# Patient Record
Sex: MALE | Race: BLACK OR AFRICAN AMERICAN | Employment: OTHER | URBAN - METROPOLITAN AREA
[De-identification: names, ages, dates, MRNs, and addresses within clinical notes are randomized per-mention and may not be internally consistent; named-entity substitution may affect disease eponyms.]

---

## 2020-06-08 ENCOUNTER — OFFICE VISIT (OUTPATIENT)
Dept: INTERNAL MEDICINE CLINIC | Age: 75
End: 2020-06-08

## 2020-06-08 VITALS
HEART RATE: 63 BPM | TEMPERATURE: 98.1 F | DIASTOLIC BLOOD PRESSURE: 74 MMHG | OXYGEN SATURATION: 99 % | SYSTOLIC BLOOD PRESSURE: 171 MMHG | WEIGHT: 204.8 LBS | BODY MASS INDEX: 32.15 KG/M2 | HEIGHT: 67 IN | RESPIRATION RATE: 14 BRPM

## 2020-06-08 DIAGNOSIS — G56.03 BILATERAL CARPAL TUNNEL SYNDROME: ICD-10-CM

## 2020-06-08 DIAGNOSIS — I10 ESSENTIAL HYPERTENSION: ICD-10-CM

## 2020-06-08 DIAGNOSIS — I73.9 PVD (PERIPHERAL VASCULAR DISEASE) (HCC): ICD-10-CM

## 2020-06-08 DIAGNOSIS — M79.89 LEG SWELLING: ICD-10-CM

## 2020-06-08 DIAGNOSIS — N18.30 TYPE 2 DIABETES MELLITUS WITH STAGE 3 CHRONIC KIDNEY DISEASE, WITH LONG-TERM CURRENT USE OF INSULIN (HCC): Primary | ICD-10-CM

## 2020-06-08 DIAGNOSIS — E11.22 TYPE 2 DIABETES MELLITUS WITH STAGE 3 CHRONIC KIDNEY DISEASE, WITH LONG-TERM CURRENT USE OF INSULIN (HCC): Primary | ICD-10-CM

## 2020-06-08 DIAGNOSIS — Z79.4 TYPE 2 DIABETES MELLITUS WITH STAGE 3 CHRONIC KIDNEY DISEASE, WITH LONG-TERM CURRENT USE OF INSULIN (HCC): Primary | ICD-10-CM

## 2020-06-08 DIAGNOSIS — E78.00 HIGH CHOLESTEROL: ICD-10-CM

## 2020-06-08 RX ORDER — ALBUTEROL SULFATE 90 UG/1
AEROSOL, METERED RESPIRATORY (INHALATION)
COMMUNITY
End: 2020-07-20 | Stop reason: SDUPTHER

## 2020-06-08 RX ORDER — LANSOPRAZOLE 30 MG/1
30 CAPSULE, DELAYED RELEASE ORAL
Qty: 90 CAP | Refills: 3 | Status: SHIPPED | OUTPATIENT
Start: 2020-06-08 | End: 2021-02-17 | Stop reason: SDUPTHER

## 2020-06-08 RX ORDER — HYDROCHLOROTHIAZIDE 25 MG/1
25 TABLET ORAL DAILY
Qty: 90 TAB | Refills: 3 | Status: SHIPPED | OUTPATIENT
Start: 2020-06-08 | End: 2020-06-08 | Stop reason: CLARIF

## 2020-06-08 RX ORDER — AMLODIPINE BESYLATE 10 MG/1
10 TABLET ORAL DAILY
Qty: 90 TAB | Refills: 1 | COMMUNITY
Start: 2020-06-08 | End: 2020-10-13 | Stop reason: SINTOL

## 2020-06-08 RX ORDER — ATORVASTATIN CALCIUM 20 MG/1
20 TABLET, FILM COATED ORAL DAILY
Qty: 90 TAB | Refills: 3 | Status: SHIPPED | OUTPATIENT
Start: 2020-06-08 | End: 2020-10-13 | Stop reason: DRUGHIGH

## 2020-06-08 RX ORDER — FUROSEMIDE 20 MG/1
20 TABLET ORAL DAILY
Qty: 90 TAB | Refills: 3 | Status: SHIPPED | OUTPATIENT
Start: 2020-06-08 | End: 2020-07-08

## 2020-06-08 RX ORDER — CARVEDILOL 25 MG/1
25 TABLET ORAL 2 TIMES DAILY
Qty: 180 TAB | Refills: 3 | Status: SHIPPED | OUTPATIENT
Start: 2020-06-08 | End: 2021-04-22 | Stop reason: SDUPTHER

## 2020-06-08 RX ORDER — LISINOPRIL 40 MG/1
40 TABLET ORAL DAILY
COMMUNITY
End: 2020-11-04 | Stop reason: SDUPTHER

## 2020-06-08 RX ORDER — INSULIN LISPRO 100 [IU]/ML
INJECTION, SOLUTION INTRAVENOUS; SUBCUTANEOUS
COMMUNITY
Start: 2020-06-08 | End: 2022-01-27

## 2020-06-08 NOTE — PROGRESS NOTES
Seda Zavaleta is a 76 y.o.  male and presents with Hypertension; Diabetes; Leg Swelling; Numbness; and Cholesterol Problem      SUBJECTIVE:    Dyslipidemia  Patient presents for evaluation of lipids. Compliance with treatment thus far has been excellent. A repeat fasting lipid profile was not done. The patient does not use medications that may worsen dyslipidemias (corticosteroids, progestins, anabolic steroids, diuretics, beta-blockers, amiodarone, cyclosporine, olanzapine). The patient exercises intermittently. The patient is not known to have coexisting coronary artery disease. He does have h/o CVA in the past and left occluded carotid for which he tells me he had a procedure done at [facility] 4/2015. Hypertension  Patient is here for evaluation of hypertension. Age at onset of elevated blood pressure:  60.  He indicates that he is feeling well and denies any symptoms referable to his elevated blood pressure. Specifically denies chest pain, palpitations, dyspnea, orthopnea, PND. He does  have peripheral edema. Current medication regimen is as listed   below. Patient has these side effects of medication: none. Cardiovascular risk factors: dyslipidemia, diabetes mellitus, obesity Use of agents associated with hypertension: none History of renal disease: positive. Pt has CKD stage 3 at least since 2016. He was seen Renal in [place] where he just returned from. History of flank trauma: no. Pt's HTN has not been controlled on COreg 25 mg BID and Norvasc 10 mg and Lisinopril 40 mg     Diabetes Mellitus  Patient presents for evaluation of for follow up on diabetes. Onset of symptoms was problem is longstanding, this episode began 20 years ago. He describes symptoms as fatigue. Course to date has been stable. Patient denies polyuria and polydipsia. Home sugars are running: patient forgot to bring HBG readings Previous visits for this problem: none. Evaluation to date has been none.  Treatment goals: not assessed Treatment to date has been Has been on current regimen for several months. . Pt currently seen by Dr Shakira Early and is on Lantus 50 units QHS and Humalog 15 units BID with meals. Pt c/o numbness in both hands worst in AM which has been going on for several months. Pt has anemia due to chronic kidney disease. Respiratory ROS: negative for - shortness of breath  Cardiovascular ROS: negative for - chest pain    Current Outpatient Medications   Medication Sig    lisinopriL (PRINIVIL, ZESTRIL) 40 mg tablet Take 40 mg by mouth daily.  albuterol (ProAir HFA) 90 mcg/actuation inhaler Take  by inhalation.  lansoprazole (Prevacid) 30 mg capsule Take 1 Cap by mouth Daily (before breakfast).  amLODIPine (NORVASC) 10 mg tablet Take 1 Tab by mouth daily.  carvediloL (Coreg) 25 mg tablet Take 1 Tab by mouth two (2) times a day.  atorvastatin (Lipitor) 20 mg tablet Take 1 Tab by mouth daily.  furosemide (LASIX) 20 mg tablet Take 1 Tab by mouth daily. Indications: high blood pressure    insulin lispro (HUMALOG) 100 unit/mL kwikpen 15 units BID with meals    glucose blood VI test strips (ONETOUCH ULTRA TEST) strip 50 Each.  insulin glargine (LANTUS SOLOSTAR) 100 unit/mL (3 mL) pen Lantus 24 Units SC daily    glucose blood VI test strips (ASCENSIA CONTOUR) strip     Insulin Needles, Disposable, (AUBREY PEN NEEDLE) 32 x 5/32 \" ndle 1 each.  Lancets misc 1 each.  multivitamins-minerals-lutein (CENTRUM SILVER) Tab Take  by mouth.  aspirin delayed-release 325 mg tablet 325 mg.    sildenafil citrate (VIAGRA) 100 mg tablet Take 1 Tab by mouth as needed. No current facility-administered medications for this visit.           OBJECTIVE:  alert, well appearing, and in no distress  Visit Vitals  /74   Pulse 63   Temp 98.1 °F (36.7 °C) (Temporal)   Resp 14   Ht 5' 7\" (1.702 m)   Wt 204 lb 12.8 oz (92.9 kg)   SpO2 99%   BMI 32.08 kg/m²      well developed and well nourished  Mental status - normal mood, behavior, speech, dress, motor activity, and thought processes  Eyes - pupils equal and reactive, extraocular eye movements intact  Neck - supple, no significant adenopathy  Chest - clear to auscultation, no wheezes, rales or rhonchi, symmetric air entry  Heart - normal rate and regular rhythm, systolic murmur 2/6 at 2nd left intercostal space  Abdomen - soft, nontender, nondistended, no masses or organomegaly  Neurological - alert, oriented, normal speech, positive Phalen and positive Tinel sign bilaterally at both wrists  Musculoskeletal - no joint tenderness, deformity or swelling  Extremities - pedal edema 2 +  Skin - normal coloration and turgor, no rashes, no suspicious skin lesions noted        Labs: will get copy From Dr Mariana Aleman office     Discussed the patient's BMI with him. The BMI follow up plan is as follows: I have counseled this patient on diet and exercise regimens. Assessment/Plan      ICD-10-CM ICD-9-CM    1. Type 2 diabetes mellitus with stage 3 chronic kidney disease, with long-term current use of insulin ([facility]) E11.22 250.40 REFERRAL TO OPHTHALMOLOGY. Status unknown. Pt followed by Rony and is on Lantus 50 units QHS and Humalog 15-20 units BID with meals      N18.3 585.3     Z79.4 V58.67    2. Essential hypertension I10 401.9 Uncontrolled. Will add furosemide (LASIX) 20 mg tablet daily    3. High cholesterol E78.00 272.0 Status unknown on Lipitor 20 mg    4. Bilateral carpal tunnel syndrome G56.03 354.0 Pt to use wrist splints at night    5. Leg swelling M79.89 729.81 Will try furosemide (LASIX) 20 mg tablet. If not improving will decrease Norvasc    6. PVD (peripheral vascular disease) ([facility]) I73.9 443.9 Pt is on  mg and statin. Consider repeat PVL of carotids. Follow-up and Dispositions    · Return in about 4 weeks (around 7/6/2020) for BP check. Reviewed plan of care. Patient has provided input and agrees with goals.

## 2020-06-18 ENCOUNTER — TELEPHONE (OUTPATIENT)
Dept: INTERNAL MEDICINE CLINIC | Age: 75
End: 2020-06-18

## 2020-06-18 NOTE — TELEPHONE ENCOUNTER
Pt c/o heart beating very fast after taking the Furosemide that was prescribed---said he has stopped it and won't take anymore of it. Please advise him what he should do at [phone].

## 2020-06-19 NOTE — TELEPHONE ENCOUNTER
Pt had palpitations with Lasix and SBP went to 80 so will hold off on adding more BP meds and continue on lisinopril , coreg and Norvasc /65 today. Pt sudhir continue to monitor daily and bring in record at next OV. He never used HCTZ. Jude

## 2020-07-08 ENCOUNTER — VIRTUAL VISIT (OUTPATIENT)
Dept: INTERNAL MEDICINE CLINIC | Age: 75
End: 2020-07-08

## 2020-07-08 DIAGNOSIS — Z12.5 PROSTATE CANCER SCREENING: ICD-10-CM

## 2020-07-08 DIAGNOSIS — E11.22 TYPE 2 DIABETES MELLITUS WITH STAGE 3 CHRONIC KIDNEY DISEASE, WITH LONG-TERM CURRENT USE OF INSULIN (HCC): ICD-10-CM

## 2020-07-08 DIAGNOSIS — E78.00 HIGH CHOLESTEROL: ICD-10-CM

## 2020-07-08 DIAGNOSIS — I10 ESSENTIAL HYPERTENSION: Primary | ICD-10-CM

## 2020-07-08 DIAGNOSIS — N18.30 STAGE 3 CHRONIC KIDNEY DISEASE (HCC): ICD-10-CM

## 2020-07-08 DIAGNOSIS — N18.30 TYPE 2 DIABETES MELLITUS WITH STAGE 3 CHRONIC KIDNEY DISEASE, WITH LONG-TERM CURRENT USE OF INSULIN (HCC): ICD-10-CM

## 2020-07-08 DIAGNOSIS — Z79.4 TYPE 2 DIABETES MELLITUS WITH STAGE 3 CHRONIC KIDNEY DISEASE, WITH LONG-TERM CURRENT USE OF INSULIN (HCC): ICD-10-CM

## 2020-07-08 DIAGNOSIS — Z11.59 NEED FOR HEPATITIS C SCREENING TEST: ICD-10-CM

## 2020-07-08 NOTE — PROGRESS NOTES
Santo Trammell 76 y.o. male who was seen by synchronous (real-time) audio-video technology on 07/08/20    Consent:  heand/or his healthcare decision maker is aware that this patient-initiated Telehealth encounter is a billable service, with coverage as determined by her insurance carrier. he is aware that he may receive a bill and has provided verbal consent to proceed: Yes I was in my office while conducting this encounter. The patient was in their home. Santo Trammell is a 76 y.o.  male and presents with Hypertension; Chronic Kidney Disease; Diabetes; and Cholesterol Problem      SUBJECTIVE:    Patient's blood pressure is better controlled on lisinopril 40 mg daily along with Norvasc 10 mg daily and Coreg 25 mg twice daily. Patient did not tolerate taking Lasix and because he has chronic kidney disease stage III did not give him HCTZ. He will continue to work on diet exercise and weight loss to keep his blood pressure under control. He continues on Lipitor 20 mg daily for his high cholesterol. His diabetes has been controlled on Lantus 24 units daily with Humalog 15 units twice daily    Respiratory ROS: negative for - shortness of breath  Cardiovascular ROS: negative for - chest pain    Current Outpatient Medications   Medication Sig    lisinopriL (PRINIVIL, ZESTRIL) 40 mg tablet Take 40 mg by mouth daily.  albuterol (ProAir HFA) 90 mcg/actuation inhaler Take  by inhalation.  lansoprazole (Prevacid) 30 mg capsule Take 1 Cap by mouth Daily (before breakfast).  amLODIPine (NORVASC) 10 mg tablet Take 1 Tab by mouth daily.  carvediloL (Coreg) 25 mg tablet Take 1 Tab by mouth two (2) times a day.  atorvastatin (Lipitor) 20 mg tablet Take 1 Tab by mouth daily.  insulin lispro (HUMALOG) 100 unit/mL kwikpen 15 units BID with meals    aspirin delayed-release 325 mg tablet 325 mg.    glucose blood VI test strips (ONETOUCH ULTRA TEST) strip 50 Each.     insulin glargine (LANTUS SOLOSTAR) 100 unit/mL (3 mL) pen Lantus 24 Units SC daily    glucose blood VI test strips (ASCENSIA CONTOUR) strip     Insulin Needles, Disposable, (AUBREY PEN NEEDLE) 32 x 5/32 \" ndle 1 each.  Lancets misc 1 each.  multivitamins-minerals-lutein (CENTRUM SILVER) Tab Take  by mouth.  sildenafil citrate (VIAGRA) 100 mg tablet Take 1 Tab by mouth as needed. No current facility-administered medications for this visit. OBJECTIVE:  alert, well appearing, and in no distress  Patient-Reported Vitals 7/8/2020   Patient-Reported Pulse 69   Patient-Reported Systolic  360   Patient-Reported Diastolic 64         well developed and well nourished      Discussed the patient's BMI with him. The BMI follow up plan is as follows: I have counseled this patient on diet and exercise regimens. Assessment/Plan      ICD-10-CM ICD-9-CM    1. Essential hypertension  I10 401.9 Controlled on Lisinopril, norvasc and coreg. METABOLIC PANEL, COMPREHENSIVE   2. Stage 3 chronic kidney disease (HCC)  N18.3 585. 3 currently stable. Pt was seeing Renal when he was out off town. 3. Type 2 diabetes mellitus with stage 3 chronic kidney disease, with long-term current use of insulin (HCC)  E11.22 250.40 Appears controlled on current insulin. HEMOGLOBIN A1C W/O EAG    N18.3 585.3 MICROALBUMIN, UR, RAND W/ MICROALB/CREAT RATIO    Z79.4 V58.67    4. High cholesterol  E78.00 272.0 Pt continues on Lipitor 20 mg LIPID PANEL   5. Need for hepatitis C screening test  Z11.59 V73.89 HEPATITIS C AB   6. Prostate cancer screening  Z12.5 V76.44 PSA SCREENING (SCREENING)     Follow-up and Dispositions    · Return in about 3 months (around 10/8/2020) for OV, and Medicare Wellness Visit, labs 1 week before. Reviewed plan of care. Patient has provided input and agrees with goals.

## 2020-07-16 PROBLEM — N18.30 TYPE 2 DM WITH CKD STAGE 3 AND HYPERTENSION (HCC): Status: ACTIVE | Noted: 2017-05-05

## 2020-07-16 PROBLEM — N18.30 CKD (CHRONIC KIDNEY DISEASE) STAGE 3, GFR 30-59 ML/MIN (HCC): Status: ACTIVE | Noted: 2017-05-05

## 2020-07-16 PROBLEM — E11.42 DIABETIC POLYNEUROPATHY ASSOCIATED WITH TYPE 2 DIABETES MELLITUS (HCC): Status: ACTIVE | Noted: 2017-05-05

## 2020-07-16 PROBLEM — M16.12 PRIMARY OSTEOARTHRITIS OF LEFT HIP: Status: ACTIVE | Noted: 2017-05-05

## 2020-07-16 PROBLEM — I67.9 CEREBRAL VASCULAR DISEASE: Status: ACTIVE | Noted: 2017-05-05

## 2020-07-16 PROBLEM — I12.9 TYPE 2 DM WITH CKD STAGE 3 AND HYPERTENSION (HCC): Status: ACTIVE | Noted: 2017-05-05

## 2020-07-16 PROBLEM — I65.23 BILATERAL CAROTID ARTERY STENOSIS: Status: ACTIVE | Noted: 2017-05-05

## 2020-07-16 PROBLEM — E11.22 TYPE 2 DM WITH CKD STAGE 3 AND HYPERTENSION (HCC): Status: ACTIVE | Noted: 2017-05-05

## 2020-07-20 RX ORDER — ALBUTEROL SULFATE 90 UG/1
2 AEROSOL, METERED RESPIRATORY (INHALATION)
Qty: 3 INHALER | Refills: 3 | Status: SHIPPED | OUTPATIENT
Start: 2020-07-20 | End: 2021-01-04 | Stop reason: CLARIF

## 2020-07-20 NOTE — TELEPHONE ENCOUNTER
Please input desired frequency. Last Visit: 07/08/2020 with MD Abdi  Next Appointment: 10/13/2020 with MD Abdi    Requested Prescriptions     Pending Prescriptions Disp Refills    albuterol (ProAir HFA) 90 mcg/actuation inhaler 1 Inhaler      Sig: Take  by inhalation.

## 2020-10-06 ENCOUNTER — APPOINTMENT (OUTPATIENT)
Dept: INTERNAL MEDICINE CLINIC | Age: 75
End: 2020-10-06

## 2020-10-06 ENCOUNTER — HOSPITAL ENCOUNTER (OUTPATIENT)
Dept: LAB | Age: 75
Discharge: HOME | End: 2020-10-06
Payer: COMMERCIAL

## 2020-10-06 DIAGNOSIS — Z79.4 TYPE 2 DIABETES MELLITUS WITH STAGE 3 CHRONIC KIDNEY DISEASE, WITH LONG-TERM CURRENT USE OF INSULIN (HCC): ICD-10-CM

## 2020-10-06 DIAGNOSIS — E11.22 TYPE 2 DIABETES MELLITUS WITH STAGE 3 CHRONIC KIDNEY DISEASE, WITH LONG-TERM CURRENT USE OF INSULIN (HCC): ICD-10-CM

## 2020-10-06 DIAGNOSIS — Z11.59 NEED FOR HEPATITIS C SCREENING TEST: ICD-10-CM

## 2020-10-06 DIAGNOSIS — E78.00 HIGH CHOLESTEROL: ICD-10-CM

## 2020-10-06 DIAGNOSIS — N18.30 TYPE 2 DIABETES MELLITUS WITH STAGE 3 CHRONIC KIDNEY DISEASE, WITH LONG-TERM CURRENT USE OF INSULIN (HCC): ICD-10-CM

## 2020-10-06 DIAGNOSIS — Z12.5 PROSTATE CANCER SCREENING: ICD-10-CM

## 2020-10-06 DIAGNOSIS — I10 ESSENTIAL HYPERTENSION: ICD-10-CM

## 2020-10-06 LAB
ALBUMIN SERPL-MCNC: 3.5 G/DL (ref 3.4–5)
ALBUMIN/GLOB SERPL: 1.2 {RATIO} (ref 0.8–1.7)
ALP SERPL-CCNC: 71 U/L (ref 45–117)
ALT SERPL-CCNC: 28 U/L (ref 16–61)
ANION GAP SERPL CALC-SCNC: 6 MMOL/L (ref 3–18)
AST SERPL-CCNC: 19 U/L (ref 10–38)
BILIRUB SERPL-MCNC: 0.3 MG/DL (ref 0.2–1)
BUN SERPL-MCNC: 22 MG/DL (ref 7–18)
BUN/CREAT SERPL: 15 (ref 12–20)
CALCIUM SERPL-MCNC: 9.1 MG/DL (ref 8.5–10.1)
CHLORIDE SERPL-SCNC: 109 MMOL/L (ref 100–111)
CHOLEST SERPL-MCNC: 183 MG/DL
CO2 SERPL-SCNC: 24 MMOL/L (ref 21–32)
CREAT SERPL-MCNC: 1.46 MG/DL (ref 0.6–1.3)
CREAT UR-MCNC: 78 MG/DL (ref 30–125)
GLOBULIN SER CALC-MCNC: 3 G/DL (ref 2–4)
GLUCOSE SERPL-MCNC: 148 MG/DL (ref 74–99)
HBA1C MFR BLD: 7.8 % (ref 4.2–5.6)
HDLC SERPL-MCNC: 54 MG/DL (ref 40–60)
HDLC SERPL: 3.4 {RATIO} (ref 0–5)
LDLC SERPL CALC-MCNC: 100.6 MG/DL (ref 0–100)
LIPID PROFILE,FLP: ABNORMAL
MICROALBUMIN UR-MCNC: 15.9 MG/DL (ref 0–3)
MICROALBUMIN/CREAT UR-RTO: 204 MG/G (ref 0–30)
POTASSIUM SERPL-SCNC: 4.8 MMOL/L (ref 3.5–5.5)
PROT SERPL-MCNC: 6.5 G/DL (ref 6.4–8.2)
PSA SERPL-MCNC: 0.8 NG/ML (ref 0–4)
SODIUM SERPL-SCNC: 139 MMOL/L (ref 136–145)
TRIGL SERPL-MCNC: 142 MG/DL (ref ?–150)
VLDLC SERPL CALC-MCNC: 28.4 MG/DL

## 2020-10-06 PROCEDURE — 82043 UR ALBUMIN QUANTITATIVE: CPT

## 2020-10-06 PROCEDURE — 86803 HEPATITIS C AB TEST: CPT

## 2020-10-06 PROCEDURE — 83036 HEMOGLOBIN GLYCOSYLATED A1C: CPT

## 2020-10-06 PROCEDURE — 36415 COLL VENOUS BLD VENIPUNCTURE: CPT

## 2020-10-06 PROCEDURE — 84153 ASSAY OF PSA TOTAL: CPT

## 2020-10-06 PROCEDURE — 80053 COMPREHEN METABOLIC PANEL: CPT

## 2020-10-06 PROCEDURE — 80061 LIPID PANEL: CPT

## 2020-10-07 LAB
HCV AB SER IA-ACNC: <0.02 INDEX
HCV AB SERPL QL IA: NEGATIVE
HCV COMMENT,HCGAC: NORMAL

## 2020-10-13 ENCOUNTER — OFFICE VISIT (OUTPATIENT)
Dept: INTERNAL MEDICINE CLINIC | Age: 75
End: 2020-10-13
Payer: COMMERCIAL

## 2020-10-13 VITALS
BODY MASS INDEX: 32.18 KG/M2 | DIASTOLIC BLOOD PRESSURE: 64 MMHG | RESPIRATION RATE: 20 BRPM | HEIGHT: 67 IN | TEMPERATURE: 97.7 F | OXYGEN SATURATION: 99 % | WEIGHT: 205 LBS | HEART RATE: 68 BPM | SYSTOLIC BLOOD PRESSURE: 152 MMHG

## 2020-10-13 DIAGNOSIS — Z79.4 TYPE 2 DIABETES MELLITUS WITH STAGE 3A CHRONIC KIDNEY DISEASE, WITH LONG-TERM CURRENT USE OF INSULIN (HCC): Primary | ICD-10-CM

## 2020-10-13 DIAGNOSIS — E78.00 HIGH CHOLESTEROL: ICD-10-CM

## 2020-10-13 DIAGNOSIS — E11.22 TYPE 2 DIABETES MELLITUS WITH STAGE 3A CHRONIC KIDNEY DISEASE, WITH LONG-TERM CURRENT USE OF INSULIN (HCC): Primary | ICD-10-CM

## 2020-10-13 DIAGNOSIS — N18.31 TYPE 2 DIABETES MELLITUS WITH STAGE 3A CHRONIC KIDNEY DISEASE, WITH LONG-TERM CURRENT USE OF INSULIN (HCC): Primary | ICD-10-CM

## 2020-10-13 DIAGNOSIS — J61 ASBESTOSIS (HCC): ICD-10-CM

## 2020-10-13 DIAGNOSIS — I10 ESSENTIAL HYPERTENSION: ICD-10-CM

## 2020-10-13 PROCEDURE — 99214 OFFICE O/P EST MOD 30 MIN: CPT | Performed by: INTERNAL MEDICINE

## 2020-10-13 PROCEDURE — 3051F HG A1C>EQUAL 7.0%<8.0%: CPT | Performed by: INTERNAL MEDICINE

## 2020-10-13 RX ORDER — ATORVASTATIN CALCIUM 40 MG/1
40 TABLET, FILM COATED ORAL DAILY
Qty: 90 TAB | Refills: 3 | Status: SHIPPED | OUTPATIENT
Start: 2020-10-13 | End: 2021-08-24

## 2020-10-13 RX ORDER — HYDRALAZINE HYDROCHLORIDE 50 MG/1
50 TABLET, FILM COATED ORAL 3 TIMES DAILY
Qty: 270 TAB | Refills: 3 | Status: SHIPPED | OUTPATIENT
Start: 2020-10-13 | End: 2020-10-20 | Stop reason: SINTOL

## 2020-10-13 NOTE — PROGRESS NOTES
Subjective:       Chief Complaint  The patient presents for follow up of diabetes, hypertension and high cholesterol. JOAO Quinteros is a 76 y.o. male seen for follow up of diabetes. Rosa has hypertension and hyperlipidemia. Diabetes borderline controlled, on Lantus insulin 40 units daily and Humalog 15 units BID with meals, hypertension poorly controlled, in the office on lisinopril 40 mg and coreg 25 mg BD, norvasc 10 mg, pt did not tolerate diuretic and he says SBP is 120-130 at home , he had leg swelling on Norvasc so will stop it and replace with hydralazine,  hyperlipidemia no significant medication side effects noted, borderline controlled, on Lipitor 40 mg which he has not taken consistently. He has h/o CVA so he was encouraged to take it on a regular basis. Diet and Lifestyle: generally follows a low fat low cholesterol diet, follows a diabetic diet regularly, exercises sporadically    Home BP Monitoring: is well controlled at home,    Diabetic Review of Systems - home glucose monitoring: is performed regularly, 3x/day. Other symptoms and concerns: Patient has a history of asbestosis exposure. Consider chest x-ray follow-up in the near future. Discussed the patient's BMI with him. The BMI follow up plan is as follows: I have counseled this patient on diet and exercise regimens. Patient [place] in the [place].  It has been over 5 years and he needs to consider another for completeness. Patient is also having difficulty with swallowing at times and would benefit from also have an EGD. He wants to hold off until next office visit due to COVID-19 pandemic    Current Outpatient Medications   Medication Sig    atorvastatin (LIPITOR) 40 mg tablet Take 1 Tab by mouth daily.  lisinopriL (PRINIVIL, ZESTRIL) 40 mg tablet Take 40 mg by mouth daily.  lansoprazole (Prevacid) 30 mg capsule Take 1 Cap by mouth Daily (before breakfast).     carvediloL (Coreg) 25 mg tablet Take 1 Tab by mouth two (2) times a day.  insulin lispro (HUMALOG) 100 unit/mL kwikpen 15 units BID with meals    aspirin delayed-release 325 mg tablet 325 mg.    glucose blood VI test strips (ONETOUCH ULTRA TEST) strip 50 Each.  insulin glargine (LANTUS SOLOSTAR) 100 unit/mL (3 mL) pen Lantus 24 Units SC daily    glucose blood VI test strips (ASCENSIA CONTOUR) strip     Insulin Needles, Disposable, (AUBREY PEN NEEDLE) 32 x 5/32 \" ndle 1 each.  Lancets misc 1 each.  multivitamins-minerals-lutein (CENTRUM SILVER) Tab Take  by mouth.  albuterol (ProAir HFA) 90 mcg/actuation inhaler Take 2 Puffs by inhalation every four (4) hours as needed for Respiratory Distress.  sildenafil citrate (VIAGRA) 100 mg tablet Take 1 Tab by mouth as needed. No current facility-administered medications for this visit.               Review of Systems  Respiratory: negative for dyspnea on exertion  Cardiovascular: negative for chest pain    Objective:     Visit Vitals  BP (!) 152/64 (BP 1 Location: Left arm, BP Patient Position: Sitting)   Pulse 68   Temp 97.7 °F (36.5 °C) (Temporal)   Resp 20   Ht 5' 7\" (1.702 m)   Wt 205 lb (93 kg)   SpO2 99%   BMI 32.11 kg/m²        Chest - clear to auscultation, no wheezes, rales or rhonchi, symmetric air entry  Heart - normal rate, regular rhythm, normal S1, S2, no murmurs, rubs, clicks or gallops  Extremities - pedal edema 2 +      Labs:   Lab Results   Component Value Date/Time    Cholesterol, total 183 10/06/2020 09:10 AM    HDL Cholesterol 54 10/06/2020 09:10 AM    LDL, calculated 100.6 (H) 10/06/2020 09:10 AM    Triglyceride 142 10/06/2020 09:10 AM    CHOL/HDL Ratio 3.4 10/06/2020 09:10 AM     Lab Results   Component Value Date/Time    Prostate Specific Ag 0.8 10/06/2020 09:10 AM    Prostate Specific Ag 0.8 07/06/2016 09:45 AM    Prostate Specific Ag 1.4 12/30/2015 10:30 AM    Prostate Specific Ag 1.2 06/04/2015 11:14 AM    Prostate Specific Ag 2.4 12/16/2014 11:37 AM    Prostate Specific Ag 0.9 11/25/2014 11:51 AM     Lab Results   Component Value Date/Time    Sodium 139 10/06/2020 09:10 AM    Potassium 4.8 10/06/2020 09:10 AM    Chloride 109 10/06/2020 09:10 AM    CO2 24 10/06/2020 09:10 AM    Anion gap 6 10/06/2020 09:10 AM    Glucose 148 (H) 10/06/2020 09:10 AM    BUN 22 (H) 10/06/2020 09:10 AM    Creatinine 1.46 (H) 10/06/2020 09:10 AM    BUN/Creatinine ratio 15 10/06/2020 09:10 AM    GFR est AA 57 (L) 10/06/2020 09:10 AM    GFR est non-AA 47 (L) 10/06/2020 09:10 AM    Calcium 9.1 10/06/2020 09:10 AM    Bilirubin, total 0.3 10/06/2020 09:10 AM    ALT (SGPT) 28 10/06/2020 09:10 AM    Alk. phosphatase 71 10/06/2020 09:10 AM    Protein, total 6.5 10/06/2020 09:10 AM    Albumin 3.5 10/06/2020 09:10 AM    Globulin 3.0 10/06/2020 09:10 AM    A-G Ratio 1.2 10/06/2020 09:10 AM      Lab Results   Component Value Date/Time    Hemoglobin A1c 7.8 (H) 10/06/2020 09:10 AM            Assessment / Plan     Diabetes borderline controlled, on Lantus 40 units daily and Humalog 15 units 3 times daily with meals. Patient will try improve diet  Hypertension controlled at home. We will stop Norvasc due to leg swelling and start hydralazine and continue Coreg and lisinopril. Hyperlipidemia borderline controlled, due to history of CVA patient is to take Lipitor 40 mg on a daily basis. ICD-10-CM ICD-9-CM    1. Type 2 diabetes mellitus with stage 3a chronic kidney disease, with long-term current use of insulin ([facility])  E11.21 250.40 HEMOGLOBIN A1C W/O EAG    N18.31 585. 3     Z79.4 V58.67    2. Essential hypertension  K64 432.5 METABOLIC PANEL, COMPREHENSIVE   3. High cholesterol  E78.00 272.0 LIPID PANEL   4. Asbestosis [facility])  [place] [address]  consider follow-up CT scan of the chest x-ray in the near future. Diabetic issues reviewed with him: diabetic diet discussed in detail, and low cholesterol diet, weight control and daily exercise discussed.      Follow-up and Dispositions · Return in about 3 months (around 1/13/2021) for labs 1 week before. Reviewed plan of care. Patient has provided input and agrees with goals.

## 2020-10-13 NOTE — PATIENT INSTRUCTIONS
High Blood Pressure: Care Instructions Overview It's normal for blood pressure to go up and down throughout the day. But if it stays up, you have high blood pressure. Another name for high blood pressure is hypertension. Despite what a lot of people think, high blood pressure usually doesn't cause headaches or make you feel dizzy or lightheaded. It usually has no symptoms. But it does increase your risk of stroke, heart attack, and other problems. You and your doctor will talk about your risks of these problems based on your blood pressure. Your doctor will give you a goal for your blood pressure. Your goal will be based on your health and your age. Lifestyle changes, such as eating healthy and being active, are always important to help lower blood pressure. You might also take medicine to reach your blood pressure goal. 
Follow-up care is a key part of your treatment and safety. Be sure to make and go to all appointments, and call your doctor if you are having problems. It's also a good idea to know your test results and keep a list of the medicines you take. How can you care for yourself at home? Medical treatment · If you stop taking your medicine, your blood pressure will go back up. You may take one or more types of medicine to lower your blood pressure. Be safe with medicines. Take your medicine exactly as prescribed. Call your doctor if you think you are having a problem with your medicine. · Talk to your doctor before you start taking aspirin every day. Aspirin can help certain people lower their risk of a heart attack or stroke. But taking aspirin isn't right for everyone, because it can cause serious bleeding. · See your doctor regularly. You may need to see the doctor more often at first or until your blood pressure comes down. · If you are taking blood pressure medicine, talk to your doctor before you take decongestants or anti-inflammatory medicine, such as ibuprofen. Some of these medicines can raise blood pressure. · Learn how to check your blood pressure at home. Lifestyle changes · Stay at a healthy weight. This is especially important if you put on weight around the waist. Losing even 10 pounds can help you lower your blood pressure. · If your doctor recommends it, get more exercise. Walking is a good choice. Bit by bit, increase the amount you walk every day. Try for at least 30 minutes on most days of the week. You also may want to swim, bike, or do other activities. · Avoid or limit alcohol. Talk to your doctor about whether you can drink any alcohol. · Try to limit how much sodium you eat to less than 2,300 milligrams (mg) a day. Your doctor may ask you to try to eat less than 1,500 mg a day. · Eat plenty of fruits (such as bananas and oranges), vegetables, legumes, whole grains, and low-fat dairy products. · Lower the amount of saturated fat in your diet. Saturated fat is found in animal products such as milk, cheese, and meat. Limiting these foods may help you lose weight and also lower your risk for heart disease. · Do not smoke. Smoking increases your risk for heart attack and stroke. If you need help quitting, talk to your doctor about stop-smoking programs and medicines. These can increase your chances of quitting for good. When should you call for help? Call  911 anytime you think you may need emergency care. This may mean having symptoms that suggest that your blood pressure is causing a serious heart or blood vessel problem. Your blood pressure may be over 180/120. For example, call 911 if: 
  · You have symptoms of a heart attack. These may include: 
? Chest pain or pressure, or a strange feeling in the chest. 
? Sweating. ? Shortness of breath. ? Nausea or vomiting. ? Pain, pressure, or a strange feeling in the back, neck, jaw, or upper belly or in one or both shoulders or arms. ? Lightheadedness or sudden weakness. ? A fast or irregular heartbeat.  
  · You have symptoms of a stroke. These may include: 
? Sudden numbness, tingling, weakness, or loss of movement in your face, arm, or leg, especially on only one side of your body. ? Sudden vision changes. ? Sudden trouble speaking. ? Sudden confusion or trouble understanding simple statements. ? Sudden problems with walking or balance. ? A sudden, severe headache that is different from past headaches.  
  · You have severe back or belly pain. Do not wait until your blood pressure comes down on its own. Get help right away. Call your doctor now or seek immediate care if: 
  · Your blood pressure is much higher than normal (such as 180/120 or higher), but you don't have symptoms.  
  · You think high blood pressure is causing symptoms, such as: 
? Severe headache. 
? Blurry vision. Watch closely for changes in your health, and be sure to contact your doctor if: 
  · Your blood pressure measures higher than your doctor recommends at least 2 times. That means the top number is higher or the bottom number is higher, or both.  
  · You think you may be having side effects from your blood pressure medicine. Where can you learn more? Go to http://www.gray.com/ Enter A253 in the search box to learn more about \"High Blood Pressure: Care Instructions. \" Current as of: December 16, 2019               Content Version: 12.6 © 6337-4764 [employer]. Care instructions adapted under license by [employer] (which disclaims liability or warranty for this information). If you have questions about a medical condition or this instruction, always ask your healthcare professional. [place] [address] any warranty or liability for your use of this information.

## 2020-10-13 NOTE — PROGRESS NOTES
Patient is in the office today for a 3 month follow up. 1. Have you been to the ER, urgent care clinic since your last visit? Hospitalized since your last visit? No    2. Have you seen or consulted any other health care providers outside of the [address] [place] [address] since your last visit? Include any pap smears or colon screening. yes, [facility], and Dr. Ernestina Patel.

## 2020-10-20 ENCOUNTER — TELEPHONE (OUTPATIENT)
Dept: INTERNAL MEDICINE CLINIC | Age: 75
End: 2020-10-20

## 2020-10-20 NOTE — TELEPHONE ENCOUNTER
Patient said he can't take the hydralazine. It makes him sick. Heart racing, fatigue and can't sleep.    Please advise and call

## 2020-10-23 NOTE — TELEPHONE ENCOUNTER
Patient is aware per Dr. Feroz Correa BP is good on lisinopril and coreg. He does not need any further BP medications at this time. Patient verbalizes understanding.

## 2020-11-04 RX ORDER — LISINOPRIL 40 MG/1
40 TABLET ORAL DAILY
Qty: 90 TAB | Refills: 3 | Status: SHIPPED | OUTPATIENT
Start: 2020-11-04 | End: 2021-01-21 | Stop reason: SDUPTHER

## 2020-11-04 NOTE — TELEPHONE ENCOUNTER
This med is listed as historical. Please sign if appropriate. Last Visit: 10/13/20 with MD Abdi  Next Appointment: 1/22/21 with MD Abdi    Requested Prescriptions     Pending Prescriptions Disp Refills    lisinopriL (PRINIVIL, ZESTRIL) 40 mg tablet 90 Tab 1     Sig: Take 1 Tab by mouth daily.

## 2020-12-22 RX ORDER — FELODIPINE 5 MG/1
5 TABLET, EXTENDED RELEASE ORAL DAILY
Qty: 90 TAB | Refills: 3 | Status: SHIPPED | OUTPATIENT
Start: 2020-12-22 | End: 2021-10-27

## 2020-12-22 NOTE — TELEPHONE ENCOUNTER
Where does he want the new prescription to be sent too ?  He will add it the medications he is already taking

## 2021-01-04 ENCOUNTER — TELEPHONE (OUTPATIENT)
Dept: INTERNAL MEDICINE CLINIC | Age: 76
End: 2021-01-04

## 2021-01-04 RX ORDER — ALBUTEROL SULFATE 90 UG/1
2 AEROSOL, METERED RESPIRATORY (INHALATION)
Qty: 3 INHALER | Refills: 3 | Status: SHIPPED | OUTPATIENT
Start: 2021-01-04 | End: 2021-03-22 | Stop reason: SDUPTHER

## 2021-01-04 NOTE — TELEPHONE ENCOUNTER
Pt states the ProAir is being discontinued, he was notified by his insurance company. Please prescribe an alternative and send to pharmacy on file.

## 2021-01-14 ENCOUNTER — APPOINTMENT (OUTPATIENT)
Dept: INTERNAL MEDICINE CLINIC | Age: 76
End: 2021-01-14

## 2021-01-14 ENCOUNTER — HOSPITAL ENCOUNTER (OUTPATIENT)
Dept: LAB | Age: 76
Discharge: HOME | End: 2021-01-14
Payer: COMMERCIAL

## 2021-01-14 DIAGNOSIS — Z79.4 TYPE 2 DIABETES MELLITUS WITH STAGE 3A CHRONIC KIDNEY DISEASE, WITH LONG-TERM CURRENT USE OF INSULIN (HCC): ICD-10-CM

## 2021-01-14 DIAGNOSIS — E11.22 TYPE 2 DIABETES MELLITUS WITH STAGE 3A CHRONIC KIDNEY DISEASE, WITH LONG-TERM CURRENT USE OF INSULIN (HCC): ICD-10-CM

## 2021-01-14 DIAGNOSIS — E78.00 HIGH CHOLESTEROL: ICD-10-CM

## 2021-01-14 DIAGNOSIS — I10 ESSENTIAL HYPERTENSION: ICD-10-CM

## 2021-01-14 DIAGNOSIS — N18.31 TYPE 2 DIABETES MELLITUS WITH STAGE 3A CHRONIC KIDNEY DISEASE, WITH LONG-TERM CURRENT USE OF INSULIN (HCC): ICD-10-CM

## 2021-01-14 LAB
ALBUMIN SERPL-MCNC: 3.6 G/DL (ref 3.4–5)
ALBUMIN/GLOB SERPL: 1.1 {RATIO} (ref 0.8–1.7)
ALP SERPL-CCNC: 69 U/L (ref 45–117)
ALT SERPL-CCNC: 35 U/L (ref 16–61)
ANION GAP SERPL CALC-SCNC: 6 MMOL/L (ref 3–18)
AST SERPL-CCNC: 20 U/L (ref 10–38)
BILIRUB SERPL-MCNC: 0.6 MG/DL (ref 0.2–1)
BUN SERPL-MCNC: 30 MG/DL (ref 7–18)
BUN/CREAT SERPL: 19 (ref 12–20)
CALCIUM SERPL-MCNC: 9 MG/DL (ref 8.5–10.1)
CHLORIDE SERPL-SCNC: 109 MMOL/L (ref 100–111)
CHOLEST SERPL-MCNC: 147 MG/DL
CO2 SERPL-SCNC: 25 MMOL/L (ref 21–32)
CREAT SERPL-MCNC: 1.6 MG/DL (ref 0.6–1.3)
GLOBULIN SER CALC-MCNC: 3.3 G/DL (ref 2–4)
GLUCOSE SERPL-MCNC: 109 MG/DL (ref 74–99)
HBA1C MFR BLD: 7.8 % (ref 4.2–5.6)
HDLC SERPL-MCNC: 58 MG/DL (ref 40–60)
HDLC SERPL: 2.5 {RATIO} (ref 0–5)
LDLC SERPL CALC-MCNC: 70 MG/DL (ref 0–100)
LIPID PROFILE,FLP: NORMAL
POTASSIUM SERPL-SCNC: 4.9 MMOL/L (ref 3.5–5.5)
PROT SERPL-MCNC: 6.9 G/DL (ref 6.4–8.2)
SODIUM SERPL-SCNC: 140 MMOL/L (ref 136–145)
TRIGL SERPL-MCNC: 95 MG/DL (ref ?–150)
VLDLC SERPL CALC-MCNC: 19 MG/DL

## 2021-01-14 PROCEDURE — 36415 COLL VENOUS BLD VENIPUNCTURE: CPT

## 2021-01-14 PROCEDURE — 80061 LIPID PANEL: CPT

## 2021-01-14 PROCEDURE — 83036 HEMOGLOBIN GLYCOSYLATED A1C: CPT

## 2021-01-14 PROCEDURE — 80053 COMPREHEN METABOLIC PANEL: CPT

## 2021-01-21 RX ORDER — LISINOPRIL 40 MG/1
40 TABLET ORAL DAILY
Qty: 30 TAB | Refills: 0 | Status: SHIPPED | OUTPATIENT
Start: 2021-01-21 | End: 2021-08-06 | Stop reason: SDUPTHER

## 2021-01-21 NOTE — TELEPHONE ENCOUNTER
30 d/s pended to local pharmacy to hold patient until mailorder arrives. Requested Prescriptions     Pending Prescriptions Disp Refills    lisinopriL (PRINIVIL, ZESTRIL) 40 mg tablet 30 Tab 0     Sig: Take 1 Tab by mouth daily.

## 2021-01-21 NOTE — TELEPHONE ENCOUNTER
Pt calling asking for a short term supply of Lisinopril until he gets his mail order.  [facility]    Says he should have the mail order by 2/7

## 2021-01-22 ENCOUNTER — OFFICE VISIT (OUTPATIENT)
Dept: INTERNAL MEDICINE CLINIC | Age: 76
End: 2021-01-22
Payer: COMMERCIAL

## 2021-01-22 VITALS
RESPIRATION RATE: 20 BRPM | OXYGEN SATURATION: 99 % | WEIGHT: 208 LBS | DIASTOLIC BLOOD PRESSURE: 71 MMHG | BODY MASS INDEX: 32.65 KG/M2 | HEIGHT: 67 IN | HEART RATE: 65 BPM | TEMPERATURE: 97.5 F | SYSTOLIC BLOOD PRESSURE: 154 MMHG

## 2021-01-22 DIAGNOSIS — E78.2 MIXED HYPERLIPIDEMIA: ICD-10-CM

## 2021-01-22 DIAGNOSIS — Z79.4 TYPE 2 DIABETES MELLITUS WITH STAGE 3A CHRONIC KIDNEY DISEASE, WITH LONG-TERM CURRENT USE OF INSULIN (HCC): Primary | ICD-10-CM

## 2021-01-22 DIAGNOSIS — Z23 ENCOUNTER FOR IMMUNIZATION: ICD-10-CM

## 2021-01-22 DIAGNOSIS — N18.31 TYPE 2 DIABETES MELLITUS WITH STAGE 3A CHRONIC KIDNEY DISEASE, WITH LONG-TERM CURRENT USE OF INSULIN (HCC): Primary | ICD-10-CM

## 2021-01-22 DIAGNOSIS — I10 ESSENTIAL HYPERTENSION: ICD-10-CM

## 2021-01-22 DIAGNOSIS — E11.22 TYPE 2 DIABETES MELLITUS WITH STAGE 3A CHRONIC KIDNEY DISEASE, WITH LONG-TERM CURRENT USE OF INSULIN (HCC): Primary | ICD-10-CM

## 2021-01-22 PROCEDURE — 90471 IMMUNIZATION ADMIN: CPT | Performed by: INTERNAL MEDICINE

## 2021-01-22 PROCEDURE — 99214 OFFICE O/P EST MOD 30 MIN: CPT | Performed by: INTERNAL MEDICINE

## 2021-01-22 PROCEDURE — 90732 PPSV23 VACC 2 YRS+ SUBQ/IM: CPT | Performed by: INTERNAL MEDICINE

## 2021-01-22 PROCEDURE — 3051F HG A1C>EQUAL 7.0%<8.0%: CPT | Performed by: INTERNAL MEDICINE

## 2021-01-22 RX ORDER — HYDROCHLOROTHIAZIDE 25 MG/1
25 TABLET ORAL DAILY
Qty: 90 TAB | Refills: 3 | Status: SHIPPED | OUTPATIENT
Start: 2021-01-22 | End: 2021-10-27

## 2021-01-22 RX ORDER — HYDROCHLOROTHIAZIDE 25 MG/1
25 TABLET ORAL DAILY
Qty: 30 TAB | Refills: 0 | Status: SHIPPED | OUTPATIENT
Start: 2021-01-22 | End: 2021-10-27

## 2021-01-22 NOTE — PROGRESS NOTES
Patient is in the office today for a 3 month follow up. Patient is requesting a Pneumovax 23 vaccine. 1. Have you been to the ER, urgent care clinic since your last visit? Hospitalized since your last visit? No    2. Have you seen or consulted any other health care providers outside of the [address] [place] [address] since your last visit? Include any pap smears or colon screening. No      Verbal order read back per Dr. Mackey Has Pneumovax 23. Patient received Pneumovax 23 vaccine in left deltoid. Patient was observed and no signs or symptoms of an allergic reaction noted at this time. Patient tolerated well and left without complaints. Patient received Pneumovax 23 VIS.

## 2021-01-22 NOTE — PROGRESS NOTES
Subjective:       Chief Complaint  The patient presents for follow up of diabetes, hypertension and high cholesterol. JOAO Zavaleta is a 76 y.o. male seen for follow up of diabetes. Rosa has hypertension and hyperlipidemia. Diabetes borderline controlled, on Lantus insulin 40 units daily and Humalog 15 units BID with meals, hypertension poorly controlled, in the office and at home on lisinopril 40 mg and coreg 25 mg BD, Plendil 5 mg,   hyperlipidemia no significant medication side effects noted, well controlled, on Lipitor 40 mg which he has not taken consistently. He has h/o CVA. Diet and Lifestyle: generally follows a low fat low cholesterol diet, follows a diabetic diet regularly, exercises sporadically    Home BP Monitoring: is uncontrolled at home,    Diabetic Review of Systems - home glucose monitoring: is performed regularly, 3x/day. Other symptoms and concerns: Patient has a history of asbestosis exposure. Consider chest x-ray follow-up in the near future. Discussed the patient's BMI with him. The BMI follow up plan is as follows: I have counseled this patient on diet and exercise regimens. [place].  It has been over 5 years and he needs to consider another for completeness. Patient is also having difficulty with swallowing at times and would benefit from also have an EGD. He wants to hold off until next office visit due to COVID-19 pandemic    Current Outpatient Medications   Medication Sig    hydroCHLOROthiazide (HYDRODIURIL) 25 mg tablet Take 1 Tab by mouth daily.  hydroCHLOROthiazide (HYDRODIURIL) 25 mg tablet Take 1 Tab by mouth daily.  lisinopriL (PRINIVIL, ZESTRIL) 40 mg tablet Take 1 Tab by mouth daily.  albuterol (PROVENTIL HFA, VENTOLIN HFA, PROAIR HFA) 90 mcg/actuation inhaler Take 2 Puffs by inhalation every four (4) hours as needed for Wheezing.     felodipine (PLENDIL SR) 5 mg 24 hr tablet Take 1 Tab by mouth daily.  atorvastatin (LIPITOR) 40 mg tablet Take 1 Tab by mouth daily.  lansoprazole (Prevacid) 30 mg capsule Take 1 Cap by mouth Daily (before breakfast).  carvediloL (Coreg) 25 mg tablet Take 1 Tab by mouth two (2) times a day.  insulin lispro (HUMALOG) 100 unit/mL kwikpen 15 units BID with meals    aspirin delayed-release 325 mg tablet 325 mg.    glucose blood VI test strips (ONETOUCH ULTRA TEST) strip 50 Each.  insulin glargine (LANTUS SOLOSTAR) 100 unit/mL (3 mL) pen Lantus 24 Units SC daily    glucose blood VI test strips (ASCENSIA CONTOUR) strip     Insulin Needles, Disposable, (AUBREY PEN NEEDLE) 32 x 5/32 \" ndle 1 each.  Lancets misc 1 each.  multivitamins-minerals-lutein (CENTRUM SILVER) Tab Take  by mouth.  sildenafil citrate (VIAGRA) 100 mg tablet Take 1 Tab by mouth as needed. No current facility-administered medications for this visit.               Review of Systems  Respiratory: negative for dyspnea on exertion  Cardiovascular: negative for chest pain    Objective:     Visit Vitals  BP (!) 154/71 (BP 1 Location: Left arm, BP Patient Position: Sitting)   Pulse 65   Temp 97.5 °F (36.4 °C) (Temporal)   Resp 20   Ht 5' 7\" (1.702 m)   Wt 208 lb (94.3 kg)   SpO2 99%   BMI 32.58 kg/m²        Chest - clear to auscultation, no wheezes, rales or rhonchi, symmetric air entry  Heart - normal rate, regular rhythm, normal S1, S2, no murmurs, rubs, clicks or gallops  Extremities - pedal edema 2 +      Labs:   Lab Results   Component Value Date/Time    Cholesterol, total 147 01/14/2021 10:12 AM    HDL Cholesterol 58 01/14/2021 10:12 AM    LDL, calculated 70 01/14/2021 10:12 AM    Triglyceride 95 01/14/2021 10:12 AM    CHOL/HDL Ratio 2.5 01/14/2021 10:12 AM     Lab Results   Component Value Date/Time    Prostate Specific Ag 0.8 10/06/2020 09:10 AM    Prostate Specific Ag 0.8 07/06/2016 09:45 AM    Prostate Specific Ag 1.4 12/30/2015 10:30 AM     Lab Results   Component Value Date/Time    Sodium 140 01/14/2021 10:12 AM    Potassium 4.9 01/14/2021 10:12 AM    Chloride 109 01/14/2021 10:12 AM    CO2 25 01/14/2021 10:12 AM    Anion gap 6 01/14/2021 10:12 AM    Glucose 109 (H) 01/14/2021 10:12 AM    BUN 30 (H) 01/14/2021 10:12 AM    Creatinine 1.60 (H) 01/14/2021 10:12 AM    BUN/Creatinine ratio 19 01/14/2021 10:12 AM    GFR est AA 51 (L) 01/14/2021 10:12 AM    GFR est non-AA 42 (L) 01/14/2021 10:12 AM    Calcium 9.0 01/14/2021 10:12 AM    Bilirubin, total 0.6 01/14/2021 10:12 AM    ALT (SGPT) 35 01/14/2021 10:12 AM    Alk. phosphatase 69 01/14/2021 10:12 AM    Protein, total 6.9 01/14/2021 10:12 AM    Albumin 3.6 01/14/2021 10:12 AM    Globulin 3.3 01/14/2021 10:12 AM    A-G Ratio 1.1 01/14/2021 10:12 AM      Lab Results   Component Value Date/Time    Hemoglobin A1c 7.8 (H) 01/14/2021 10:12 AM            Assessment / Plan     Diabetes borderline controlled, on Lantus 40 units daily and Humalog 15 units 3 times daily with meals. Patient will try improve diet  Hypertension uncontrolled at home. We will continue Coreg and lisinopril and Plendil and add HCTZ 25 mg daily   Hyperlipidemia well controlled, on Lipitor 40 mg. ICD-10-CM ICD-9-CM    1. Type 2 diabetes mellitus with stage 3a chronic kidney disease, with long-term current use of insulin (HCC)  E11.21 250.40 HEMOGLOBIN A1C W/O EAG    N18.31 585. 3     Z79.4 V58.67    2. Mixed hyperlipidemia  E78.2 272.2 LIPID PANEL   3. Essential hypertension  R58 467.7 METABOLIC PANEL, COMPREHENSIVE   4. Encounter for immunization  Z23 V03.89 ADMIN INFLUENZA VIRUS VAC      PNEUMOCOCCAL POLYSACCHARIDE VACCINE, 23-VALENT, ADULT OR IMMUNOSUPPRESSED PT DOSE,            Diabetic issues reviewed with him: diabetic diet discussed in detail, and low cholesterol diet, weight control and daily exercise discussed. Follow-up and Dispositions    · Return in about 4 weeks (around 2/19/2021) for BP check. Reviewed plan of care.  Patient has provided input and agrees with goals.

## 2021-02-17 ENCOUNTER — OFFICE VISIT (OUTPATIENT)
Dept: INTERNAL MEDICINE CLINIC | Age: 76
End: 2021-02-17
Payer: COMMERCIAL

## 2021-02-17 VITALS
BODY MASS INDEX: 32.65 KG/M2 | HEIGHT: 67 IN | HEART RATE: 61 BPM | SYSTOLIC BLOOD PRESSURE: 158 MMHG | WEIGHT: 208 LBS | OXYGEN SATURATION: 99 % | DIASTOLIC BLOOD PRESSURE: 75 MMHG | RESPIRATION RATE: 20 BRPM | TEMPERATURE: 98.1 F

## 2021-02-17 DIAGNOSIS — I10 ESSENTIAL HYPERTENSION: Primary | ICD-10-CM

## 2021-02-17 PROCEDURE — 99212 OFFICE O/P EST SF 10 MIN: CPT | Performed by: INTERNAL MEDICINE

## 2021-02-17 RX ORDER — LANSOPRAZOLE 30 MG/1
30 CAPSULE, DELAYED RELEASE ORAL
Qty: 90 CAP | Refills: 3 | Status: SHIPPED | OUTPATIENT
Start: 2021-02-17 | End: 2022-03-21 | Stop reason: SDUPTHER

## 2021-02-17 NOTE — PATIENT INSTRUCTIONS
High Blood Pressure: Care Instructions Overview It's normal for blood pressure to go up and down throughout the day. But if it stays up, you have high blood pressure. Another name for high blood pressure is hypertension. Despite what a lot of people think, high blood pressure usually doesn't cause headaches or make you feel dizzy or lightheaded. It usually has no symptoms. But it does increase your risk of stroke, heart attack, and other problems. You and your doctor will talk about your risks of these problems based on your blood pressure. Your doctor will give you a goal for your blood pressure. Your goal will be based on your health and your age. Lifestyle changes, such as eating healthy and being active, are always important to help lower blood pressure. You might also take medicine to reach your blood pressure goal. 
Follow-up care is a key part of your treatment and safety. Be sure to make and go to all appointments, and call your doctor if you are having problems. It's also a good idea to know your test results and keep a list of the medicines you take. How can you care for yourself at home? Medical treatment · If you stop taking your medicine, your blood pressure will go back up. You may take one or more types of medicine to lower your blood pressure. Be safe with medicines. Take your medicine exactly as prescribed. Call your doctor if you think you are having a problem with your medicine. · Talk to your doctor before you start taking aspirin every day. Aspirin can help certain people lower their risk of a heart attack or stroke. But taking aspirin isn't right for everyone, because it can cause serious bleeding. · See your doctor regularly. You may need to see the doctor more often at first or until your blood pressure comes down. · If you are taking blood pressure medicine, talk to your doctor before you take decongestants or anti-inflammatory medicine, such as ibuprofen. Some of these medicines can raise blood pressure. · Learn how to check your blood pressure at home. Lifestyle changes · Stay at a healthy weight. This is especially important if you put on weight around the waist. Losing even 10 pounds can help you lower your blood pressure. · If your doctor recommends it, get more exercise. Walking is a good choice. Bit by bit, increase the amount you walk every day. Try for at least 30 minutes on most days of the week. You also may want to swim, bike, or do other activities. · Avoid or limit alcohol. Talk to your doctor about whether you can drink any alcohol. · Try to limit how much sodium you eat to less than 2,300 milligrams (mg) a day. Your doctor may ask you to try to eat less than 1,500 mg a day. · Eat plenty of fruits (such as bananas and oranges), vegetables, legumes, whole grains, and low-fat dairy products. · Lower the amount of saturated fat in your diet. Saturated fat is found in animal products such as milk, cheese, and meat. Limiting these foods may help you lose weight and also lower your risk for heart disease. · Do not smoke. Smoking increases your risk for heart attack and stroke. If you need help quitting, talk to your doctor about stop-smoking programs and medicines. These can increase your chances of quitting for good. When should you call for help? Call  911 anytime you think you may need emergency care. This may mean having symptoms that suggest that your blood pressure is causing a serious heart or blood vessel problem. Your blood pressure may be over 180/120. For example, call 911 if: 
  · You have symptoms of a heart attack. These may include: 
? Chest pain or pressure, or a strange feeling in the chest. 
? Sweating. ? Shortness of breath. ? Nausea or vomiting. ? Pain, pressure, or a strange feeling in the back, neck, jaw, or upper belly or in one or both shoulders or arms. ? Lightheadedness or sudden weakness. ? A fast or irregular heartbeat.  
  · You have symptoms of a stroke. These may include: 
? Sudden numbness, tingling, weakness, or loss of movement in your face, arm, or leg, especially on only one side of your body. ? Sudden vision changes. ? Sudden trouble speaking. ? Sudden confusion or trouble understanding simple statements. ? Sudden problems with walking or balance. ? A sudden, severe headache that is different from past headaches.  
  · You have severe back or belly pain. Do not wait until your blood pressure comes down on its own. Get help right away. Call your doctor now or seek immediate care if: 
  · Your blood pressure is much higher than normal (such as 180/120 or higher), but you don't have symptoms.  
  · You think high blood pressure is causing symptoms, such as: 
? Severe headache. 
? Blurry vision. Watch closely for changes in your health, and be sure to contact your doctor if: 
  · Your blood pressure measures higher than your doctor recommends at least 2 times. That means the top number is higher or the bottom number is higher, or both.  
  · You think you may be having side effects from your blood pressure medicine. Where can you learn more? Go to http://www.gray.com/ Enter R893 in the search box to learn more about \"High Blood Pressure: Care Instructions. \" Current as of: December 16, 2019               Content Version: 12.6 © 8826-2131 [employer]. Care instructions adapted under license by [employer] (which disclaims liability or warranty for this information). If you have questions about a medical condition or this instruction, always ask your healthcare professional. [address] any warranty or liability for your use of this information.

## 2021-02-17 NOTE — PROGRESS NOTES
Patient is in the office today for a 4week follow up. 1. Have you been to the ER, urgent care clinic since your last visit? Hospitalized since your last visit? No    2. Have you seen or consulted any other health care providers outside of the [address] [place] [address] since your last visit? Include any pap smears or colon screening.  No

## 2021-02-23 NOTE — PROGRESS NOTES
Roebrt Castellanos is a 76 y.o.  male and presents with Blood Pressure Check      SUBJECTIVE:    Patient high blood pressure remains borderline controlled in the office the patient says is better at home. He is currently on HCTZ 25 mg daily, lisinopril 40 mg daily, Coreg 25 mg twice daily and Plendil 5 mg daily. Patient will try and bring in his blood pressure cuff at next office visit so we can see if he has any whitecoat hypertension. Current Outpatient Medications   Medication Sig    lansoprazole (Prevacid) 30 mg capsule Take 1 Cap by mouth Daily (before breakfast).  hydroCHLOROthiazide (HYDRODIURIL) 25 mg tablet Take 1 Tab by mouth daily.  hydroCHLOROthiazide (HYDRODIURIL) 25 mg tablet Take 1 Tab by mouth daily.  lisinopriL (PRINIVIL, ZESTRIL) 40 mg tablet Take 1 Tab by mouth daily.  albuterol (PROVENTIL HFA, VENTOLIN HFA, PROAIR HFA) 90 mcg/actuation inhaler Take 2 Puffs by inhalation every four (4) hours as needed for Wheezing.  felodipine (PLENDIL SR) 5 mg 24 hr tablet Take 1 Tab by mouth daily.  atorvastatin (LIPITOR) 40 mg tablet Take 1 Tab by mouth daily.  carvediloL (Coreg) 25 mg tablet Take 1 Tab by mouth two (2) times a day.  insulin lispro (HUMALOG) 100 unit/mL kwikpen 15 units BID with meals    aspirin delayed-release 325 mg tablet 325 mg.    glucose blood VI test strips (ONETOUCH ULTRA TEST) strip 50 Each.  insulin glargine (LANTUS SOLOSTAR) 100 unit/mL (3 mL) pen Lantus 24 Units SC daily    glucose blood VI test strips (ASCENSIA CONTOUR) strip     Insulin Needles, Disposable, (AUBREY PEN NEEDLE) 32 x 5/32 \" ndle 1 each.  Lancets misc 1 each.  multivitamins-minerals-lutein (CENTRUM SILVER) Tab Take  by mouth.  sildenafil citrate (VIAGRA) 100 mg tablet Take 1 Tab by mouth as needed. No current facility-administered medications for this visit.           OBJECTIVE:  alert, well appearing, and in no distress  Visit Vitals  BP (!) 158/75 (BP 1 Location: Left arm, BP Patient Position: Sitting, BP Cuff Size: Adult)   Pulse 61   Temp 98.1 °F (36.7 °C) (Temporal)   Resp 20   Ht 5' 7\" (1.702 m)   Wt 208 lb (94.3 kg)   SpO2 99%   BMI 32.58 kg/m²      well developed and well nourished  Chest - clear to auscultation, no wheezes, rales or rhonchi, symmetric air entry  Heart - normal rate, regular rhythm, normal S1, S2, no murmurs, rubs, clicks or gallops        Labs:   Lab Results   Component Value Date/Time    Sodium 140 01/14/2021 10:12 AM    Potassium 4.9 01/14/2021 10:12 AM    Chloride 109 01/14/2021 10:12 AM    CO2 25 01/14/2021 10:12 AM    Anion gap 6 01/14/2021 10:12 AM    Glucose 109 (H) 01/14/2021 10:12 AM    BUN 30 (H) 01/14/2021 10:12 AM    Creatinine 1.60 (H) 01/14/2021 10:12 AM    BUN/Creatinine ratio 19 01/14/2021 10:12 AM    GFR est AA 51 (L) 01/14/2021 10:12 AM    GFR est non-AA 42 (L) 01/14/2021 10:12 AM    Calcium 9.0 01/14/2021 10:12 AM    Bilirubin, total 0.6 01/14/2021 10:12 AM    ALT (SGPT) 35 01/14/2021 10:12 AM    Alk. phosphatase 69 01/14/2021 10:12 AM    Protein, total 6.9 01/14/2021 10:12 AM    Albumin 3.6 01/14/2021 10:12 AM    Globulin 3.3 01/14/2021 10:12 AM    A-G Ratio 1.1 01/14/2021 10:12 AM        Discussed the patient's BMI with him. The BMI follow up plan is as follows: I have counseled this patient on diet and exercise regimens. Assessment/Plan      ICD-10-CM ICD-9-CM    1. Essential hypertension  I10 401.9  uncontrolled in the office. Concerned about possible whitecoat hypertension. Patient to bring in blood pressure cuff at next office visit. He will continue on multiple blood pressure medicines as mentioned     Follow-up and Dispositions    · Return in about 3 months (around 5/17/2021) for labs 1 week before. Reviewed plan of care. Patient has provided input and agrees with goals.

## 2021-03-22 RX ORDER — ALBUTEROL SULFATE 90 UG/1
2 AEROSOL, METERED RESPIRATORY (INHALATION)
Qty: 1 INHALER | Refills: 0 | Status: SHIPPED | OUTPATIENT
Start: 2021-03-22 | End: 2021-04-04

## 2021-03-22 NOTE — TELEPHONE ENCOUNTER
Requested Prescriptions     Pending Prescriptions Disp Refills    albuterol (PROVENTIL HFA, VENTOLIN HFA, PROAIR HFA) 90 mcg/actuation inhaler 1 Inhaler 0     Sig: Take 2 Puffs by inhalation every four (4) hours as needed for Wheezing.

## 2021-04-06 ENCOUNTER — TELEPHONE (OUTPATIENT)
Dept: INTERNAL MEDICINE CLINIC | Age: 76
End: 2021-04-06

## 2021-04-06 NOTE — TELEPHONE ENCOUNTER
Patient called upset that he has received a bill for labs for October and January dates of service. Patient states that his labs should be sent to [facility] and he is not coming to our office anymore for labs.   He will get a lab slip and take to [facility].  I have asked him to drop by a copy of the bill so that I can have our lab look into why it was not sent to [facility].

## 2021-04-08 NOTE — TELEPHONE ENCOUNTER
Forwarded bill on to manager of the lab, [employer]. The amount the patient owes is his deductible , he has a high option insurance. Patient was notified and would like to have May labs at [facility].  Lab order mailed to patient and appointment for labs in the office was cancelled.

## 2021-04-22 ENCOUNTER — TELEPHONE (OUTPATIENT)
Dept: INTERNAL MEDICINE CLINIC | Age: 76
End: 2021-04-22

## 2021-04-22 RX ORDER — CARVEDILOL 25 MG/1
25 TABLET ORAL 2 TIMES DAILY
Qty: 180 TAB | Refills: 3 | Status: SHIPPED | OUTPATIENT
Start: 2021-04-22

## 2021-04-22 RX ORDER — LANSOPRAZOLE 30 MG/1
30 CAPSULE, DELAYED RELEASE ORAL
Qty: 90 CAP | Refills: 3 | Status: CANCELLED | OUTPATIENT
Start: 2021-04-22

## 2021-04-22 NOTE — TELEPHONE ENCOUNTER
Last Visit: 2/17/21 with MD Abdi  Next Appointment: 5/27/21 with MD Abdi  Previous Refill Encounter(s): 6/8/20 #180 with 3 refills    Requested Prescriptions     Pending Prescriptions Disp Refills    carvediloL (Coreg) 25 mg tablet 180 Tab 3     Sig: Take 1 Tab by mouth two (2) times a day.        New Rx for Prevacid was sent on 2/17/21 #90 with 3 refills

## 2021-04-28 RX ORDER — LANSOPRAZOLE 30 MG/1
30 CAPSULE, DELAYED RELEASE ORAL
Qty: 90 CAP | Refills: 3 | Status: CANCELLED | OUTPATIENT
Start: 2021-04-28

## 2021-04-29 RX ORDER — CARVEDILOL 25 MG/1
25 TABLET ORAL 2 TIMES DAILY
Qty: 180 TAB | Refills: 3 | Status: CANCELLED | OUTPATIENT
Start: 2021-04-29

## 2021-04-29 NOTE — TELEPHONE ENCOUNTER
Spoke with [facility] rep, they have a refill in process for lansoprazole, and carvedilol is not due to be filled until 5/25/2021. Patient verbalizes understanding.

## 2021-04-29 NOTE — TELEPHONE ENCOUNTER
Coreg was sent to the pharmacy on 21 #180 with 3 refills. I confirmed with [facility] they received the Rx. It is due to fill after 21. Patient still has refills at the pharmacy for Prevacid. I contacted the pharmacy to confirm and requested they refill it for the patient. I contacted patient, name and  were verified, and advised him of this information. Patient verbalized understanding. No further action needed.

## 2021-04-29 NOTE — TELEPHONE ENCOUNTER
Patient stating he called [facility] and they told him that he does not have any refills on the Prevacid and they did not receive the rx for the Carvedilol.

## 2021-05-18 LAB
ALBUMIN SERPL-MCNC: 3.9 G/DL (ref 3.7–4.7)
ALBUMIN/GLOB SERPL: 1.6 {RATIO} (ref 1.2–2.2)
ALP SERPL-CCNC: 83 IU/L (ref 48–121)
ALT SERPL-CCNC: 24 IU/L (ref 0–44)
AST SERPL-CCNC: 20 IU/L (ref 0–40)
BILIRUB SERPL-MCNC: <0.2 MG/DL (ref 0–1.2)
BUN SERPL-MCNC: 40 MG/DL (ref 8–27)
BUN/CREAT SERPL: 21 (ref 10–24)
CALCIUM SERPL-MCNC: 9 MG/DL (ref 8.6–10.2)
CHLORIDE SERPL-SCNC: 104 MMOL/L (ref 96–106)
CHOLEST SERPL-MCNC: 159 MG/DL (ref 100–199)
CO2 SERPL-SCNC: 20 MMOL/L (ref 20–29)
CREAT SERPL-MCNC: 1.92 MG/DL (ref 0.76–1.27)
GLOBULIN SER CALC-MCNC: 2.5 G/DL (ref 1.5–4.5)
GLUCOSE SERPL-MCNC: 224 MG/DL (ref 65–99)
HBA1C MFR BLD: 7.8 % (ref 4.8–5.6)
HDLC SERPL-MCNC: 48 MG/DL
IMP & REVIEW OF LAB RESULTS: NORMAL
INTERPRETATION: NORMAL
LDLC SERPL CALC-MCNC: 71 MG/DL (ref 0–99)
POTASSIUM SERPL-SCNC: 4.8 MMOL/L (ref 3.5–5.2)
PROT SERPL-MCNC: 6.4 G/DL (ref 6–8.5)
SODIUM SERPL-SCNC: 136 MMOL/L (ref 134–144)
TRIGL SERPL-MCNC: 244 MG/DL (ref 0–149)
VLDLC SERPL CALC-MCNC: 40 MG/DL (ref 5–40)

## 2021-05-27 ENCOUNTER — OFFICE VISIT (OUTPATIENT)
Dept: INTERNAL MEDICINE CLINIC | Age: 76
End: 2021-05-27
Payer: COMMERCIAL

## 2021-05-27 ENCOUNTER — TELEPHONE (OUTPATIENT)
Dept: INTERNAL MEDICINE CLINIC | Age: 76
End: 2021-05-27

## 2021-05-27 VITALS
HEART RATE: 62 BPM | TEMPERATURE: 97.3 F | WEIGHT: 210 LBS | DIASTOLIC BLOOD PRESSURE: 59 MMHG | RESPIRATION RATE: 18 BRPM | BODY MASS INDEX: 32.96 KG/M2 | HEIGHT: 67 IN | SYSTOLIC BLOOD PRESSURE: 123 MMHG | OXYGEN SATURATION: 97 %

## 2021-05-27 DIAGNOSIS — Z12.11 COLON CANCER SCREENING: ICD-10-CM

## 2021-05-27 DIAGNOSIS — E78.2 MIXED HYPERLIPIDEMIA: ICD-10-CM

## 2021-05-27 DIAGNOSIS — I10 ESSENTIAL HYPERTENSION: ICD-10-CM

## 2021-05-27 DIAGNOSIS — J45.20 MILD INTERMITTENT ASTHMA WITHOUT COMPLICATION: ICD-10-CM

## 2021-05-27 DIAGNOSIS — E11.22 TYPE 2 DIABETES MELLITUS WITH STAGE 3B CHRONIC KIDNEY DISEASE, WITH LONG-TERM CURRENT USE OF INSULIN (HCC): Primary | ICD-10-CM

## 2021-05-27 DIAGNOSIS — Z79.4 TYPE 2 DIABETES MELLITUS WITH STAGE 3B CHRONIC KIDNEY DISEASE, WITH LONG-TERM CURRENT USE OF INSULIN (HCC): Primary | ICD-10-CM

## 2021-05-27 DIAGNOSIS — N18.32 TYPE 2 DIABETES MELLITUS WITH STAGE 3B CHRONIC KIDNEY DISEASE, WITH LONG-TERM CURRENT USE OF INSULIN (HCC): Primary | ICD-10-CM

## 2021-05-27 PROCEDURE — 99214 OFFICE O/P EST MOD 30 MIN: CPT | Performed by: INTERNAL MEDICINE

## 2021-05-27 PROCEDURE — 3051F HG A1C>EQUAL 7.0%<8.0%: CPT | Performed by: INTERNAL MEDICINE

## 2021-05-27 RX ORDER — BUDESONIDE AND FORMOTEROL FUMARATE DIHYDRATE 160; 4.5 UG/1; UG/1
2 AEROSOL RESPIRATORY (INHALATION) 2 TIMES DAILY
Qty: 3 INHALER | Refills: 3 | Status: SHIPPED | OUTPATIENT
Start: 2021-05-27 | End: 2021-05-27 | Stop reason: RX

## 2021-05-27 RX ORDER — BUDESONIDE AND FORMOTEROL FUMARATE DIHYDRATE 80; 4.5 UG/1; UG/1
2 AEROSOL RESPIRATORY (INHALATION) 2 TIMES DAILY
Qty: 3 INHALER | Refills: 3 | Status: SHIPPED | OUTPATIENT
Start: 2021-05-27 | End: 2021-05-27 | Stop reason: SDUPTHER

## 2021-05-27 RX ORDER — FLUTICASONE PROPIONATE AND SALMETEROL 250; 50 UG/1; UG/1
1 POWDER RESPIRATORY (INHALATION) EVERY 12 HOURS
Qty: 3 INHALER | Refills: 3 | Status: SHIPPED | OUTPATIENT
Start: 2021-05-27 | End: 2021-11-04 | Stop reason: SDUPTHER

## 2021-05-27 NOTE — PROGRESS NOTES
Subjective:       Chief Complaint  The patient presents for follow up of diabetes, hypertension and high cholesterol. HPI  Carissa Leiva is a 76 y.o. male seen for follow up of diabetes. Healso has hypertension and hyperlipidemia. Diabetes uncontrolled, on Lantus insulin 40 units daily and Humalog 15 units BID with meals, hypertension well controlled,  on lisinopril 40 mg and coreg 25 mg BD, Plendil 5 mg and HCTZ 25 mg daily, patient does have a history of chronic kidney disease for which she was seeing nephrology in [place] we will go ahead and refer back to renal and try to see if we can improve some of his azotemia by decreasing HCTZ to every other day, hyperlipidemia no significant medication side effects noted, well controlled, on Lipitor 40 mg. . He has h/o CVA. Diet and Lifestyle: generally follows a low fat low cholesterol diet, follows a diabetic diet regularly, exercises sporadically    Home BP Monitoring: is controlled at home,    Diabetic Review of Systems - home glucose monitoring: is performed regularly, 3x/day. Other symptoms and concerns: Patient has a history of asbestosis exposure. Consider chest x-ray follow-up in the near future. Discussed the patient's BMI with him. The BMI follow up plan is as follows: I have counseled this patient on diet and exercise regimens. [place] in the past.  It has been over 5 years and he needs to consider another for completeness. Will do Cologuard for now. Patient has a history of asthma so we will go ahead and try him on Advair since he is using his albuterol more often. Patient continues on vitamin D 5000 units/day for his vitamin D deficiency  Current Outpatient Medications   Medication Sig    carvediloL (Coreg) 25 mg tablet Take 1 Tab by mouth two (2) times a day.     albuterol (PROVENTIL HFA, VENTOLIN HFA, PROAIR HFA) 90 mcg/actuation inhaler INHALE 2 PUFFS BY MOUTH EVERY 4 HOURS AS NEEDED FOR WHEEZING    lansoprazole (Prevacid) 30 mg capsule Take 1 Cap by mouth Daily (before breakfast).  hydroCHLOROthiazide (HYDRODIURIL) 25 mg tablet Take 1 Tab by mouth daily.  hydroCHLOROthiazide (HYDRODIURIL) 25 mg tablet Take 1 Tab by mouth daily.  lisinopriL (PRINIVIL, ZESTRIL) 40 mg tablet Take 1 Tab by mouth daily.  felodipine (PLENDIL SR) 5 mg 24 hr tablet Take 1 Tab by mouth daily.  atorvastatin (LIPITOR) 40 mg tablet Take 1 Tab by mouth daily.  insulin lispro (HUMALOG) 100 unit/mL kwikpen 15 units BID with meals    aspirin delayed-release 325 mg tablet 325 mg.    glucose blood VI test strips (ONETOUCH ULTRA TEST) strip 50 Each.  insulin glargine (LANTUS SOLOSTAR) 100 unit/mL (3 mL) pen Lantus 24 Units SC daily    glucose blood VI test strips (ASCENSIA CONTOUR) strip     Insulin Needles, Disposable, (AUBREY PEN NEEDLE) 32 x 5/32 \" ndle 1 each.  Lancets misc 1 each.  multivitamins-minerals-lutein (CENTRUM SILVER) Tab Take  by mouth.  fluticasone propion-salmeteroL (ADVAIR/WIXELA) 250-50 mcg/dose diskus inhaler Take 1 Puff by inhalation every twelve (12) hours.  sildenafil citrate (VIAGRA) 100 mg tablet Take 1 Tab by mouth as needed. (Patient not taking: Reported on 5/27/2021)     No current facility-administered medications for this visit.              Review of Systems  Respiratory: negative for dyspnea on exertion  Cardiovascular: negative for chest pain    Objective:     Visit Vitals  BP (!) 123/59 (BP 1 Location: Left arm, BP Patient Position: Sitting, BP Cuff Size: Adult)   Pulse 62   Temp 97.3 °F (36.3 °C) (Temporal)   Resp 18   Ht 5' 7\" (1.702 m)   Wt 210 lb (95.3 kg)   SpO2 97%   BMI 32.89 kg/m²        Chest - clear to auscultation, no wheezes, rales or rhonchi, symmetric air entry  Heart - normal rate and regular rhythm, regular rhythm, systolic murmur 2/6 at 2nd left intercostal space  Extremities - pedal edema 1 +      Labs:   Lab Results   Component Value Date/Time    Cholesterol, total 159 05/17/2021 08:23 AM    HDL Cholesterol 48 05/17/2021 08:23 AM    LDL, calculated 71 05/17/2021 08:23 AM    LDL, calculated 70 01/14/2021 10:12 AM    Triglyceride 244 (H) 05/17/2021 08:23 AM    CHOL/HDL Ratio 2.5 01/14/2021 10:12 AM     Lab Results   Component Value Date/Time    Prostate Specific Ag 0.8 10/06/2020 09:10 AM    Prostate Specific Ag 0.8 07/06/2016 09:45 AM    Prostate Specific Ag 1.4 12/30/2015 10:30 AM     Lab Results   Component Value Date/Time    Sodium 136 05/17/2021 08:23 AM    Potassium 4.8 05/17/2021 08:23 AM    Chloride 104 05/17/2021 08:23 AM    CO2 20 05/17/2021 08:23 AM    Anion gap 6 01/14/2021 10:12 AM    Glucose 224 (H) 05/17/2021 08:23 AM    BUN 40 (H) 05/17/2021 08:23 AM    Creatinine 1.92 (H) 05/17/2021 08:23 AM    BUN/Creatinine ratio 21 05/17/2021 08:23 AM    GFR est AA 39 (L) 05/17/2021 08:23 AM    GFR est non-AA 33 (L) 05/17/2021 08:23 AM    Calcium 9.0 05/17/2021 08:23 AM    Bilirubin, total <0.2 05/17/2021 08:23 AM    ALT (SGPT) 24 05/17/2021 08:23 AM    Alk. phosphatase 83 05/17/2021 08:23 AM    Protein, total 6.4 05/17/2021 08:23 AM    Albumin 3.9 05/17/2021 08:23 AM    Globulin 3.3 01/14/2021 10:12 AM    A-G Ratio 1.6 05/17/2021 08:23 AM      Lab Results   Component Value Date/Time    Hemoglobin A1c 7.8 (H) 05/17/2021 08:23 AM            Assessment / Plan     Diabetes borderline controlled, on Lantus 40 units daily and Humalog 15 units 3 times daily with meals. Patient will try improve diet  Hypertension well controlled on Coreg and lisinopril and Plendil and  HCTZ 25 mg daily. We will try to decrease HCTZ to every other day to see if it improves his azotemia  Hyperlipidemia well controlled, on Lipitor 40 mg. ICD-10-CM ICD-9-CM    1. Type 2 diabetes mellitus with stage 3b chronic kidney disease, with long-term current use of insulin (HCC)  E11.22 250.40 REFERRAL TO NEPHROLOGY    N18.32 585.3 HEMOGLOBIN A1C W/O EAG    Z79.4 V58.67    2. Essential hypertension  D86 936.8 METABOLIC PANEL, COMPREHENSIVE   3. Mixed hyperlipidemia  E78.2 272.2 LIPID PANEL   4. Mild intermittent asthma without complication  R14.75 783.42  will treat with Advair   5. Colon cancer screening  Z12.11 V76.51 COLOGUARD TEST (FECAL DNA COLORECTAL CANCER SCREENING)            Diabetic issues reviewed with him: diabetic diet discussed in detail, and low cholesterol diet, weight control and daily exercise discussed. Follow-up and Dispositions    · Return in about 3 months (around 8/27/2021) for labs 1 week before. Reviewed plan of care. Patient has provided input and agrees with goals.

## 2021-05-27 NOTE — PROGRESS NOTES
Patient is in the office today for a 3 month follow up. Do you have an Advance Directive no  Do you want more information : information given    1. Have you been to the ER, urgent care clinic since your last visit? Hospitalized since your last visit? No    2. Have you seen or consulted any other health care providers outside of the [address] [place] [address] since your last visit? Include any pap smears or colon screening. yes, Dr. Constance Sandoval, and  Dr. Liliana Cole.

## 2021-05-27 NOTE — PATIENT INSTRUCTIONS
High Blood Pressure: Care Instructions Overview It's normal for blood pressure to go up and down throughout the day. But if it stays up, you have high blood pressure. Another name for high blood pressure is hypertension. Despite what a lot of people think, high blood pressure usually doesn't cause headaches or make you feel dizzy or lightheaded. It usually has no symptoms. But it does increase your risk of stroke, heart attack, and other problems. You and your doctor will talk about your risks of these problems based on your blood pressure. Your doctor will give you a goal for your blood pressure. Your goal will be based on your health and your age. Lifestyle changes, such as eating healthy and being active, are always important to help lower blood pressure. You might also take medicine to reach your blood pressure goal. 
Follow-up care is a key part of your treatment and safety. Be sure to make and go to all appointments, and call your doctor if you are having problems. It's also a good idea to know your test results and keep a list of the medicines you take. How can you care for yourself at home? Medical treatment · If you stop taking your medicine, your blood pressure will go back up. You may take one or more types of medicine to lower your blood pressure. Be safe with medicines. Take your medicine exactly as prescribed. Call your doctor if you think you are having a problem with your medicine. · Talk to your doctor before you start taking aspirin every day. Aspirin can help certain people lower their risk of a heart attack or stroke. But taking aspirin isn't right for everyone, because it can cause serious bleeding. · See your doctor regularly. You may need to see the doctor more often at first or until your blood pressure comes down. · If you are taking blood pressure medicine, talk to your doctor before you take decongestants or anti-inflammatory medicine, such as ibuprofen.  Some of these medicines can raise blood pressure. · Learn how to check your blood pressure at home. Lifestyle changes · Stay at a healthy weight. This is especially important if you put on weight around the waist. Losing even 10 pounds can help you lower your blood pressure. · If your doctor recommends it, get more exercise. Walking is a good choice. Bit by bit, increase the amount you walk every day. Try for at least 30 minutes on most days of the week. You also may want to swim, bike, or do other activities. · Avoid or limit alcohol. Talk to your doctor about whether you can drink any alcohol. · Try to limit how much sodium you eat to less than 2,300 milligrams (mg) a day. Your doctor may ask you to try to eat less than 1,500 mg a day. · Eat plenty of fruits (such as bananas and oranges), vegetables, legumes, whole grains, and low-fat dairy products. · Lower the amount of saturated fat in your diet. Saturated fat is found in animal products such as milk, cheese, and meat. Limiting these foods may help you lose weight and also lower your risk for heart disease. · Do not smoke. Smoking increases your risk for heart attack and stroke. If you need help quitting, talk to your doctor about stop-smoking programs and medicines. These can increase your chances of quitting for good. When should you call for help? Call  911 anytime you think you may need emergency care. This may mean having symptoms that suggest that your blood pressure is causing a serious heart or blood vessel problem. Your blood pressure may be over 180/120. For example, call 911 if: 
  · You have symptoms of a heart attack. These may include: 
? Chest pain or pressure, or a strange feeling in the chest. 
? Sweating. ? Shortness of breath. ? Nausea or vomiting. ? Pain, pressure, or a strange feeling in the back, neck, jaw, or upper belly or in one or both shoulders or arms. ? Lightheadedness or sudden weakness.  
? A fast or irregular heartbeat.  
  · You have symptoms of a stroke. These may include: 
? Sudden numbness, tingling, weakness, or loss of movement in your face, arm, or leg, especially on only one side of your body. ? Sudden vision changes. ? Sudden trouble speaking. ? Sudden confusion or trouble understanding simple statements. ? Sudden problems with walking or balance. ? A sudden, severe headache that is different from past headaches.  
  · You have severe back or belly pain. Do not wait until your blood pressure comes down on its own. Get help right away. Call your doctor now or seek immediate care if: 
  · Your blood pressure is much higher than normal (such as 180/120 or higher), but you don't have symptoms.  
  · You think high blood pressure is causing symptoms, such as: 
? Severe headache. 
? Blurry vision. Watch closely for changes in your health, and be sure to contact your doctor if: 
  · Your blood pressure measures higher than your doctor recommends at least 2 times. That means the top number is higher or the bottom number is higher, or both.  
  · You think you may be having side effects from your blood pressure medicine. Where can you learn more? Go to http://www.gray.com/ Enter J251 in the search box to learn more about \"High Blood Pressure: Care Instructions. \" Current as of: August 31, 2020               Content Version: 12.8 © 2006-2021 [employer]. Care instructions adapted under license by [employer] (which disclaims liability or warranty for this information). If you have questions about a medical condition or this instruction, always ask your healthcare professional. [place] [address] any warranty or liability for your use of this information. Advance Directives: Care Instructions Overview An advance directive is a legal way to state your wishes at the end of your life.  It tells your family and your doctor what to do if you can't say what you want. There are two main types of advance directives. You can change them any time your wishes change. Living will. This form tells your family and your doctor your wishes about life support and other treatment. The form is also called a declaration. Medical power of . This form lets you name a person to make treatment decisions for you when you can't speak for yourself. This person is called a health care agent (health care proxy, health care surrogate). The form is also called a durable power of  for health care. If you do not have an advance directive, decisions about your medical care may be made by a family member, or by a doctor or a  who doesn't know you. It may help to think of an advance directive as a gift to the people who care for you. If you have one, they won't have to make tough decisions by themselves. Follow-up care is a key part of your treatment and safety. Be sure to make and go to all appointments, and call your doctor if you are having problems. It's also a good idea to know your test results and keep a list of the medicines you take. What should you include in an advance directive? Many states have a unique advance directive form. (It may ask you to address specific issues.) Or you might use a universal form that's approved by many states. If your form doesn't tell you what to address, it may be hard to know what to include in your advance directive. Use the questions below to help you get started. · Who do you want to make decisions about your medical care if you are not able to? · What life-support measures do you want if you have a serious illness that gets worse over time or can't be cured? · What are you most afraid of that might happen? (Maybe you're afraid of having pain, losing your independence, or being kept alive by machines.) · Where would you prefer to die? (Your home? A hospital? A nursing home?) · Do you want to donate your organs when you die? · Do you want certain Holiness practices performed before you die? When should you call for help? Be sure to contact your doctor if you have any questions. Where can you learn more? Go to http://www.gray.com/ Enter R264 in the search box to learn more about \"Advance Directives: Care Instructions. \" Current as of: July 17, 2020               Content Version: 12.8 © 2006-2021 [employer], [employer]. Care instructions adapted under license by [employer] (which disclaims liability or warranty for this information). If you have questions about a medical condition or this instruction, always ask your healthcare professional. [address] any warranty or liability for your use of this information.

## 2021-06-21 ENCOUNTER — TELEPHONE (OUTPATIENT)
Dept: INTERNAL MEDICINE CLINIC | Age: 76
End: 2021-06-21

## 2021-07-16 ENCOUNTER — TRANSCRIBE ORDER (OUTPATIENT)
Dept: SCHEDULING | Age: 76
End: 2021-07-16

## 2021-07-16 DIAGNOSIS — N18.32 STAGE 3B CHRONIC KIDNEY DISEASE (HCC): Primary | ICD-10-CM

## 2021-07-22 ENCOUNTER — HOSPITAL ENCOUNTER (OUTPATIENT)
Dept: ULTRASOUND IMAGING | Age: 76
Discharge: HOME | End: 2021-07-22
Attending: INTERNAL MEDICINE
Payer: COMMERCIAL

## 2021-07-22 DIAGNOSIS — N18.32 STAGE 3B CHRONIC KIDNEY DISEASE (HCC): ICD-10-CM

## 2021-07-22 PROCEDURE — 76770 US EXAM ABDO BACK WALL COMP: CPT

## 2021-08-06 RX ORDER — LISINOPRIL 40 MG/1
40 TABLET ORAL DAILY
Qty: 90 TABLET | Refills: 3 | Status: SHIPPED | OUTPATIENT
Start: 2021-08-06 | End: 2021-10-27

## 2021-08-06 NOTE — TELEPHONE ENCOUNTER
Last Visit: 5/27/21 with MD Abdi  Next Appointment: 8/27/21 with MD Abdi  Previous Refill Encounter(s): 11/4/20 #90 with 3 refills    Requested Prescriptions     Pending Prescriptions Disp Refills    lisinopriL (PRINIVIL, ZESTRIL) 40 mg tablet 90 Tablet 3     Sig: Take 1 Tablet by mouth daily.

## 2021-08-21 LAB
ALBUMIN SERPL-MCNC: 4 G/DL (ref 3.7–4.7)
ALBUMIN/GLOB SERPL: 1.5 {RATIO} (ref 1.2–2.2)
ALP SERPL-CCNC: 64 IU/L (ref 48–121)
ALT SERPL-CCNC: 23 IU/L (ref 0–44)
AST SERPL-CCNC: 25 IU/L (ref 0–40)
BILIRUB SERPL-MCNC: 0.3 MG/DL (ref 0–1.2)
BUN SERPL-MCNC: 32 MG/DL (ref 8–27)
BUN/CREAT SERPL: 21 (ref 10–24)
CALCIUM SERPL-MCNC: 9.7 MG/DL (ref 8.6–10.2)
CHLORIDE SERPL-SCNC: 106 MMOL/L (ref 96–106)
CHOLEST SERPL-MCNC: 148 MG/DL (ref 100–199)
CO2 SERPL-SCNC: 22 MMOL/L (ref 20–29)
CREAT SERPL-MCNC: 1.54 MG/DL (ref 0.76–1.27)
GLOBULIN SER CALC-MCNC: 2.7 G/DL (ref 1.5–4.5)
GLUCOSE SERPL-MCNC: 185 MG/DL (ref 65–99)
HBA1C MFR BLD: 9.3 % (ref 4.8–5.6)
HDLC SERPL-MCNC: 55 MG/DL
IMP & REVIEW OF LAB RESULTS: NORMAL
INTERPRETATION: NORMAL
LDLC SERPL CALC-MCNC: 76 MG/DL (ref 0–99)
POTASSIUM SERPL-SCNC: 5 MMOL/L (ref 3.5–5.2)
PROT SERPL-MCNC: 6.7 G/DL (ref 6–8.5)
SODIUM SERPL-SCNC: 139 MMOL/L (ref 134–144)
TRIGL SERPL-MCNC: 94 MG/DL (ref 0–149)
VLDLC SERPL CALC-MCNC: 17 MG/DL (ref 5–40)

## 2021-08-24 RX ORDER — ATORVASTATIN CALCIUM 40 MG/1
TABLET, FILM COATED ORAL
Qty: 90 TABLET | Refills: 3 | Status: SHIPPED | OUTPATIENT
Start: 2021-08-24 | End: 2022-10-07 | Stop reason: SDUPTHER

## 2021-08-27 ENCOUNTER — OFFICE VISIT (OUTPATIENT)
Dept: INTERNAL MEDICINE CLINIC | Age: 76
End: 2021-08-27
Payer: COMMERCIAL

## 2021-08-27 VITALS
RESPIRATION RATE: 16 BRPM | SYSTOLIC BLOOD PRESSURE: 137 MMHG | HEIGHT: 67 IN | BODY MASS INDEX: 32.8 KG/M2 | TEMPERATURE: 98.7 F | HEART RATE: 62 BPM | WEIGHT: 209 LBS | DIASTOLIC BLOOD PRESSURE: 62 MMHG | OXYGEN SATURATION: 99 %

## 2021-08-27 DIAGNOSIS — I73.9 PVD (PERIPHERAL VASCULAR DISEASE) (HCC): ICD-10-CM

## 2021-08-27 DIAGNOSIS — N18.32 TYPE 2 DIABETES MELLITUS WITH STAGE 3B CHRONIC KIDNEY DISEASE, WITH LONG-TERM CURRENT USE OF INSULIN (HCC): Primary | ICD-10-CM

## 2021-08-27 DIAGNOSIS — J61 ASBESTOSIS (HCC): ICD-10-CM

## 2021-08-27 DIAGNOSIS — E78.2 MIXED HYPERLIPIDEMIA: ICD-10-CM

## 2021-08-27 DIAGNOSIS — Z12.5 PROSTATE CANCER SCREENING: ICD-10-CM

## 2021-08-27 DIAGNOSIS — I10 ESSENTIAL HYPERTENSION: ICD-10-CM

## 2021-08-27 DIAGNOSIS — Z79.4 TYPE 2 DIABETES MELLITUS WITH STAGE 3B CHRONIC KIDNEY DISEASE, WITH LONG-TERM CURRENT USE OF INSULIN (HCC): Primary | ICD-10-CM

## 2021-08-27 DIAGNOSIS — E11.22 TYPE 2 DIABETES MELLITUS WITH STAGE 3B CHRONIC KIDNEY DISEASE, WITH LONG-TERM CURRENT USE OF INSULIN (HCC): Primary | ICD-10-CM

## 2021-08-27 PROCEDURE — 99214 OFFICE O/P EST MOD 30 MIN: CPT | Performed by: INTERNAL MEDICINE

## 2021-08-27 RX ORDER — FLASH GLUCOSE SCANNING READER
EACH MISCELLANEOUS
COMMUNITY

## 2021-08-27 NOTE — PATIENT INSTRUCTIONS
High Blood Pressure: Care Instructions  Overview     It's normal for blood pressure to go up and down throughout the day. But if it stays up, you have high blood pressure. Another name for high blood pressure is hypertension. Despite what a lot of people think, high blood pressure usually doesn't cause headaches or make you feel dizzy or lightheaded. It usually has no symptoms. But it does increase your risk of stroke, heart attack, and other problems. You and your doctor will talk about your risks of these problems based on your blood pressure. Your doctor will give you a goal for your blood pressure. Your goal will be based on your health and your age. Lifestyle changes, such as eating healthy and being active, are always important to help lower blood pressure. You might also take medicine to reach your blood pressure goal.  Follow-up care is a key part of your treatment and safety. Be sure to make and go to all appointments, and call your doctor if you are having problems. It's also a good idea to know your test results and keep a list of the medicines you take. How can you care for yourself at home? Medical treatment  · If you stop taking your medicine, your blood pressure will go back up. You may take one or more types of medicine to lower your blood pressure. Be safe with medicines. Take your medicine exactly as prescribed. Call your doctor if you think you are having a problem with your medicine. · Talk to your doctor before you start taking aspirin every day. Aspirin can help certain people lower their risk of a heart attack or stroke. But taking aspirin isn't right for everyone, because it can cause serious bleeding. · See your doctor regularly. You may need to see the doctor more often at first or until your blood pressure comes down. · If you are taking blood pressure medicine, talk to your doctor before you take decongestants or anti-inflammatory medicine, such as ibuprofen.  Some of these medicines can raise blood pressure. · Learn how to check your blood pressure at home. Lifestyle changes  · Stay at a healthy weight. This is especially important if you put on weight around the waist. Losing even 10 pounds can help you lower your blood pressure. · If your doctor recommends it, get more exercise. Walking is a good choice. Bit by bit, increase the amount you walk every day. Try for at least 30 minutes on most days of the week. You also may want to swim, bike, or do other activities. · Avoid or limit alcohol. Talk to your doctor about whether you can drink any alcohol. · Try to limit how much sodium you eat to less than 2,300 milligrams (mg) a day. Your doctor may ask you to try to eat less than 1,500 mg a day. · Eat plenty of fruits (such as bananas and oranges), vegetables, legumes, whole grains, and low-fat dairy products. · Lower the amount of saturated fat in your diet. Saturated fat is found in animal products such as milk, cheese, and meat. Limiting these foods may help you lose weight and also lower your risk for heart disease. · Do not smoke. Smoking increases your risk for heart attack and stroke. If you need help quitting, talk to your doctor about stop-smoking programs and medicines. These can increase your chances of quitting for good. When should you call for help? Call  911 anytime you think you may need emergency care. This may mean having symptoms that suggest that your blood pressure is causing a serious heart or blood vessel problem. Your blood pressure may be over 180/120. For example, call 911 if:    · You have symptoms of a heart attack. These may include:  ? Chest pain or pressure, or a strange feeling in the chest.  ? Sweating. ? Shortness of breath. ? Nausea or vomiting. ? Pain, pressure, or a strange feeling in the back, neck, jaw, or upper belly or in one or both shoulders or arms. ? Lightheadedness or sudden weakness.   ? A fast or irregular heartbeat.     · You have symptoms of a stroke. These may include:  ? Sudden numbness, tingling, weakness, or loss of movement in your face, arm, or leg, especially on only one side of your body. ? Sudden vision changes. ? Sudden trouble speaking. ? Sudden confusion or trouble understanding simple statements. ? Sudden problems with walking or balance. ? A sudden, severe headache that is different from past headaches.     · You have severe back or belly pain. Do not wait until your blood pressure comes down on its own. Get help right away. Call your doctor now or seek immediate care if:    · Your blood pressure is much higher than normal (such as 180/120 or higher), but you don't have symptoms.     · You think high blood pressure is causing symptoms, such as:  ? Severe headache.  ? Blurry vision. Watch closely for changes in your health, and be sure to contact your doctor if:    · Your blood pressure measures higher than your doctor recommends at least 2 times. That means the top number is higher or the bottom number is higher, or both.     · You think you may be having side effects from your blood pressure medicine. Where can you learn more? Go to http://www.gray.com/  Enter [id number] in the search box to learn more about \"High Blood Pressure: Care Instructions. \"  Current as of: August 31, 2020               Content Version: 12.8  © 2006-2021 [employer]. Care instructions adapted under license by [employer] (which disclaims liability or warranty for this information). If you have questions about a medical condition or this instruction, always ask your healthcare professional. [place] [address] any warranty or liability for your use of this information.

## 2021-08-27 NOTE — PROGRESS NOTES
Patient is in the office today for a 3 month follow up. Do you have an Advance Directive no  Do you want more information : information given     1. Have you been to the ER, urgent care clinic since your last visit? Hospitalized since your last visit? No    2. Have you seen or consulted any other health care providers outside of the [address] [place] [address] since your last visit? Include any pap smears or colon screening. yes, Dr. Manju Webster, and [facility]. Patient also sees Dr. Anuj Malcolm.

## 2021-08-27 NOTE — PROGRESS NOTES
Subjective:       Chief Complaint  The patient presents for follow up of diabetes, hypertension and high cholesterol. JOAO Robertson is a 76 y.o. male seen for follow up of diabetes. Rosa has hypertension and hyperlipidemia. Diabetes uncontrolled, on Lantus insulin 40 units daily and Humalog 15 units BID with meals, pt is followed by Endocrine, Dr Lorna Jain office,  hypertension well controlled,  on lisinopril 40 mg and coreg 25 mg BD, Plendil 5 mg and HCTZ 25 mg daily, patient does have a history of chronic kidney disease for which he is seeing nephrology, hyperlipidemia no significant medication side effects noted, well controlled, on Lipitor 40 mg. . He has h/o CVA. Diet and Lifestyle: generally follows a low fat low cholesterol diet, follows a diabetic diet regularly, exercises sporadically    Home BP Monitoring: is controlled at home,    Diabetic Review of Systems - home glucose monitoring: is performed regularly, 3x/day. Other symptoms and concerns: Patient has a history of asbestosis exposure. Consider chest x-ray follow-up in the near future. Discussed the patient's BMI with him. The BMI follow up plan is as follows: I have counseled this patient on diet and exercise regimens. [place].  It has been over 5 years. Cologuard done June 2021 was negative. At age 76 no further testing needed unless patient has symptoms. Patient has a history of asthma and is doing fairly well on Advair. Patient has a history of TIAs in the past and has a completely occluded left carotid artery. This was noted in 2015. Patient to continue on aspirin 81 mg daily and statin. Patient continues on vitamin D 5000 units/day for his vitamin D deficiency  Current Outpatient Medications   Medication Sig    flash glucose scanning reader ([employer] 14 Day [place]) misc by Does Not Apply route.     atorvastatin (LIPITOR) 40 mg tablet TAKE 1 TABLET DAILY    lisinopriL (PRINIVIL, ZESTRIL) 40 mg tablet Take 1 Tablet by mouth daily.  fluticasone propion-salmeteroL (ADVAIR/WIXELA) 250-50 mcg/dose diskus inhaler Take 1 Puff by inhalation every twelve (12) hours.  carvediloL (Coreg) 25 mg tablet Take 1 Tab by mouth two (2) times a day.  albuterol (PROVENTIL HFA, VENTOLIN HFA, PROAIR HFA) 90 mcg/actuation inhaler INHALE 2 PUFFS BY MOUTH EVERY 4 HOURS AS NEEDED FOR WHEEZING    lansoprazole (Prevacid) 30 mg capsule Take 1 Cap by mouth Daily (before breakfast).  hydroCHLOROthiazide (HYDRODIURIL) 25 mg tablet Take 1 Tab by mouth daily.  hydroCHLOROthiazide (HYDRODIURIL) 25 mg tablet Take 1 Tab by mouth daily.  felodipine (PLENDIL SR) 5 mg 24 hr tablet Take 1 Tab by mouth daily.  insulin lispro (HUMALOG) 100 unit/mL kwikpen 15 units BID with meals    aspirin delayed-release 325 mg tablet 325 mg.    glucose blood VI test strips (ONETOUCH ULTRA TEST) strip 50 Each.  insulin glargine (LANTUS SOLOSTAR) 100 unit/mL (3 mL) pen Lantus 24 Units SC daily    glucose blood VI test strips (ASCENSIA CONTOUR) strip     Insulin Needles, Disposable, (AUBREY PEN NEEDLE) 32 x 5/32 \" ndle 1 each.  Lancets misc 1 each.  multivitamins-minerals-lutein (CENTRUM SILVER) Tab Take  by mouth.  sildenafil citrate (VIAGRA) 100 mg tablet Take 1 Tab by mouth as needed. (Patient not taking: Reported on 8/27/2021)     No current facility-administered medications for this visit.              Review of Systems  Respiratory: negative for dyspnea on exertion  Cardiovascular: negative for chest pain    Objective:     Visit Vitals  /62 (BP 1 Location: Left arm, BP Patient Position: Sitting, BP Cuff Size: Adult)   Pulse 62   Temp 98.7 °F (37.1 °C) (Temporal)   Resp 16   Ht 5' 7\" (1.702 m)   Wt 209 lb (94.8 kg)   SpO2 99%   BMI 32.73 kg/m²        Chest - clear to auscultation, no wheezes, rales or rhonchi, symmetric air entry  Heart - normal rate and regular rhythm, regular rhythm, systolic murmur 2/6 at 2nd left intercostal space  Extremities - pedal edema 1 +      Labs:   Lab Results   Component Value Date/Time    Cholesterol, total 148 08/20/2021 11:13 AM    HDL Cholesterol 55 08/20/2021 11:13 AM    LDL, calculated 76 08/20/2021 11:13 AM    LDL, calculated 70 01/14/2021 10:12 AM    Triglyceride 94 08/20/2021 11:13 AM    CHOL/HDL Ratio 2.5 01/14/2021 10:12 AM     Lab Results   Component Value Date/Time    Prostate Specific Ag 0.8 10/06/2020 09:10 AM    Prostate Specific Ag 0.8 07/06/2016 09:45 AM    Prostate Specific Ag 1.4 12/30/2015 10:30 AM     Lab Results   Component Value Date/Time    Sodium 139 08/20/2021 11:13 AM    Potassium 5.0 08/20/2021 11:13 AM    Chloride 106 08/20/2021 11:13 AM    CO2 22 08/20/2021 11:13 AM    Anion gap 6 01/14/2021 10:12 AM    Glucose 185 (H) 08/20/2021 11:13 AM    BUN 32 (H) 08/20/2021 11:13 AM    Creatinine 1.54 (H) 08/20/2021 11:13 AM    BUN/Creatinine ratio 21 08/20/2021 11:13 AM    GFR est AA 50 (L) 08/20/2021 11:13 AM    GFR est non-AA 43 (L) 08/20/2021 11:13 AM    Calcium 9.7 08/20/2021 11:13 AM    Bilirubin, total 0.3 08/20/2021 11:13 AM    ALT (SGPT) 23 08/20/2021 11:13 AM    Alk. phosphatase 64 08/20/2021 11:13 AM    Protein, total 6.7 08/20/2021 11:13 AM    Albumin 4.0 08/20/2021 11:13 AM    Globulin 3.3 01/14/2021 10:12 AM    A-G Ratio 1.5 08/20/2021 11:13 AM      Lab Results   Component Value Date/Time    Hemoglobin A1c 9.3 (H) 08/20/2021 11:13 AM            Assessment / Plan     Diabetes uncontrolled, on Lantus 40 units daily and Humalog 12 units 2 times daily with meals. Patient being followed by endocrine. We will see how he does with adjustments by their office. .  Patient to continue to work on diet. He will try cutting back on snacks and losing about 10 pounds. Hypertension well controlled on Coreg and lisinopril and Plendil and  HCTZ 25 mg daily. Hyperlipidemia well controlled, on Lipitor 40 mg.       ICD-10-CM ICD-9-CM 1. 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 Essential hypertension  A78 340.3 METABOLIC PANEL, COMPREHENSIVE   3. Mixed hyperlipidemia  E78.2 272.2 LIPID PANEL   4. PVD (peripheral vascular disease) ([facility])  I73.9 443. 9  patient with history of left carotid artery complete occlusion and TIAs. Continue aspirin and statin   5. Asbestosis [facility])  Lilia Bowser [address]  patient currently stable consider follow-up chest x-ray   6. Prostate cancer screening  Z12.5 V76.44 PSA SCREENING (SCREENING)            Diabetic issues reviewed with him: diabetic diet discussed in detail, and low cholesterol diet, weight control and daily exercise discussed. Follow-up and Dispositions    · Return in about 3 months (around 11/27/2021) for labs 1 week before. Reviewed plan of care. Patient has provided input and agrees with goals.

## 2021-10-25 ENCOUNTER — TELEPHONE (OUTPATIENT)
Dept: INTERNAL MEDICINE CLINIC | Age: 76
End: 2021-10-25

## 2021-10-25 NOTE — TELEPHONE ENCOUNTER
Pt calling asking for appt with Dr. Alem Concepcion ASAP to discuss surgery his specialist is requesting. Unable to understand name of surgeon but patient is saying it is on his head.

## 2021-10-26 ENCOUNTER — TELEPHONE (OUTPATIENT)
Dept: INTERNAL MEDICINE CLINIC | Age: 76
End: 2021-10-26

## 2021-10-26 NOTE — TELEPHONE ENCOUNTER
Patient is aware the only BP medications he should be taking are valsartan 320 mg, HCTZ 25 mg, and Coreg 25 mg BID. Patient states he does not have valsartan 320 mg, and would like to have sent to the pharmacy.

## 2021-10-26 NOTE — TELEPHONE ENCOUNTER
Daughter in law called and made appointment for 9:45am tomorrow. Aware to bring bp readings and cuff to appointment.

## 2021-10-26 NOTE — TELEPHONE ENCOUNTER
According to his discharge record he should be on valsartan 320 mg, HCTZ 25 mg and coreg 25 mg BID.    Lisinopril 40 mg was discontinued

## 2021-10-26 NOTE — TELEPHONE ENCOUNTER
Patient states he had surgery last Friday and when he took Lisinopril it took his BP to low. Patient states his BP right now is 148/61. Patient wants to know if he should continue taking BP medication.

## 2021-10-26 NOTE — TELEPHONE ENCOUNTER
Would have pt bring in all his medications and continue to monitor his BP and bring in readings. Ok to schedule tomorrow Wednesday for Hospital Follow up. Ok to double book. Will adjust medications at that time.

## 2021-10-27 ENCOUNTER — OFFICE VISIT (OUTPATIENT)
Dept: INTERNAL MEDICINE CLINIC | Age: 76
End: 2021-10-27
Payer: COMMERCIAL

## 2021-10-27 VITALS
HEIGHT: 67 IN | BODY MASS INDEX: 32.49 KG/M2 | DIASTOLIC BLOOD PRESSURE: 70 MMHG | SYSTOLIC BLOOD PRESSURE: 166 MMHG | TEMPERATURE: 97.9 F | WEIGHT: 207 LBS | RESPIRATION RATE: 20 BRPM | HEART RATE: 64 BPM | OXYGEN SATURATION: 99 %

## 2021-10-27 DIAGNOSIS — N18.32 TYPE 2 DIABETES MELLITUS WITH STAGE 3B CHRONIC KIDNEY DISEASE, WITH LONG-TERM CURRENT USE OF INSULIN (HCC): ICD-10-CM

## 2021-10-27 DIAGNOSIS — I25.10 CORONARY ARTERY DISEASE INVOLVING NATIVE HEART WITHOUT ANGINA PECTORIS, UNSPECIFIED VESSEL OR LESION TYPE: ICD-10-CM

## 2021-10-27 DIAGNOSIS — I10 ESSENTIAL HYPERTENSION: Primary | ICD-10-CM

## 2021-10-27 DIAGNOSIS — Z79.4 TYPE 2 DIABETES MELLITUS WITH STAGE 3B CHRONIC KIDNEY DISEASE, WITH LONG-TERM CURRENT USE OF INSULIN (HCC): ICD-10-CM

## 2021-10-27 DIAGNOSIS — I63.81 LEFT SIDED LACUNAR INFARCTION (HCC): ICD-10-CM

## 2021-10-27 DIAGNOSIS — E78.2 MIXED HYPERLIPIDEMIA: ICD-10-CM

## 2021-10-27 DIAGNOSIS — Z09 HOSPITAL DISCHARGE FOLLOW-UP: ICD-10-CM

## 2021-10-27 DIAGNOSIS — E11.22 TYPE 2 DIABETES MELLITUS WITH STAGE 3B CHRONIC KIDNEY DISEASE, WITH LONG-TERM CURRENT USE OF INSULIN (HCC): ICD-10-CM

## 2021-10-27 PROCEDURE — 1111F DSCHRG MED/CURRENT MED MERGE: CPT | Performed by: INTERNAL MEDICINE

## 2021-10-27 PROCEDURE — 99214 OFFICE O/P EST MOD 30 MIN: CPT | Performed by: INTERNAL MEDICINE

## 2021-10-27 RX ORDER — HYDROCHLOROTHIAZIDE 12.5 MG/1
12.5 CAPSULE ORAL
COMMUNITY
Start: 2021-10-22 | End: 2021-11-04 | Stop reason: SDUPTHER

## 2021-10-27 RX ORDER — VALSARTAN 320 MG/1
320 TABLET ORAL DAILY
COMMUNITY
Start: 2021-10-22 | End: 2021-10-28 | Stop reason: SINTOL

## 2021-10-27 RX ORDER — CLOPIDOGREL BISULFATE 75 MG/1
75 TABLET ORAL DAILY
COMMUNITY
Start: 2021-10-22

## 2021-10-27 NOTE — PROGRESS NOTES
Patient is in the office today for a hospital  follow up. Patient needs clarification on which BP meds he should be taking. Patient states he needs a referral to Cardiology. Do you have an Advance Directive no  Do you want more information : information given     1. Have you been to the ER, urgent care clinic since your last visit? Hospitalized since your last visit? yes, [facility].    2. Have you seen or consulted any other health care providers outside of the [address] [place] [address] since your last visit? Include any pap smears or colon screening. yes, Nephrology.

## 2021-10-27 NOTE — PROGRESS NOTES
Transitional Care Management Progress Note    Patient: Seda Zavaleta  :   PCP: Barry Stahl MD    Date of admission: 10/14  Date of discharge: 10/22    Patient was contacted by Transitional Care Management services within two days after his discharge: No. This encounter and supporting documentation was reviewed if available. Medication reconciliation was performed today (10/27/2021). Assessment/Plan:   Diagnoses and all orders for this visit:    1. Essential hypertension    2. Type 2 diabetes mellitus with stage 3b chronic kidney disease, with long-term current use of insulin ([facility] [address]    3. Mixed hyperlipidemia    4. Coronary artery disease involving native heart without angina pectoris, unspecified vessel or lesion type  -     REFERRAL TO CARDIOLOGY    5. Left sided lacunar infarction ([facility] [address]    6. Hospital discharge follow-up  -     DC DISCHARGE MEDS RECONCILED W/ CURRENT OUTPATIENT MED LIST         Subjective:   Seda Zavaleta is a 76 y.o. male presenting today for follow-up after being discharged from [facility].  The discharge summary was reviewed or requested. The main problem requiring admission was left arm weakness. Complications during admission: none    Interval history/Current status: Patient presented to emergency room with left arm weakness which resolved. Evaluation showed occluded left internal carotid artery and right internal carotid artery with at least 80% stenosis and patient underwent carotid endarterectomy on 10/20/2021 without any complications. Patient is using A & D ointment. Patient is high blood pressure is uncontrolled in the office today. Patient is completely confused about his blood pressure medicines. In the hospital lisinopril and Plendil were discontinued and patient's HCTZ was decreased from 25 to 12.5 mg. He was in addition placed on Diovan 320 mg daily, Norvasc 5 mg and continued on Coreg 25 mg twice daily.   Patient never picked up his prescriptions at the pharmacy which also includes Plavix 75 mg which he is to take with aspirin 81 mg. Advised patient to get his medications from the pharmacy and we will see him back in the office with all his medications so we can make sure he is on the correct regimen. References diabetes is followed by endocrine and was on Lantus 50 units nightly which was decreased to 38 units. He continues on Humalog sliding scale. Due to the severe burden of atherosclerosis in patient's carotids it was recommended patient see cardiology as an outpatient for nuclear stress test.    Admitting symptoms have: improved      Medications marked \"taking\" at this time:  Home Medications    Medication Sig Start Date End Date Taking? Authorizing Provider   flash glucose scanning reader ([employer] Jord[employer] 14 Day [place]) misc by Does Not Apply route. Yes Provider, Historical   atorvastatin (LIPITOR) 40 mg tablet TAKE 1 TABLET DAILY  Patient taking differently: 20 mg. 8/24/21  Yes Sb Lane MD   fluticasone propion-salmeteroL (ADVAIR/WIXELA) 250-50 mcg/dose diskus inhaler Take 1 Puff by inhalation every twelve (12) hours. 5/27/21  Yes Sb Lane MD   carvediloL (Coreg) 25 mg tablet Take 1 Tab by mouth two (2) times a day. 4/22/21  Yes Hunte, Edward Lesches, MD   albuterol (PROVENTIL HFA, VENTOLIN HFA, PROAIR HFA) 90 mcg/actuation inhaler INHALE 2 PUFFS BY MOUTH EVERY 4 HOURS AS NEEDED FOR WHEEZING 4/4/21  Yes Hunte, Edward Lesches, MD   lansoprazole (Prevacid) 30 mg capsule Take 1 Cap by mouth Daily (before breakfast).  2/17/21  Yes Sb Lane MD   insulin lispro (HUMALOG) 100 unit/mL kwikpen 15 units BID with meals 6/8/20  Yes Hunte, Edward Lesches, MD   aspirin delayed-release 325 mg tablet 81 mg. 6/8/15  Yes Provider, Historical   glucose blood VI test strips (ONETOUCH ULTRA TEST) strip 50 Each. 7/13/15  Yes Provider, Historical   insulin glargine (LANTUS SOLOSTAR) 100 unit/mL (3 mL) pen Lantus 24 Units SC daily 5/4/15  Yes Provider, Historical   glucose blood VI test strips (ASCENSIA CONTOUR) strip  8/4/14  Yes Provider, Historical   Insulin Needles, Disposable, (AUBREY PEN NEEDLE) 32 x 5/32 \" ndle 1 each. 10/24/14  Yes Provider, Historical   Lancets misc 1 each. 7/21/14  Yes Provider, Historical   multivitamins-minerals-lutein (CENTRUM SILVER) Tab Take  by mouth. Yes Provider, Historical   sildenafil citrate (VIAGRA) 100 mg tablet Take 1 Tab by mouth as needed. 6/22/11  Yes Shoaib Rodas MD   valsartan (DIOVAN) 320 mg tablet Take 320 mg by mouth daily. 10/22/21   Provider, Historical   clopidogreL (PLAVIX) 75 mg tab Take 75 mg by mouth daily. 10/22/21   Provider, Historical   hydroCHLOROthiazide (MICROZIDE) 12.5 mg capsule Take 12.5 mg by mouth.  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    Impotence of organic origin N52.9    Diabetes mellitus ([facility] E11.9    Hypogonadism male E29.1    DDD (degenerative disc disease), cervical M50.30    Cervical neuritis M54.12    Cervical spondylosis M47.812    Asbestosis ([facility] [facility] J61    Bilateral carotid artery stenosis I65.23    Cerebral vascular disease I67.9    CKD (chronic kidney disease) stage 3, GFR 30-59 ml/min ([facility]) N18.30    Diabetic polyneuropathy associated with type 2 diabetes mellitus ([facility]) E11.42    Essential hypertension I10    GERD (gastroesophageal reflux disease) K21.9    Hx of transient ischemic attack (TIA) Z86.73    Mixed hyperlipidemia E78.2    Non morbid obesity due to excess calories E66.09    Occlusion of left carotid artery I65.22    Primary osteoarthritis of left hip M16.12    Sensory deficit present R44.9    Type 2 DM with CKD stage 3 and hypertension ([facility]) E11.22, I12.9, N18.30    Type 2 diabetes mellitus with diabetic nephropathy, with long-term current use of insulin ([facility]) E11.21, Z79.4          Objective:   BP (!) 166/70   Pulse 64   Temp 97.9 °F (36.6 °C) (Temporal)   Resp 20   Ht 5' 7\" (1.702 m)   Wt 207 lb (93.9 kg)   SpO2 99%   BMI 32.42 kg/m²      Physical Examination: Neck - Healing surgical wound right neck that is clean dry and intact  Chest - clear to auscultation, no wheezes, rales or rhonchi, symmetric air entry  Heart - normal rate, regular rhythm, normal S1, S2, no murmurs, rubs, clicks or gallops  Extremities - peripheral pulses normal, no pedal edema, no clubbing or cyanosis      We discussed the expected course, resolution and complications of the diagnosis(es) in detail. Medication risks, benefits, costs, interactions, and alternatives were discussed as indicated. I advised him to contact the office if his condition worsens, changes or fails to improve as anticipated. He expressed understanding with the diagnosis(es) and plan. Follow-up and Dispositions    · Return in about 1 week (around 11/3/2021) for BP check.          Magan Kramer MD

## 2021-10-28 ENCOUNTER — TELEPHONE (OUTPATIENT)
Dept: INTERNAL MEDICINE CLINIC | Age: 76
End: 2021-10-28

## 2021-10-28 RX ORDER — LISINOPRIL 40 MG/1
40 TABLET ORAL DAILY
Qty: 90 TABLET | Refills: 3 | COMMUNITY
Start: 2021-10-28 | End: 2022-01-11 | Stop reason: SDUPTHER

## 2021-10-28 NOTE — TELEPHONE ENCOUNTER
Ok to stay off both medications. Would continue lisinopril 40 mg along with Coreg and HCTZ until I see him in a week. Bring in all medications at [address] even if he is not taking them.

## 2021-10-28 NOTE — TELEPHONE ENCOUNTER
Pt stated he took two new medicines to him last night Valsartan and Clopidogrel and got \"sick as a dog\"    Cramping, throwing up and waking up in a sweat last night     He is aware not to take those two medication until he hears back from the office. He assured me he is not taking them again.

## 2021-10-28 NOTE — TELEPHONE ENCOUNTER
Patient made aware pf message per Dr. Daya Sands and verbalized understanding with no further questions.

## 2021-10-29 ENCOUNTER — TELEPHONE (OUTPATIENT)
Dept: INTERNAL MEDICINE CLINIC | Age: 76
End: 2021-10-29

## 2021-10-29 NOTE — TELEPHONE ENCOUNTER
Pt called to let Dr. Tha Benitez know that his left arm is still tingling. Pt stated he's still taking lisinopriL and he does not know what to do about his arm. For more information he can be reached at [phone]. Please advise.  Thank you!!!

## 2021-10-29 NOTE — TELEPHONE ENCOUNTER
Left detailed message for patient to continue to monitor BP and call office with readings on Monday. Dr. Micky Jimenez wants to see if the tingling improves with his BP getting better. Will reevaluate tingling next week if he still has it at [facility].

## 2021-10-29 NOTE — TELEPHONE ENCOUNTER
Continue to monitor BP and call us with readings on Monday. Want to see if the tingling in his arm improves as his BP gets better.  Will revaluate tingling next week if he still has it at [address] [facility]

## 2021-11-04 ENCOUNTER — OFFICE VISIT (OUTPATIENT)
Dept: INTERNAL MEDICINE CLINIC | Age: 76
End: 2021-11-04
Payer: COMMERCIAL

## 2021-11-04 VITALS
WEIGHT: 200 LBS | BODY MASS INDEX: 31.39 KG/M2 | TEMPERATURE: 97.2 F | HEIGHT: 67 IN | HEART RATE: 75 BPM | DIASTOLIC BLOOD PRESSURE: 70 MMHG | SYSTOLIC BLOOD PRESSURE: 148 MMHG | RESPIRATION RATE: 20 BRPM | OXYGEN SATURATION: 99 %

## 2021-11-04 DIAGNOSIS — E11.22 TYPE 2 DIABETES MELLITUS WITH STAGE 3B CHRONIC KIDNEY DISEASE, WITH LONG-TERM CURRENT USE OF INSULIN (HCC): ICD-10-CM

## 2021-11-04 DIAGNOSIS — E78.2 MIXED HYPERLIPIDEMIA: ICD-10-CM

## 2021-11-04 DIAGNOSIS — I73.9 PVD (PERIPHERAL VASCULAR DISEASE) (HCC): ICD-10-CM

## 2021-11-04 DIAGNOSIS — I10 ESSENTIAL HYPERTENSION: Primary | ICD-10-CM

## 2021-11-04 DIAGNOSIS — Z79.4 TYPE 2 DIABETES MELLITUS WITH STAGE 3B CHRONIC KIDNEY DISEASE, WITH LONG-TERM CURRENT USE OF INSULIN (HCC): ICD-10-CM

## 2021-11-04 DIAGNOSIS — I25.10 CORONARY ARTERY DISEASE INVOLVING NATIVE HEART WITHOUT ANGINA PECTORIS, UNSPECIFIED VESSEL OR LESION TYPE: ICD-10-CM

## 2021-11-04 DIAGNOSIS — N18.32 TYPE 2 DIABETES MELLITUS WITH STAGE 3B CHRONIC KIDNEY DISEASE, WITH LONG-TERM CURRENT USE OF INSULIN (HCC): ICD-10-CM

## 2021-11-04 PROCEDURE — 99214 OFFICE O/P EST MOD 30 MIN: CPT | Performed by: INTERNAL MEDICINE

## 2021-11-04 RX ORDER — HYDROCHLOROTHIAZIDE 12.5 MG/1
12.5 CAPSULE ORAL DAILY
Qty: 90 CAPSULE | Refills: 3 | Status: SHIPPED | OUTPATIENT
Start: 2021-11-04 | End: 2022-10-07 | Stop reason: SDUPTHER

## 2021-11-04 RX ORDER — FLUTICASONE PROPIONATE AND SALMETEROL 250; 50 UG/1; UG/1
1 POWDER RESPIRATORY (INHALATION) EVERY 12 HOURS
Qty: 3 EACH | Refills: 3 | Status: SHIPPED | OUTPATIENT
Start: 2021-11-04

## 2021-11-04 RX ORDER — FELODIPINE 5 MG/1
5 TABLET, EXTENDED RELEASE ORAL DAILY
Qty: 90 TABLET | Refills: 3 | COMMUNITY
Start: 2021-11-04 | End: 2022-02-14 | Stop reason: ALTCHOICE

## 2021-11-04 NOTE — PROGRESS NOTES
Patient is in the office today for a 1 week follow up. 1. Have you been to the ER, urgent care clinic since your last visit? Hospitalized since your last visit? No    2. Have you seen or consulted any other health care providers outside of the [address] [place] [address] since your last visit? Include any pap smears or colon screening.  No

## 2021-11-08 NOTE — PROGRESS NOTES
Hiral Sheffield is a 76 y.o.  male and presents with Blood Pressure Check (f/u), Hypertension (=), Vitamin D Deficiency, Coronary Artery Disease, and Diabetes      SUBJECTIVE:    Patient with his grandson here in the office today. Still has some confusion about his blood pressure medications. He did not feel well when he took Diovan 320 mg so he does not want to take this medication anymore. We will therefore continue on Coreg 25 mg twice daily, HCTZ 12.5 mg daily, Plendil 5 mg daily and will try lisinopril 20 mg daily since the higher dose gave patient some dizziness and hypotension. Patient continues Lipitor 40 mg for his high cholesterol. He will continue on Plavix 75 mg daily and aspirin 81 mg daily. For his diabetes he continues on Lantus 38 units daily with Humalog per sliding scale which is managed by his endocrinologist.  Patient has a history of asthma and has some mild wheezing and is taking his albuterol daily about 2 times. Try to educate him to take his Advair on a regular basis but patient had some difficulty comprehending this. Have advised him to bring all his medications in at next office visit except for insulin so we can confirm that he is taking the right medications. As mentioned before his grandson is with him and will try to help him sort out his medication. Due to recent history of stroke and severe carotid artery disease which required carotid endarterectomy was recommended that patient see cardiology for evaluation of his coronary arteries. We will go ahead and refer him to cardiologist today. Respiratory ROS: negative for - shortness of breath  Cardiovascular ROS: negative for - chest pain    Current Outpatient Medications   Medication Sig    OTHER Prevagen 1 tab daily    cholecalciferol, vitamin D3, (VITAMIN D3 PO) Take  by mouth.  hydroCHLOROthiazide (MICROZIDE) 12.5 mg capsule Take 1 Capsule by mouth daily.     fluticasone propion-salmeteroL (ADVAIR/WIXELA) 250-50 mcg/dose diskus inhaler Take 1 Puff by inhalation every twelve (12) hours.  felodipine (PLENDIL SR) 5 mg 24 hr tablet Take 1 Tablet by mouth daily.  lisinopriL (PRINIVIL, ZESTRIL) 40 mg tablet Take 1 Tablet by mouth daily.  clopidogreL (PLAVIX) 75 mg tab Take 75 mg by mouth daily.  flash glucose scanning reader ([employer] Jordy 14 Day [place]) misc by Does Not Apply route.  atorvastatin (LIPITOR) 40 mg tablet TAKE 1 TABLET DAILY (Patient taking differently: 20 mg.)    carvediloL (Coreg) 25 mg tablet Take 1 Tab by mouth two (2) times a day.  albuterol (PROVENTIL HFA, VENTOLIN HFA, PROAIR HFA) 90 mcg/actuation inhaler INHALE 2 PUFFS BY MOUTH EVERY 4 HOURS AS NEEDED FOR WHEEZING    lansoprazole (Prevacid) 30 mg capsule Take 1 Cap by mouth Daily (before breakfast).  insulin lispro (HUMALOG) 100 unit/mL kwikpen 15 units BID with meals    aspirin delayed-release 325 mg tablet 81 mg.    glucose blood VI test strips (ONETOUCH ULTRA TEST) strip 50 Each.  insulin glargine (LANTUS SOLOSTAR) 100 unit/mL (3 mL) pen Lantus 24 Units SC daily    glucose blood VI test strips (ASCENSIA CONTOUR) strip     Insulin Needles, Disposable, (AUBREY PEN NEEDLE) 32 x 5/32 \" ndle 1 each.  Lancets misc 1 each.  multivitamins-minerals-lutein (CENTRUM SILVER) Tab Take  by mouth.  sildenafil citrate (VIAGRA) 100 mg tablet Take 1 Tab by mouth as needed. (Patient not taking: Reported on 11/4/2021)     No current facility-administered medications for this visit.          OBJECTIVE:  alert, well appearing, and in no distress  Visit Vitals  BP (!) 148/70 (BP 1 Location: Right arm, BP Patient Position: Standing, BP Cuff Size: Adult)   Pulse 75   Temp 97.2 °F (36.2 °C) (Temporal)   Resp 20   Ht 5' 7\" (1.702 m)   Wt 200 lb (90.7 kg)   SpO2 99%   BMI 31.32 kg/m²      well developed and well nourished  Chest - clear to auscultation, no wheezes, rales or rhonchi, symmetric air entry  Heart - normal rate, regular rhythm, normal S1, S2, no murmurs, rubs, clicks or gallops      Labs:   Lab Results   Component Value Date/Time    Cholesterol, total 148 08/20/2021 11:13 AM    HDL Cholesterol 55 08/20/2021 11:13 AM    LDL, calculated 76 08/20/2021 11:13 AM    LDL, calculated 70 01/14/2021 10:12 AM    Triglyceride 94 08/20/2021 11:13 AM    CHOL/HDL Ratio 2.5 01/14/2021 10:12 AM     Lab Results   Component Value Date/Time    Sodium 139 08/20/2021 11:13 AM    Potassium 5.0 08/20/2021 11:13 AM    Chloride 106 08/20/2021 11:13 AM    CO2 22 08/20/2021 11:13 AM    Anion gap 6 01/14/2021 10:12 AM    Glucose 185 (H) 08/20/2021 11:13 AM    BUN 32 (H) 08/20/2021 11:13 AM    Creatinine 1.54 (H) 08/20/2021 11:13 AM    BUN/Creatinine ratio 21 08/20/2021 11:13 AM    GFR est AA 50 (L) 08/20/2021 11:13 AM    GFR est non-AA 43 (L) 08/20/2021 11:13 AM    Calcium 9.7 08/20/2021 11:13 AM    Bilirubin, total 0.3 08/20/2021 11:13 AM    ALT (SGPT) 23 08/20/2021 11:13 AM    Alk. phosphatase 64 08/20/2021 11:13 AM    Protein, total 6.7 08/20/2021 11:13 AM    Albumin 4.0 08/20/2021 11:13 AM    Globulin 3.3 01/14/2021 10:12 AM    A-G Ratio 1.5 08/20/2021 11:13 AM      Lab Results   Component Value Date/Time    Hemoglobin A1c 9.3 (H) 08/20/2021 11:13 AM               Discussed the patient's BMI with him. The BMI follow up plan is as follows: I have counseled this patient on diet and exercise regimens. Assessment/Plan      ICD-10-CM ICD-9-CM    1. Essential hypertension  I10 401.9 REFERRAL TO CARDIOLOGY for evaluation for coronary artery disease due to recent stroke and severe peripheral vascular disease. Patient to continue on HCTZ 12.5 mg daily, Plendil 5mg daily, Coreg 25 mg twice daily and lisinopril 20 mg daily. Will increase back to 40 mg daily if needed.    2. Type 2 diabetes mellitus with stage 3b chronic kidney disease, with long-term current use of insulin (HCC)  E11.22 250.40  patient managed by his endocrinologist and continues on Lantus 38 units daily and Humalog per sliding scale insulin    N18.32 585.3     Z79.4 V58.67    3. Mixed hyperlipidemia  E78.2 272.2  patient continues on Lipitor 40 mg daily   4. PVD (peripheral vascular disease) (HCC)  I73.9 443. 9  patient status post right carotid endarterectomy and continues to recover. 5. Coronary artery disease involving native heart without angina pectoris, unspecified vessel or lesion type  I25.10 414.01  referred to cardiology for further elevation     Follow-up and Dispositions    · Return in about 2 weeks (around 11/18/2021) for BP check. Reviewed plan of care. Patient has provided input and agrees with goals.

## 2021-11-15 ENCOUNTER — TELEPHONE (OUTPATIENT)
Dept: INTERNAL MEDICINE CLINIC | Age: 76
End: 2021-11-15

## 2021-11-15 DIAGNOSIS — R19.7 DIARRHEA, UNSPECIFIED TYPE: Primary | ICD-10-CM

## 2021-11-15 RX ORDER — DIPHENOXYLATE HYDROCHLORIDE AND ATROPINE SULFATE 2.5; .025 MG/1; MG/1
1 TABLET ORAL
Qty: 30 TABLET | Refills: 0 | Status: SHIPPED | OUTPATIENT
Start: 2021-11-15

## 2021-11-15 NOTE — TELEPHONE ENCOUNTER
Spoke with Hazeline Bolus she states patient is having diarrhea and pain when he has BM. Patient states he has no control  of BM. Oscareline Bolus wanted to know if she could give him something for diarrhea.

## 2021-11-15 NOTE — TELEPHONE ENCOUNTER
Daughter in law calling, says on Friday patient couldn't go to the bathroom. Took him to [facility] and they sent him to [facility]. They gave him a cath. Today the patient is going uncontrollably. He took 2 pills. They aren't sure what he took but he is going to the bathroom all the time and can't control it. Wants to know what they should do?

## 2021-11-15 NOTE — TELEPHONE ENCOUNTER
Did they take out the eldridge or is it still in. If it is out and he is urinating a lot it is due to Flomax pills he was given. Can take it every other day or stop it if going too much and can restart Flomax if any urinary retention.

## 2021-11-18 ENCOUNTER — OFFICE VISIT (OUTPATIENT)
Dept: INTERNAL MEDICINE CLINIC | Age: 76
End: 2021-11-18
Payer: COMMERCIAL

## 2021-11-18 VITALS
DIASTOLIC BLOOD PRESSURE: 64 MMHG | TEMPERATURE: 97.2 F | HEIGHT: 67 IN | WEIGHT: 200 LBS | BODY MASS INDEX: 31.39 KG/M2 | HEART RATE: 76 BPM | OXYGEN SATURATION: 97 % | SYSTOLIC BLOOD PRESSURE: 121 MMHG | RESPIRATION RATE: 20 BRPM

## 2021-11-18 DIAGNOSIS — N40.1 BENIGN PROSTATIC HYPERPLASIA WITH URINARY RETENTION: ICD-10-CM

## 2021-11-18 DIAGNOSIS — I10 PRIMARY HYPERTENSION: Primary | ICD-10-CM

## 2021-11-18 DIAGNOSIS — R19.7 DIARRHEA, UNSPECIFIED TYPE: ICD-10-CM

## 2021-11-18 DIAGNOSIS — R33.8 BENIGN PROSTATIC HYPERPLASIA WITH URINARY RETENTION: ICD-10-CM

## 2021-11-18 PROCEDURE — 99213 OFFICE O/P EST LOW 20 MIN: CPT | Performed by: INTERNAL MEDICINE

## 2021-11-18 NOTE — PROGRESS NOTES
Patient is in the office today for a 2 week follow up. Do you have an Advance Directive no  Do you want more information : information given     1. \"Have you been to the ER, urgent care clinic since your last visit? Hospitalized since your last visit? \" yes. 2. \"Have you seen or consulted any other health care providers outside of the [address] [place] [address] since your last visit? \" No

## 2021-11-20 NOTE — PROGRESS NOTES
Negar Hoffmann. is a 68 y.o.  male and presents with Blood Pressure Check, Benign Prostatic Hypertrophy, and Diarrhea      SUBJECTIVE:    Patient comes in for follow-up of his high blood pressure which is well controlled on HCTZ 12.5 mg, lisinopril 1/2 tablet of 40 mg, Coreg 25 mg twice daily and Plendil 1/2 tab 5 mg. Patient was recently in the emergency room for urinary retention and had a Santoyo placed and was placed on Flomax but he says when he takes the medication at night it makes him extremely dizzy so he was advised to hold the Flomax for now. Patient is having problems with diarrhea for unclear reasons. He will continue to take probiotics and he can increase Lomotil to 2 tablets 4 times a day as needed to control the diarrhea. For his recent stroke he is just on aspirin. Patient supposed to be taking 81 mg of aspirin and Plavix 75 mg but for some reason he stopped the Plavix so he probably should would benefit from aspirin 325 mg daily. Patient again was asked to bring in all his medications even the ones that he had stopped so we can make sure he is taking all the medicines consistently. Patient has follow-up with vascular surgery for his recent right carotid endarterectomy and urology for the Santoyo that he had placed. Respiratory ROS: negative for - shortness of breath  Cardiovascular ROS: negative for - chest pain    Current Outpatient Medications   Medication Sig    diphenoxylate-atropine (LOMOTIL) 2.5-0.025 mg per tablet Take 1 Tablet by mouth four (4) times daily as needed for Diarrhea. Max Daily Amount: 4 Tablets.  OTHER Prevagen 1 tab daily    cholecalciferol, vitamin D3, (VITAMIN D3 PO) Take  by mouth.  hydroCHLOROthiazide (MICROZIDE) 12.5 mg capsule Take 1 Capsule by mouth daily.  fluticasone propion-salmeteroL (ADVAIR/WIXELA) 250-50 mcg/dose diskus inhaler Take 1 Puff by inhalation every twelve (12) hours.     felodipine (PLENDIL SR) 5 mg 24 hr tablet Take 1 Tablet by mouth daily.  lisinopriL (PRINIVIL, ZESTRIL) 40 mg tablet Take 1 Tablet by mouth daily.  flash glucose scanning reader ([employer] Jordy 14 Day [place]) misc by Does Not Apply route.  atorvastatin (LIPITOR) 40 mg tablet TAKE 1 TABLET DAILY (Patient taking differently: 20 mg.)    carvediloL (Coreg) 25 mg tablet Take 1 Tab by mouth two (2) times a day.  albuterol (PROVENTIL HFA, VENTOLIN HFA, PROAIR HFA) 90 mcg/actuation inhaler INHALE 2 PUFFS BY MOUTH EVERY 4 HOURS AS NEEDED FOR WHEEZING    lansoprazole (Prevacid) 30 mg capsule Take 1 Cap by mouth Daily (before breakfast).  insulin lispro (HUMALOG) 100 unit/mL kwikpen 15 units BID with meals    aspirin delayed-release 325 mg tablet 81 mg.    glucose blood VI test strips (ONETOUCH ULTRA TEST) strip 50 Each.  insulin glargine (LANTUS SOLOSTAR) 100 unit/mL (3 mL) pen Lantus 24 Units SC daily    glucose blood VI test strips ([place] CONTOUR) strip     Insulin Needles, Disposable, (AUBREY PEN NEEDLE) 32 x 5/32 \" ndle 1 each.  Lancets misc 1 each.  multivitamins-minerals-lutein (CENTRUM SILVER) Tab Take  by mouth.  clopidogreL (PLAVIX) 75 mg tab Take 75 mg by mouth daily. (Patient not taking: Reported on 11/18/2021)    sildenafil citrate (VIAGRA) 100 mg tablet Take 1 Tab by mouth as needed. (Patient not taking: Reported on 11/4/2021)     No current facility-administered medications for this visit.          OBJECTIVE:  alert, well appearing, and in no distress  Visit Vitals  /64   Pulse 76   Temp 97.2 °F (36.2 °C) (Temporal)   Resp 20   Ht 5' 7\" (1.702 m)   Wt 200 lb (90.7 kg)   SpO2 97%   BMI 31.32 kg/m²      well developed and well nourished  Chest - clear to auscultation, no wheezes, rales or rhonchi, symmetric air entry  Heart - normal rate, regular rhythm, normal S1, S2, no murmurs, rubs, clicks or gallops        Labs:   Lab Results   Component Value Date/Time    Sodium 139 08/20/2021 11:13 AM    Potassium 5.0 08/20/2021 11:13 AM Chloride 106 08/20/2021 11:13 AM    CO2 22 08/20/2021 11:13 AM    Anion gap 6 01/14/2021 10:12 AM    Glucose 185 (H) 08/20/2021 11:13 AM    BUN 32 (H) 08/20/2021 11:13 AM    Creatinine 1.54 (H) 08/20/2021 11:13 AM    BUN/Creatinine ratio 21 08/20/2021 11:13 AM    GFR est AA 50 (L) 08/20/2021 11:13 AM    GFR est non-AA 43 (L) 08/20/2021 11:13 AM    Calcium 9.7 08/20/2021 11:13 AM        Discussed the patient's BMI with him. The BMI follow up plan is as follows: I have counseled this patient on diet and exercise regimens. Assessment/Plan      ICD-10-CM ICD-9-CM    1. Primary hypertension  I10 401.9  hypertension controlled on Plendil 2.5 mg daily, lisinopril 20 mg daily, HCTZ 12.5 mg daily and Coreg 25 mg twice daily. 2. Benign prostatic hyperplasia with urinary retention  N40.1 600.01  patient currently has an indwelling Santoyo and will hold Flomax due to dizziness    R33.8 788.20    3. Diarrhea, unspecified type  R19.7 787.91  etiology unclear patient to increase Lomotil to 2 tablets 4 times a day if needed and continue to use probiotics     Follow-up and Dispositions    · Return in about 2 weeks (around 12/2/2021) for BP check, diarrhea. Reviewed plan of care. Patient has provided input and agrees with goals.

## 2021-12-28 ENCOUNTER — TELEPHONE (OUTPATIENT)
Dept: INTERNAL MEDICINE CLINIC | Age: 76
End: 2021-12-28

## 2021-12-28 DIAGNOSIS — N18.32 TYPE 2 DIABETES MELLITUS WITH STAGE 3B CHRONIC KIDNEY DISEASE, WITH LONG-TERM CURRENT USE OF INSULIN (HCC): ICD-10-CM

## 2021-12-28 DIAGNOSIS — D50.0 IRON DEFICIENCY ANEMIA DUE TO CHRONIC BLOOD LOSS: ICD-10-CM

## 2021-12-28 DIAGNOSIS — I10 PRIMARY HYPERTENSION: Primary | ICD-10-CM

## 2021-12-28 DIAGNOSIS — E78.2 MIXED HYPERLIPIDEMIA: ICD-10-CM

## 2021-12-28 DIAGNOSIS — E11.22 TYPE 2 DIABETES MELLITUS WITH STAGE 3B CHRONIC KIDNEY DISEASE, WITH LONG-TERM CURRENT USE OF INSULIN (HCC): ICD-10-CM

## 2021-12-28 DIAGNOSIS — Z79.4 TYPE 2 DIABETES MELLITUS WITH STAGE 3B CHRONIC KIDNEY DISEASE, WITH LONG-TERM CURRENT USE OF INSULIN (HCC): ICD-10-CM

## 2021-12-28 NOTE — TELEPHONE ENCOUNTER
----- Message from THE [facility] sent at 12/28/2021  8:32 AM EST -----  Subject: Message to Provider    QUESTIONS  Information for Provider? pt has appt scheduled for 1/7/22 has additional   questions regarding lab work  ---------------------------------------------------------------------------  --------------  [address]  What is the best way for the office to contact you? OK to leave message on   voicemail  Preferred Call Back Phone Number?  [phone]  ---------------------------------------------------------------------------  --------------  SCRIPT ANSWERS  undefined

## 2022-01-04 LAB
ALBUMIN SERPL-MCNC: 3.9 G/DL (ref 3.7–4.7)
ALBUMIN/CREAT UR: 599 MG/G CREAT (ref 0–29)
ALBUMIN/GLOB SERPL: 1.5 {RATIO} (ref 1.2–2.2)
ALP SERPL-CCNC: 85 IU/L (ref 44–121)
ALT SERPL-CCNC: 15 IU/L (ref 0–44)
AST SERPL-CCNC: 14 IU/L (ref 0–40)
BASOPHILS # BLD AUTO: 0.1 X10E3/UL (ref 0–0.2)
BASOPHILS NFR BLD AUTO: 1 %
BILIRUB SERPL-MCNC: <0.2 MG/DL (ref 0–1.2)
BUN SERPL-MCNC: 33 MG/DL (ref 8–27)
BUN/CREAT SERPL: 21 (ref 10–24)
CALCIUM SERPL-MCNC: 9.5 MG/DL (ref 8.6–10.2)
CHLORIDE SERPL-SCNC: 104 MMOL/L (ref 96–106)
CHOLEST SERPL-MCNC: 107 MG/DL (ref 100–199)
CO2 SERPL-SCNC: 20 MMOL/L (ref 20–29)
CREAT SERPL-MCNC: 1.54 MG/DL (ref 0.76–1.27)
CREAT UR-MCNC: 91.2 MG/DL
EOSINOPHIL # BLD AUTO: 0.1 X10E3/UL (ref 0–0.4)
EOSINOPHIL NFR BLD AUTO: 1 %
ERYTHROCYTE [DISTWIDTH] IN BLOOD BY AUTOMATED COUNT: 15.5 % (ref 11.6–15.4)
GLOBULIN SER CALC-MCNC: 2.6 G/DL (ref 1.5–4.5)
GLUCOSE SERPL-MCNC: 250 MG/DL (ref 65–99)
HBA1C MFR BLD: 9.2 % (ref 4.8–5.6)
HCT VFR BLD AUTO: 28 % (ref 37.5–51)
HDLC SERPL-MCNC: 43 MG/DL
HGB BLD-MCNC: 8.5 G/DL (ref 13–17.7)
IMM GRANULOCYTES # BLD AUTO: 0 X10E3/UL (ref 0–0.1)
IMM GRANULOCYTES NFR BLD AUTO: 0 %
IMP & REVIEW OF LAB RESULTS: NORMAL
INTERPRETATION: NORMAL
LDLC SERPL CALC-MCNC: 45 MG/DL (ref 0–99)
LYMPHOCYTES # BLD AUTO: 1.2 X10E3/UL (ref 0.7–3.1)
LYMPHOCYTES NFR BLD AUTO: 25 %
MCH RBC QN AUTO: 25.2 PG (ref 26.6–33)
MCHC RBC AUTO-ENTMCNC: 30.4 G/DL (ref 31.5–35.7)
MCV RBC AUTO: 83 FL (ref 79–97)
MICROALBUMIN UR-MCNC: 545.9 UG/ML
MONOCYTES # BLD AUTO: 0.4 X10E3/UL (ref 0.1–0.9)
MONOCYTES NFR BLD AUTO: 8 %
NEUTROPHILS # BLD AUTO: 3.1 X10E3/UL (ref 1.4–7)
NEUTROPHILS NFR BLD AUTO: 65 %
PLATELET # BLD AUTO: 313 X10E3/UL (ref 150–450)
POTASSIUM SERPL-SCNC: 4.4 MMOL/L (ref 3.5–5.2)
PROT SERPL-MCNC: 6.5 G/DL (ref 6–8.5)
RBC # BLD AUTO: 3.37 X10E6/UL (ref 4.14–5.8)
SODIUM SERPL-SCNC: 135 MMOL/L (ref 134–144)
TRIGL SERPL-MCNC: 102 MG/DL (ref 0–149)
VLDLC SERPL CALC-MCNC: 19 MG/DL (ref 5–40)
WBC # BLD AUTO: 4.9 X10E3/UL (ref 3.4–10.8)

## 2022-01-11 RX ORDER — LISINOPRIL 40 MG/1
40 TABLET ORAL DAILY
Qty: 90 TABLET | Refills: 3 | Status: SHIPPED | OUTPATIENT
Start: 2022-01-11 | End: 2022-01-27

## 2022-01-11 RX ORDER — ALBUTEROL SULFATE 90 UG/1
2 AEROSOL, METERED RESPIRATORY (INHALATION)
Qty: 3 EACH | Refills: 3 | Status: SHIPPED | OUTPATIENT
Start: 2022-01-11 | End: 2022-09-21 | Stop reason: SDUPTHER

## 2022-01-11 NOTE — TELEPHONE ENCOUNTER
Last Visit: 11/18/21 with MD Abdi  Next Appointment: 1/28/22 with MD Abdi    Requested Prescriptions     Pending Prescriptions Disp Refills    albuterol (PROVENTIL HFA, VENTOLIN HFA, PROAIR HFA) 90 mcg/actuation inhaler 3 Each 3     Sig: Take 2 Puffs by inhalation every four (4) hours as needed for Wheezing.  lisinopriL (PRINIVIL, ZESTRIL) 40 mg tablet 90 Tablet 3     Sig: Take 1 Tablet by mouth daily.

## 2022-01-14 ENCOUNTER — OFFICE VISIT (OUTPATIENT)
Dept: CARDIOLOGY CLINIC | Age: 77
End: 2022-01-14
Payer: COMMERCIAL

## 2022-01-14 VITALS
WEIGHT: 196 LBS | OXYGEN SATURATION: 98 % | SYSTOLIC BLOOD PRESSURE: 164 MMHG | BODY MASS INDEX: 30.76 KG/M2 | HEART RATE: 77 BPM | DIASTOLIC BLOOD PRESSURE: 92 MMHG | HEIGHT: 67 IN

## 2022-01-14 DIAGNOSIS — I67.9 CEREBRAL VASCULAR DISEASE: ICD-10-CM

## 2022-01-14 DIAGNOSIS — R06.02 SOB (SHORTNESS OF BREATH): ICD-10-CM

## 2022-01-14 DIAGNOSIS — I65.23 BILATERAL CAROTID ARTERY STENOSIS: Primary | ICD-10-CM

## 2022-01-14 DIAGNOSIS — I10 ESSENTIAL HYPERTENSION: ICD-10-CM

## 2022-01-14 PROCEDURE — 93000 ELECTROCARDIOGRAM COMPLETE: CPT | Performed by: INTERNAL MEDICINE

## 2022-01-14 PROCEDURE — 99204 OFFICE O/P NEW MOD 45 MIN: CPT | Performed by: INTERNAL MEDICINE

## 2022-01-14 NOTE — PROGRESS NOTES
HISTORY OF PRESENT ILLNESS  Jerry Perea is a 68 y.o. male. ASSESSMENT and PLAN    Mr. Eliud Elias has no documented history of CAD. However, he does have documented history of ICA disease. Around October 2021, he presented with left hand numbness to [facility]. He was then noted to have occluded left ICA and severely diseased right ICA. He had right CEA performed during that hospitalization. He also has a longstanding history. He denies previous history of tobacco use. He has history of IDDM. He has history of dyslipidemia on Lipitor. From cardiac standpoint, he has no complaints of chest pains or worsening dyspnea on exertion. His blood pressure is elevated. It is 164/92. He states that he did not take his blood pressure medications today because of his doctors visits. He does keep blood pressure diary at home. He measures his blood pressure at home at least once a day often twice a day. His rhythm remained stable sinus. There is no evidence of decompensated CHF noted. His weight today is 196 pounds. He denies previous use of tobacco.  His target LDL is less than 70. He remains on aspirin and Plavix. I have asked him to come back in 1 month to see my nurse practitioner with his BP diary and his home blood pressure machines. We will correlate his machines with our office. If he remains hypertensive, I would add clonidine 0.1 mg daily. With his numerous coronary risk factors and known recent history of right CEA, I have recommended proceeding on with an echocardiogram as well as pharmacologic nuclear scan. He is not able to ambulate; therefore, regular stress test cannot be done. If the above testing is unremarkable, I will see him back in 6 months. Thank you. Encounter Diagnoses   Name Primary?     Bilateral carotid artery stenosis Yes    Cerebral vascular disease     SOB (shortness of breath)     Essential hypertension      current treatment plan is effective, no change in therapy  lab results and schedule of future lab studies reviewed with patient  reviewed diet, exercise and weight control  cardiovascular risk and specific lipid/LDL goals reviewed  use of aspirin to prevent MI and TIA's discussed      HPI   Today, Mr. Wicho Mcintyre comes in as a new patient. He has a longstanding history of peripheral vascular disease as well as hypertension. He presented with left hand numbness to [facility]. He was noted to have CVA. This was back around October 2021. Ultimately, he was diagnosed with under percent left ICA occlusion. His culprit was right ICA; he had right CEA performed on October 2021. He has not had any exertional chest pains. He has baseline dyspnea on exertion without change. He denies any orthopnea or PND. He denies any palpitation sensation. He has been struggling with blood pressure control. Review of Systems   Respiratory: Negative for shortness of breath. Cardiovascular: Negative for chest pain, palpitations, orthopnea, claudication, leg swelling and PND. Physical Exam  Vitals and nursing note reviewed. Constitutional:       Appearance: Normal appearance. HENT:      Head: Normocephalic. Eyes:      Conjunctiva/sclera: Conjunctivae normal.   Neck:      Vascular: No carotid bruit. Cardiovascular:      Rate and Rhythm: Normal rate and regular rhythm. Pulmonary:      Breath sounds: Normal breath sounds. Abdominal:      Palpations: Abdomen is soft. Musculoskeletal:         General: No swelling. Cervical back: No rigidity. Skin:     General: Skin is warm and dry. Neurological:      General: No focal deficit present. Mental Status: He is alert and oriented to person, place, and time.    Psychiatric:         Mood and Affect: Mood normal.         Behavior: Behavior normal.         PCP: Estephania Ying MD    Past Medical History:   Diagnosis Date    Cerebral artery occlusion with cerebral infarction ([facility] [address]     Diabetes mellitus     HTN (hypertension)     Unspecified hyperplasia of prostate with urinary obstruction and other lower urinary tract symptoms (LUTS)        Past Surgical History:   Procedure Laterality Date    HX THROMBOENDARTECTOMY Right 10/20/2021    THROMBOENDARTERECTOMY, CAROTID;  Surgeon: Lita Peralta MD    HX UROLOGICAL      Prostate Biopsy    [place] LASER VAPORIZATION SURGERY PROSTATE, COMPLETE         Current Outpatient Medications   Medication Sig Dispense Refill    albuterol (PROVENTIL HFA, VENTOLIN HFA, PROAIR HFA) 90 mcg/actuation inhaler Take 2 Puffs by inhalation every four (4) hours as needed for Wheezing. 3 Each 3    lisinopriL (PRINIVIL, ZESTRIL) 40 mg tablet Take 1 Tablet by mouth daily. 90 Tablet 3    diphenoxylate-atropine (LOMOTIL) 2.5-0.025 mg per tablet Take 1 Tablet by mouth four (4) times daily as needed for Diarrhea. Max Daily Amount: 4 Tablets. 30 Tablet 0    OTHER Prevagen 1 tab daily      cholecalciferol, vitamin D3, (VITAMIN D3 PO) Take  by mouth.  hydroCHLOROthiazide (MICROZIDE) 12.5 mg capsule Take 1 Capsule by mouth daily. 90 Capsule 3    fluticasone propion-salmeteroL (ADVAIR/WIXELA) 250-50 mcg/dose diskus inhaler Take 1 Puff by inhalation every twelve (12) hours. 3 Each 3    felodipine (PLENDIL SR) 5 mg 24 hr tablet Take 1 Tablet by mouth daily. 90 Tablet 3    clopidogreL (PLAVIX) 75 mg tab Take 75 mg by mouth daily.  flash glucose scanning reader ([employer] Jordy 14 Day [place]) misc by Does Not Apply route.  atorvastatin (LIPITOR) 40 mg tablet TAKE 1 TABLET DAILY (Patient taking differently: 20 mg.) 90 Tablet 3    carvediloL (Coreg) 25 mg tablet Take 1 Tab by mouth two (2) times a day. 180 Tab 3    lansoprazole (Prevacid) 30 mg capsule Take 1 Cap by mouth Daily (before breakfast). 90 Cap 3    insulin lispro (HUMALOG) 100 unit/mL kwikpen 15 units BID with meals      aspirin 81 mg cap 81 mg.      glucose blood VI test strips (ONETOUCH ULTRA TEST) strip 50 Each.       insulin glargine (LANTUS SOLOSTAR) 100 unit/mL (3 mL) pen Lantus 24 Units SC daily      glucose blood VI test strips (ASCENSIA CONTOUR) strip       Insulin Needles, Disposable, (AUBREY PEN NEEDLE) 32 x 5/32 \" ndle 1 each.  Lancets misc 1 each.  multivitamins-minerals-lutein (CENTRUM SILVER) Tab Take  by mouth.  sildenafil citrate (VIAGRA) 100 mg tablet Take 1 Tab by mouth as needed. 6 Tab 10       The patient has a family history of    Social History     Tobacco Use    Smoking status: Never Smoker    Smokeless tobacco: Never Used   Substance Use Topics    Alcohol use: No    Drug use: Not on file       Lab Results   Component Value Date/Time    Cholesterol, total 107 01/03/2022 09:20 AM    HDL Cholesterol 43 01/03/2022 09:20 AM    LDL, calculated 45 01/03/2022 09:20 AM    LDL, calculated 70 01/14/2021 10:12 AM    Triglyceride 102 01/03/2022 09:20 AM    CHOL/HDL Ratio 2.5 01/14/2021 10:12 AM        BP Readings from Last 3 Encounters:   01/14/22 (!) 164/92   11/18/21 121/64   11/04/21 (!) 148/70        Pulse Readings from Last 3 Encounters:   01/14/22 77   11/18/21 76   11/04/21 75       Wt Readings from Last 3 Encounters:   01/14/22 88.9 kg (196 lb)   12/01/21 90.7 kg (200 lb)   11/18/21 90.7 kg (200 lb)         EKG: normal EKG, normal sinus rhythm, unchanged from previous tracings.

## 2022-01-14 NOTE — PROGRESS NOTES
Gonzalez Molly. presents today for   Chief Complaint   Patient presents with    New Patient     referred by pcp for CAD and heart murmur4       Gonzalez Molly. preferred language for health care discussion is english/other. Is someone accompanying this pt? no    Is the patient using any DME equipment during [address]? no    Depression Screening:  3 most recent PHQ Screens 1/14/2022   Little interest or pleasure in doing things Not at all   Feeling down, depressed, irritable, or hopeless Not at all   Total Score PHQ 2 0       Learning Assessment:  Learning Assessment 1/14/2022   PRIMARY LEARNER Patient   BARRIERS PRIMARY LEARNER -   CO-LEARNER CAREGIVER -   PRIMARY LANGUAGE ENGLISH   LEARNER PREFERENCE PRIMARY DEMONSTRATION   ANSWERED BY patient   RELATIONSHIP SELF       Abuse Screening:  Abuse Screening Questionnaire 1/14/2022   Do you ever feel afraid of your partner? N   Are you in a relationship with someone who physically or mentally threatens you? N   Is it safe for you to go home? Y       Fall Risk  Fall Risk Assessment, last 12 mths 1/14/2022   Able to walk? Yes   Fall in past 12 months? 0   Do you feel unsteady? 0   Are you worried about falling 0       Pt currently taking Anticoagulant therapy? no    Coordination of Care:  1. Have you been to the ER, urgent care clinic since your last visit? Hospitalized since your last visit? no    2. Have you seen or consulted any other health care providers outside of the [address] [place] [address] since your last visit? Include any pap smears or colon screening.  no

## 2022-01-21 NOTE — PROGRESS NOTES
Lindsay Frances presents today for blood pressure follow-up. When seen by Dr Jazmyn Arita on 1/14/22, he asked him to return for blood pressure re-evaluation and to bring his blood pressure monitor with him. During that visit, his blood pressure was elevated. He had a renal duplex study done on 1/24/22 and it showed no significant stenosis of the renal arteries. Since his last visit, he has followed up with his nephrologist who decreased his lisinopril to 20mg daily (1/2 of his 40mg tablet). He has been checking his blood pressures at home with both his old monitor and a new BP monitor that was given to him by nephrology. He brought a diary of his BP readings and a review of the readings showed SBP between 105 and 147 mmHg and DBP between 54 and 79 mmHg. He is a 68year old male with history of hypertension, insulin dependent diabetes (has a CGM), dyslipidemia, and PVD (occluded left ICA and severely diseased right ICA (s/p right CEA). He is scheduled for a nuclear stress test and echo in early March 2022. Overall, he is feeling well. He is knows what medications he takes and can recite the dosages as well. Denies chest pain, tightness, heaviness, and palpitations. Denies shortness of breath at rest, dyspnea on exertion, orthopnea and PND. Denies abdominal bloating. Denies lightheadedness, dizziness, and syncope. Denies lower extremity edema and claudication. Denies nausea, vomiting, diarrhea, melena, hematochezia. Denies hematuria, urgency, frequency. Denies fever, chills.         PMH:  Past Medical History:   Diagnosis Date    Cerebral artery occlusion with cerebral infarction ([facility] [address]     Diabetes mellitus     HTN (hypertension)     Unspecified hyperplasia of prostate with urinary obstruction and other lower urinary tract symptoms (LUTS)        PSH:  Past Surgical History:   Procedure Laterality Date    HX THROMBOENDARTECTOMY Right 10/20/2021    THROMBOENDARTERECTOMY, CAROTID;  Surgeon: Diana Ham MD    HX UROLOGICAL      Prostate Biopsy    MO LASER VAPORIZATION SURGERY PROSTATE, COMPLETE         MEDS:  Current Outpatient Medications   Medication Sig    lisinopriL (PRINIVIL, ZESTRIL) 40 mg tablet Take 0.5 Tablets by mouth daily.  insulin glargine (LANTUS,BASAGLAR) 100 unit/mL (3 mL) inpn 38 Units by SubCUTAneous route nightly.  insulin lispro (HUMALOG) 100 unit/mL kwikpen 10 to 20 units BID with meals (as needed)    cholecalciferol (Vitamin D3) (5000 Units/125 mcg) tab tablet Take 1 Tablet by mouth daily.  acetaminophen (TYLENOL) 500 mg tablet Take 1 Tablet by mouth every six (6) hours as needed for Pain.  aspirin delayed-release 81 mg tablet Take 1 Tablet by mouth daily.  multivitamins-minerals-lutein (Centrum Silver) tab tablet Take 1 Tablet by mouth daily.  albuterol (PROVENTIL HFA, VENTOLIN HFA, PROAIR HFA) 90 mcg/actuation inhaler Take 2 Puffs by inhalation every four (4) hours as needed for Wheezing.  diphenoxylate-atropine (LOMOTIL) 2.5-0.025 mg per tablet Take 1 Tablet by mouth four (4) times daily as needed for Diarrhea. Max Daily Amount: 4 Tablets.  OTHER Prevagen 1 tab daily    hydroCHLOROthiazide (MICROZIDE) 12.5 mg capsule Take 1 Capsule by mouth daily.  fluticasone propion-salmeteroL (ADVAIR/WIXELA) 250-50 mcg/dose diskus inhaler Take 1 Puff by inhalation every twelve (12) hours.  felodipine (PLENDIL SR) 5 mg 24 hr tablet Take 1 Tablet by mouth daily.  clopidogreL (PLAVIX) 75 mg tab Take 75 mg by mouth daily.  flash glucose scanning reader (FreeStyle Jordy 14 Day [place]) misc by Does Not Apply route.  atorvastatin (LIPITOR) 40 mg tablet TAKE 1 TABLET DAILY (Patient taking differently: 20 mg.)    carvediloL (Coreg) 25 mg tablet Take 1 Tab by mouth two (2) times a day.  lansoprazole (Prevacid) 30 mg capsule Take 1 Cap by mouth Daily (before breakfast).  glucose blood VI test strips (ONETOUCH ULTRA TEST) strip 50 Each.     glucose blood VI test strips (ASCENSIA CONTOUR) strip     Insulin Needles, Disposable, (AUBREY PEN NEEDLE) 32 x 5/32 \" ndle 1 each.  Lancets misc 1 each.  sildenafil citrate (VIAGRA) 100 mg tablet Take 1 Tab by mouth as needed. No current facility-administered medications for this visit. Allergies and Sensitivities:  Allergies   Allergen Reactions    Lasix [Furosemide] Palpitations       Family History:  Family History   Problem Relation Age of Onset    Heart Attack Mother        Social History:  He  reports that he has never smoked. He has never used smokeless tobacco.  He  reports no history of alcohol use. Physical:  Visit Vitals  /60 (BP 1 Location: Left upper arm, BP Patient Position: Sitting, BP Cuff Size: Adult)   Pulse 64   Ht 5' 7\" (1.702 m)   Wt 90.7 kg (200 lb)   SpO2 99%   BMI 31.32 kg/m²     I checked his blood pressure with both of his home monitors and obtained the following: Old machine:  133/63   New machine:  131/55    Exam:  Neck:  Supple, no JVD, no carotid bruits  CV:  Normal S1 and  S2, no murmurs, rubs, or gallops noted  Lungs:  Clear to ausculation throughout, no wheezes or rales  Abd:  Soft, non-tender, non-distended with good bowel sounds.   No hepatosplenomegaly  Extremities:  Trace lower extremity edema      Data:  EKG:  Not done today      LABS:  Lab Results   Component Value Date/Time    Sodium 135 01/03/2022 09:20 AM    Potassium 4.4 01/03/2022 09:20 AM    Chloride 104 01/03/2022 09:20 AM    CO2 20 01/03/2022 09:20 AM    Glucose 250 (H) 01/03/2022 09:20 AM    BUN 33 (H) 01/03/2022 09:20 AM    Creatinine 1.54 (H) 01/03/2022 09:20 AM     Lab Results   Component Value Date/Time    Cholesterol, total 107 01/03/2022 09:20 AM    HDL Cholesterol 43 01/03/2022 09:20 AM    LDL, calculated 45 01/03/2022 09:20 AM    LDL, calculated 70 01/14/2021 10:12 AM    Triglyceride 102 01/03/2022 09:20 AM    CHOL/HDL Ratio 2.5 01/14/2021 10:12 AM     Lab Results   Component Value Date/Time    ALT (SGPT) 15 01/03/2022 09:20 AM         Impression/Plan:  1. Essential hypertension, blood pressure well controlled. BP checked manually and I also checked it with both of his home monitors and results were simiilar. 2.  Dyslipidemia, on atorvastatin 40mg  3. ICA disease, occluded left ICA and s/p right CEA  4. Insulin dependent diabetes mellitus, recommend Hgb A1c less than 7% from cardiac standpoint    Mr. Meryl Smiley was seen today for blood pressure management. During his last appointment, his blood pressure was elevated but he states that he had not taken his medications yet that morning. He took his medications this morning at 6:00am and his blood pressures were well controlled. His BP was checked manually (130/60) and I checked his BP using both of his home monitors (old monitor: 133/63, new monitor: 131/55) and all readings were within a very close range. He tells me that his nephrologist recently decreased his lisinopril to 20mg daily (from 40mg daily) as he sometimes obtains low blood pressure readings at home (SBP as low as 102 mmHg per his BP diary). He brought a diary of his BP readings and a review of the readings showed SBP between 105 and 147 mmHg and DBP between 54 and 79 mmHg. No changes were made to his medications today. I updated his list of medications in his chart. Teaching done today re:  Low sodium diet and watching his fluid intake as he states that he drinks about 10 bottles of water per day (16.9 oz each). Results of his renal duplex study discussed with him today as well. He was reminded of his stress test and echocardiogram scheduled for March 7, 2022. Time spent with patient:  30 minutes    He will follow-up with Dr. Linn Stockton as scheduled and as needed. Irina Ulrich MSN, FNP-BC    Please note:  Portions of this chart were created with Dragon medical speech to text program.  Unrecognized errors may be present.

## 2022-01-24 ENCOUNTER — HOSPITAL ENCOUNTER (OUTPATIENT)
Dept: VASCULAR SURGERY | Age: 77
Discharge: HOME | End: 2022-01-24
Attending: INTERNAL MEDICINE
Payer: COMMERCIAL

## 2022-01-24 DIAGNOSIS — I10 ESSENTIAL HYPERTENSION: ICD-10-CM

## 2022-01-24 LAB
ABDOMINAL PROX AORTA VEL: 68.8 CM/S
LEFT INTERLOBAR EDV: 12.2 CM/S
LEFT INTERLOBAR PSV: 50.2 CM/S
LEFT INTERLOBAR RI: 0.8
LEFT KIDNEY LENGTH: 10.58 CM
LEFT RENAL DIST DIAS: 14.7 CM/S
LEFT RENAL DIST RAR: 0.83
LEFT RENAL DIST RI: 0.74
LEFT RENAL DIST SYS: 57 CM/S
LEFT RENAL LOWER PARENCHYMA MAX: 17.8 CM/S
LEFT RENAL LOWER PARENCHYMA MIN: 6.7 CM/S
LEFT RENAL LOWER PARENCHYMA RI: 0.62
LEFT RENAL MID DIAS: 19.9 CM/S
LEFT RENAL MID RAR: 0.94
LEFT RENAL MID RI: 0.69
LEFT RENAL MID SYS: 65 CM/S
LEFT RENAL MIDDLE PARENCHYMA MAX: 15.3 CM/S
LEFT RENAL MIDDLE PARENCHYMA MIN: 6.1 CM/S
LEFT RENAL MIDDLE PARENCHYMA RI: 0.6
LEFT RENAL ORIGIN DIAS: 9.5 CM/S
LEFT RENAL ORIGIN RAR: 0.74
LEFT RENAL ORIGIN RI: 0.81
LEFT RENAL ORIGIN SYS: 50.7 CM/S
LEFT RENAL PROX DIAS: 19.5 CM/S
LEFT RENAL PROX RAR: 0.94
LEFT RENAL PROX RI: 0.7
LEFT RENAL PROX SYS: 64.4 CM/S
LEFT RENAL UPPER PARENCHYMA MAX: 21.4 CM/S
LEFT RENAL UPPER PARENCHYMA MIN: 9.2 CM/S
LEFT RENAL UPPER PARENCHYMA RI: 0.57
PROX AORTIC AP: 2.11 CM
PROX AORTIC TRANS: 2.14 CM
RIGHT INTERLOBAR EDV: 12 CM/S
RIGHT INTERLOBAR PSV: 39.5 CM/S
RIGHT INTERLOBAR RI: 0.7
RIGHT KIDNEY LENGTH: 10.77 CM
RIGHT KIDNEY WIDTH: 10.33 CM
RIGHT RENAL DIST DIAS: 15.1 CM/S
RIGHT RENAL DIST RAR: 1.37
RIGHT RENAL DIST RI: 0.84
RIGHT RENAL DIST SYS: 94.2 CM/S
RIGHT RENAL LOWER PARENCHYMA MAX: 13.9 CM/S
RIGHT RENAL LOWER PARENCHYMA MIN: 5.2 CM/S
RIGHT RENAL LOWER PARENCHYMA RI: 0.63
RIGHT RENAL MID DIAS: 19.5 CM/S
RIGHT RENAL MID RAR: 1.34
RIGHT RENAL MID RI: 0.79
RIGHT RENAL MID SYS: 92 CM/S
RIGHT RENAL MIDDLE PARENCHYMA MAX: 13.9 CM/S
RIGHT RENAL MIDDLE PARENCHYMA MIN: 6.1 CM/S
RIGHT RENAL MIDDLE PARENCHYMA RI: 0.56
RIGHT RENAL ORIGIN DIAS: 23.9 CM/S
RIGHT RENAL ORIGIN RAR: 1.66
RIGHT RENAL ORIGIN RI: 0.79
RIGHT RENAL ORIGIN SYS: 113.9 CM/S
RIGHT RENAL PROX DIAS: 19.5 CM/S
RIGHT RENAL PROX RAR: 1.66
RIGHT RENAL PROX RI: 0.83
RIGHT RENAL PROX SYS: 113.9 CM/S
RIGHT RENAL UPPER PARENCHYMA MAX: 13.9 CM/S
RIGHT RENAL UPPER PARENCHYMA MIN: 6.5 CM/S
RIGHT RENAL UPPER PARENCHYMA RI: 0.53

## 2022-01-24 PROCEDURE — 93975 VASCULAR STUDY: CPT

## 2022-01-24 NOTE — PROGRESS NOTES
Per your last note\" Mr. Carlos Enrique Zapata has no documented history of CAD. However, he does have documented history of ICA disease. Around October 2021, he presented with left hand numbness to [facility]. He was then noted to have occluded left ICA and severely diseased right ICA. He had right CEA performed during that hospitalization. He also has a longstanding history. He denies previous history of tobacco use. He has history of IDDM. He has history of dyslipidemia on Lipitor. From cardiac standpoint, he has no complaints of chest pains or worsening dyspnea on exertion. His blood pressure is elevated. It is 164/92. He states that he did not take his blood pressure medications today because of his doctors visits. He does keep blood pressure diary at home. He measures his blood pressure at home at least once a day often twice a day. His rhythm remained stable sinus. There is no evidence of decompensated CHF noted. His weight today is 196 pounds. He denies previous use of tobacco.  His target LDL is less than 70. He remains on aspirin and Plavix. I have asked him to come back in 1 month to see my nurse practitioner with his BP diary and his home blood pressure machines. We will correlate his machines with our office. If he remains hypertensive, I would add clonidine 0.1 mg daily. With his numerous coronary risk factors and known recent history of right CEA, I have recommended proceeding on with an echocardiogram as well as pharmacologic nuclear scan.   He is not able to ambulate; therefore, regular stress test cannot be done

## 2022-01-27 ENCOUNTER — OFFICE VISIT (OUTPATIENT)
Dept: CARDIOLOGY CLINIC | Age: 77
End: 2022-01-27
Payer: COMMERCIAL

## 2022-01-27 VITALS
DIASTOLIC BLOOD PRESSURE: 60 MMHG | SYSTOLIC BLOOD PRESSURE: 130 MMHG | OXYGEN SATURATION: 99 % | BODY MASS INDEX: 31.39 KG/M2 | HEART RATE: 64 BPM | HEIGHT: 67 IN | WEIGHT: 200 LBS

## 2022-01-27 DIAGNOSIS — E11.22 TYPE 2 DM WITH CKD STAGE 3 AND HYPERTENSION (HCC): ICD-10-CM

## 2022-01-27 DIAGNOSIS — N18.30 TYPE 2 DM WITH CKD STAGE 3 AND HYPERTENSION (HCC): ICD-10-CM

## 2022-01-27 DIAGNOSIS — E78.2 MIXED HYPERLIPIDEMIA: ICD-10-CM

## 2022-01-27 DIAGNOSIS — I12.9 TYPE 2 DM WITH CKD STAGE 3 AND HYPERTENSION (HCC): ICD-10-CM

## 2022-01-27 DIAGNOSIS — I10 ESSENTIAL HYPERTENSION: Primary | ICD-10-CM

## 2022-01-27 PROCEDURE — 99214 OFFICE O/P EST MOD 30 MIN: CPT | Performed by: NURSE PRACTITIONER

## 2022-01-27 RX ORDER — LISINOPRIL 40 MG/1
20 TABLET ORAL DAILY
Qty: 1 TABLET | Refills: 0
Start: 2022-01-27

## 2022-01-27 RX ORDER — INSULIN GLARGINE 100 [IU]/ML
38 INJECTION, SOLUTION SUBCUTANEOUS
Qty: 1 PEN | Refills: 0
Start: 2022-01-27

## 2022-01-27 RX ORDER — INSULIN LISPRO 100 [IU]/ML
INJECTION, SOLUTION INTRAVENOUS; SUBCUTANEOUS
Qty: 1 ADJUSTABLE DOSE PRE-FILLED PEN SYRINGE | Refills: 0
Start: 2022-01-27

## 2022-01-27 RX ORDER — CHOLECALCIFEROL TAB 125 MCG (5000 UNIT) 125 MCG
5000 TAB ORAL DAILY
Qty: 1 TABLET | Refills: 0
Start: 2022-01-27

## 2022-01-27 RX ORDER — ACETAMINOPHEN 500 MG
500 TABLET ORAL
Qty: 1 TABLET | Refills: 0
Start: 2022-01-27

## 2022-01-27 RX ORDER — ASPIRIN 81 MG/1
81 TABLET ORAL DAILY
Qty: 1 TABLET | Refills: 0
Start: 2022-01-27

## 2022-01-27 NOTE — PROGRESS NOTES
Adan Angeles. presents today for   Chief Complaint   Patient presents with    Follow-up     2-3 week follow up- no complaints        [place] K[place]ik. preferred language for health care discussion is english/other. Is someone accompanying this pt? no    Is the patient using any DME equipment during [address]? [place]     Depression Screening:  3 most recent PHQ Screens 1/27/2022   Little interest or pleasure in doing things Not at all   Feeling down, depressed, irritable, or hopeless Not at all   Total Score PHQ 2 0       Learning Assessment:  Learning Assessment 1/14/2022   PRIMARY LEARNER Patient   BARRIERS PRIMARY LEARNER -   CO-LEARNER CAREGIVER -   PRIMARY LANGUAGE ENGLISH   LEARNER PREFERENCE PRIMARY DEMONSTRATION   ANSWERED BY patient   RELATIONSHIP SELF       Abuse Screening:  Abuse Screening Questionnaire 1/27/2022   Do you ever feel afraid of your partner? N   Are you in a relationship with someone who physically or mentally threatens you? N   Is it safe for you to go home? Y       Fall Risk  Fall Risk Assessment, last 12 mths 1/27/2022   Able to walk? Yes   Fall in past 12 months? 0   Do you feel unsteady? 0   Are you worried about falling 0       Pt currently taking Anticoagulant therapy? No but taking Plavix 75mg     Coordination of Care:  1. Have you been to the ER, urgent care clinic since your last visit? Hospitalized since your last visit? no    2. Have you seen or consulted any other health care providers outside of the [address] [place] [address] since your last visit? Include any pap smears or colon screening.  no

## 2022-01-27 NOTE — PATIENT INSTRUCTIONS
Continue present medication regimen  Continue monitoring blood pressures at home and record the readings  Follow-up with Dr. Bella Chu as scheduled and as needed  Low sodium diet, 1500mg per day     Learning About High Blood Pressure  What is high blood pressure? Blood pressure is a measure of how hard the blood pushes against the walls of your arteries. It's normal for blood pressure to go up and down throughout the day. But if it stays up, you have high blood pressure. Another name for high blood pressure is hypertension. Two numbers tell you your blood pressure. The first number is the systolic pressure (top number). It shows how hard the blood pushes when your heart is pumping. The second number is the diastolic pressure (bottom number). It shows how hard the blood pushes between heartbeats, when your heart is relaxed and filling with blood. Your doctor will give you a goal for your blood pressure based on your health and your age. High blood pressure (hypertension) means that the top number stays high, or the bottom number stays high, or both. High blood pressure increases the risk of stroke, heart attack, and other problems. What happens when you have high blood pressure? · Blood flows through your arteries with too much force. Over time, this can damage the heart and the walls of your arteries. But you can't feel it. High blood pressure usually doesn't cause symptoms. · High blood pressure makes your heart work harder. And that can lead to heart failure, which means your heart doesn't pump as much blood as your body needs. · Fat and calcium start to build up in your arteries. This buildup is called hardening of the arteries. It can cause many problems including a heart attack and stroke. · Arteries also carry blood and oxygen to organs like your eyes, kidneys, and brain.  If high blood pressure damages those arteries, it can lead to vision loss, kidney disease, stroke, and a higher risk of dementia. How can you prevent high blood pressure? · Stay at a healthy weight. · Try to limit how much sodium you eat to less than 2,300 milligrams (mg) a day. If you limit your sodium to 1,500 mg a day, you can lower your blood pressure even more. ? Buy foods that are labeled \"unsalted,\" \"sodium-free,\" or \"low-sodium. \" Foods labeled \"reduced-sodium\" and \"light sodium\" may still have too much sodium. ? Flavor your food with garlic, lemon juice, onion, vinegar, herbs, and spices instead of salt. Do not use soy sauce, steak sauce, onion salt, garlic salt, mustard, or ketchup on your food. ? Use less salt (or none) when recipes call for it. You can often use half the salt a recipe calls for without losing flavor. · Be physically active. Get at least 30 minutes of exercise on most days of the week. Walking is a good choice. You also may want to do other activities, such as running, swimming, cycling, or playing tennis or team sports. · Limit alcohol to 2 drinks a day for men and 1 drink a day for women. · Eat plenty of fruits, vegetables, and low-fat dairy products. Eat less saturated and total fats. How is high blood pressure treated? · Your doctor will suggest making lifestyle changes to help your heart. For example, your doctor may ask you to eat healthy foods, quit smoking, lose extra weight, and be more active. · If lifestyle changes don't help enough, your doctor may recommend that you take medicine. · When blood pressure is very high, medicines are needed to lower it. Follow-up care is a key part of your treatment and safety. Be sure to make and go to all appointments, and call your doctor if you are having problems. It's also a good idea to know your test results and keep a list of the medicines you take. Where can you learn more? Go to http://www.amaya.com/  Enter P501 in the search box to learn more about \"Learning About High Blood Pressure. \"  Current as of: April 29, 2021               Content Version: 13.0  © 2006-2021 [employer]. Care instructions adapted under license by [employer] (which disclaims liability or warranty for this information). If you have questions about a medical condition or this instruction, always ask your healthcare professional. [address] any warranty or liability for your use of this information. Low Sodium Diet (2,000 Milligram): Care Instructions  Overview     Limiting sodium can be an important part of managing some health problems. The most common source of sodium is salt. People get most of the salt in their diet from canned, prepared, and packaged foods. Fast food and restaurant meals also are very high in sodium. Your doctor will probably limit your sodium to less than 2,000 milligrams (mg) a day. This limit counts all the sodium in prepared and packaged foods and any salt you add to your food. Follow-up care is a key part of your treatment and safety. Be sure to make and go to all appointments, and call your doctor if you are having problems. It's also a good idea to know your test results and keep a list of the medicines you take. How can you care for yourself at home? Read food labels  · Read labels on cans and food packages. The labels tell you how much sodium is in each serving. Make sure that you look at the serving size. If you eat more than the serving size, you have eaten more sodium. · Food labels also tell you the Percent Daily Value for sodium. Choose products with low Percent Daily Values for sodium. · Be aware that sodium can come in forms other than salt, including monosodium glutamate (MSG), sodium citrate, and sodium bicarbonate (baking soda). MSG is often added to Asian food. When you eat out, you can sometimes ask for food without MSG or added salt.   Buy low-sodium foods  · Buy foods that are labeled \"unsalted\" (no salt added), \"sodium-free\" (less than 5 mg of sodium per serving), or \"low-sodium\" (140 mg or less of sodium per serving). Foods labeled \"reduced-sodium\" and \"light sodium\" may still have too much sodium. Be sure to read the label to see how much sodium you are getting. · Buy fresh vegetables, or frozen vegetables without added sauces. Buy low-sodium versions of canned vegetables, soups, and other canned goods. Prepare low-sodium meals  · Cut back on the amount of salt you use in cooking. This will help you adjust to the taste. Do not add salt after cooking. One teaspoon of salt has about 2,300 mg of sodium. · Take the salt shaker off the table. · Flavor your food with garlic, lemon juice, onion, vinegar, herbs, and spices. Do not use soy sauce, lite soy sauce, steak sauce, onion salt, garlic salt, celery salt, or ketchup on your food. · Use low-sodium salad dressings, sauces, and ketchup. Or make your own salad dressings and sauces without adding salt. · Use less salt (or none) when recipes call for it. You can often use half the salt a recipe calls for without losing flavor. Other foods such as rice, pasta, and grains do not need added salt. · Rinse canned vegetables, and cook them in fresh water. This removes some--but not all--of the salt. · Avoid water that is naturally high in sodium or that has been treated with water softeners, which add sodium. If you buy bottled water, read the label and choose a sodium-free brand. Avoid high-sodium foods  · Avoid eating:  ? Smoked, cured, salted, and canned meat, fish, and poultry. ? Ham, valdez, hot dogs, and luncheon meats. ? Regular, hard, and processed cheese and regular peanut butter. ? Crackers with salted tops, and other salted snack foods such as pretzels, chips, and salted popcorn. ? Frozen prepared meals, unless labeled low-sodium. ? Canned and dried soups, broths, and bouillon, unless labeled sodium-free or low-sodium. ? Canned vegetables, unless labeled sodium-free or low-sodium. ?  [place] fries, pizza, tacos, and other fast foods. ? Pickles, olives, ketchup, and other condiments, especially soy sauce, unless labeled sodium-free or low-sodium. Where can you learn more? Go to http://www.gray.com/  Enter V843 in the search box to learn more about \"Low Sodium Diet (2,000 Milligram): Care Instructions. \"  Current as of: December 17, 2020               Content Version: 13.0  © 2722-2908 [employer]. Care instructions adapted under license by [employer] (which disclaims liability or warranty for this information). If you have questions about a medical condition or this instruction, always ask your healthcare professional. [address] any warranty or liability for your use of this information.

## 2022-01-28 ENCOUNTER — OFFICE VISIT (OUTPATIENT)
Dept: INTERNAL MEDICINE CLINIC | Age: 77
End: 2022-01-28
Payer: COMMERCIAL

## 2022-01-28 ENCOUNTER — TELEPHONE (OUTPATIENT)
Dept: INTERNAL MEDICINE CLINIC | Age: 77
End: 2022-01-28

## 2022-01-28 VITALS
HEART RATE: 64 BPM | OXYGEN SATURATION: 98 % | SYSTOLIC BLOOD PRESSURE: 150 MMHG | DIASTOLIC BLOOD PRESSURE: 76 MMHG | RESPIRATION RATE: 18 BRPM | HEIGHT: 67 IN | BODY MASS INDEX: 31.23 KG/M2 | TEMPERATURE: 97.7 F | WEIGHT: 199 LBS

## 2022-01-28 DIAGNOSIS — I73.9 PVD (PERIPHERAL VASCULAR DISEASE) (HCC): ICD-10-CM

## 2022-01-28 DIAGNOSIS — J61 ASBESTOSIS (HCC): ICD-10-CM

## 2022-01-28 DIAGNOSIS — Z79.4 TYPE 2 DIABETES MELLITUS WITH STAGE 3B CHRONIC KIDNEY DISEASE, WITH LONG-TERM CURRENT USE OF INSULIN (HCC): Primary | ICD-10-CM

## 2022-01-28 DIAGNOSIS — N18.32 TYPE 2 DIABETES MELLITUS WITH STAGE 3B CHRONIC KIDNEY DISEASE, WITH LONG-TERM CURRENT USE OF INSULIN (HCC): Primary | ICD-10-CM

## 2022-01-28 DIAGNOSIS — I10 PRIMARY HYPERTENSION: ICD-10-CM

## 2022-01-28 DIAGNOSIS — E11.22 TYPE 2 DIABETES MELLITUS WITH STAGE 3B CHRONIC KIDNEY DISEASE, WITH LONG-TERM CURRENT USE OF INSULIN (HCC): Primary | ICD-10-CM

## 2022-01-28 DIAGNOSIS — E78.2 MIXED HYPERLIPIDEMIA: ICD-10-CM

## 2022-01-28 PROCEDURE — 99214 OFFICE O/P EST MOD 30 MIN: CPT | Performed by: INTERNAL MEDICINE

## 2022-01-28 NOTE — TELEPHONE ENCOUNTER
Please order future labs/pt goes offsite ([facility]) for collection. Return in about 3 months (around 4/28/2022) for labs 1 week before.     Please let me know and I will mail them

## 2022-01-28 NOTE — PATIENT INSTRUCTIONS
High Blood Pressure: Care Instructions  Overview     It's normal for blood pressure to go up and down throughout the day. But if it stays up, you have high blood pressure. Another name for high blood pressure is hypertension. Despite what a lot of people think, high blood pressure usually doesn't cause headaches or make you feel dizzy or lightheaded. It usually has no symptoms. But it does increase your risk of stroke, heart attack, and other problems. You and your doctor will talk about your risks of these problems based on your blood pressure. Your doctor will give you a goal for your blood pressure. Your goal will be based on your health and your age. Lifestyle changes, such as eating healthy and being active, are always important to help lower blood pressure. You might also take medicine to reach your blood pressure goal.  Follow-up care is a key part of your treatment and safety. Be sure to make and go to all appointments, and call your doctor if you are having problems. It's also a good idea to know your test results and keep a list of the medicines you take. How can you care for yourself at home? Medical treatment  · If you stop taking your medicine, your blood pressure will go back up. You may take one or more types of medicine to lower your blood pressure. Be safe with medicines. Take your medicine exactly as prescribed. Call your doctor if you think you are having a problem with your medicine. · Talk to your doctor before you start taking aspirin every day. Aspirin can help certain people lower their risk of a heart attack or stroke. But taking aspirin isn't right for everyone, because it can cause serious bleeding. · See your doctor regularly. You may need to see the doctor more often at first or until your blood pressure comes down. · If you are taking blood pressure medicine, talk to your doctor before you take decongestants or anti-inflammatory medicine, such as ibuprofen.  Some of these medicines can raise blood pressure. · Learn how to check your blood pressure at home. Lifestyle changes  · Stay at a healthy weight. This is especially important if you put on weight around the waist. Losing even 10 pounds can help you lower your blood pressure. · If your doctor recommends it, get more exercise. Walking is a good choice. Bit by bit, increase the amount you walk every day. Try for at least 30 minutes on most days of the week. You also may want to swim, bike, or do other activities. · Avoid or limit alcohol. Talk to your doctor about whether you can drink any alcohol. · Try to limit how much sodium you eat to less than 2,300 milligrams (mg) a day. Your doctor may ask you to try to eat less than 1,500 mg a day. · Eat plenty of fruits (such as bananas and oranges), vegetables, legumes, whole grains, and low-fat dairy products. · Lower the amount of saturated fat in your diet. Saturated fat is found in animal products such as milk, cheese, and meat. Limiting these foods may help you lose weight and also lower your risk for heart disease. · Do not smoke. Smoking increases your risk for heart attack and stroke. If you need help quitting, talk to your doctor about stop-smoking programs and medicines. These can increase your chances of quitting for good. When should you call for help? Call 911  anytime you think you may need emergency care. This may mean having symptoms that suggest that your blood pressure is causing a serious heart or blood vessel problem. Your blood pressure may be over 180/120. For example, call 911 if:    · You have symptoms of a heart attack. These may include:  ? Chest pain or pressure, or a strange feeling in the chest.  ? Sweating. ? Shortness of breath. ? Nausea or vomiting. ? Pain, pressure, or a strange feeling in the back, neck, jaw, or upper belly or in one or both shoulders or arms. ? Lightheadedness or sudden weakness.   ? A fast or irregular heartbeat.     · You have symptoms of a stroke. These may include:  ? Sudden numbness, tingling, weakness, or loss of movement in your face, arm, or leg, especially on only one side of your body. ? Sudden vision changes. ? Sudden trouble speaking. ? Sudden confusion or trouble understanding simple statements. ? Sudden problems with walking or balance. ? A sudden, severe headache that is different from past headaches.     · You have severe back or belly pain. Do not wait until your blood pressure comes down on its own. Get help right away. Call your doctor now or seek immediate care if:    · Your blood pressure is much higher than normal (such as 180/120 or higher), but you don't have symptoms.     · You think high blood pressure is causing symptoms, such as:  ? Severe headache.  ? Blurry vision. Watch closely for changes in your health, and be sure to contact your doctor if:    · Your blood pressure measures higher than your doctor recommends at least 2 times. That means the top number is higher or the bottom number is higher, or both.     · You think you may be having side effects from your blood pressure medicine. Where can you learn more? Go to http://www.gray.com/  Enter [id number] in the search box to learn more about \"High Blood Pressure: Care Instructions. \"  Current as of: April 29, 2021               Content Version: 13.0  © 2006-2021 [employer]. Care instructions adapted under license by [employer] (which disclaims liability or warranty for this information). If you have questions about a medical condition or this instruction, always ask your healthcare professional. [place] [address] any warranty or liability for your use of this information.

## 2022-01-28 NOTE — PROGRESS NOTES
Patient is in the office today for a 3 month follow up. Do you have an Advance Directive no  Do you want more information : information given     1. \"Have you been to the ER, urgent care clinic since your last visit? Hospitalized since your last visit? \" No    2. \"Have you seen or consulted any other health care providers outside of the [address] [place] [address] since your last visit? \" No     3. For patients aged 39-70: Has the patient had a colonoscopy / FIT/ Cologuard? NA - based on age      If the patient is female:    4. For patients aged 41-77: Has the patient had a mammogram within the past 2 years? NA - based on age or sex  See top three    5. For patients aged 21-65: Has the patient had a pap smear?  NA - based on age or sex

## 2022-02-03 NOTE — PROGRESS NOTES
Subjective:       Chief Complaint  The patient presents for follow up of diabetes, hypertension and high cholesterol. JOAO Chu is a 68 y.o. male seen for follow up of diabetes. Healso has hypertension and hyperlipidemia. Diabetes uncontrolled, on Lantus insulin 40 units daily and Humalog 15 units BID with meals, pt is followed by Endocrine, Dr Paz Lowry office, patient needs guidance with his diabetes so we will consider referring to Pharm. D. once patient recovered from recent CVA, hypertension  uncontrolled,  on lisinopril 40 mg and coreg 25 mg BD, Plendil 5 mg and HCTZ 25 mg daily, patient does have a history of chronic kidney disease for which he is seeing nephrology, patient misses his blood pressure medicines at times because he is concerned about having low blood pressures, patient needs to bring in a list of his blood pressure readings from home which she will try and do next office visit hyperlipidemia no significant medication side effects noted, well controlled, on Lipitor 40 mg. . He has h/o CVA and recent right carotid endarterectomy. Diet and Lifestyle: generally follows a low fat low cholesterol diet, does not rigorously follow a diabetic diet, exercises sporadically    Home BP Monitoring: is controlled at home,    Diabetic Review of Systems - home glucose monitoring: is performed regularly, 3x/day. Other symptoms and concerns: Patient has a history of asbestosis exposure. Consider chest x-ray follow-up in the near future. Discussed the patient's BMI with him. The BMI follow up plan is as follows: I have counseled this patient on diet and exercise regimens. [place] in the [place].  It has been over 5 years. Cologuard done June 2021 was negative. At age 76 no further testing needed unless patient has symptoms. Patient has a history of asthma and is doing fairly well on Advair.     Patient has a history of TIAs/CVA  in the past and has a completely occluded left carotid artery. This was noted in 2015. Patient to continue on aspirin 81 mg daily and statin. As mentioned above patient had recent right carotid endarterectomy after he had recent CVA. Patient continues on vitamin D 5000 units/day for his vitamin D deficiency  Current Outpatient Medications   Medication Sig    lisinopriL (PRINIVIL, ZESTRIL) 40 mg tablet Take 0.5 Tablets by mouth daily.  insulin glargine (LANTUS,BASAGLAR) 100 unit/mL (3 mL) inpn 38 Units by SubCUTAneous route nightly.  insulin lispro (HUMALOG) 100 unit/mL kwikpen 10 to 20 units BID with meals (as needed)    cholecalciferol (Vitamin D3) (5000 Units/125 mcg) tab tablet Take 1 Tablet by mouth daily.  acetaminophen (TYLENOL) 500 mg tablet Take 1 Tablet by mouth every six (6) hours as needed for Pain.  aspirin delayed-release 81 mg tablet Take 1 Tablet by mouth daily.  multivitamins-minerals-lutein (Centrum Silver) tab tablet Take 1 Tablet by mouth daily.  albuterol (PROVENTIL HFA, VENTOLIN HFA, PROAIR HFA) 90 mcg/actuation inhaler Take 2 Puffs by inhalation every four (4) hours as needed for Wheezing.  diphenoxylate-atropine (LOMOTIL) 2.5-0.025 mg per tablet Take 1 Tablet by mouth four (4) times daily as needed for Diarrhea. Max Daily Amount: 4 Tablets.  OTHER Prevagen 1 tab daily    hydroCHLOROthiazide (MICROZIDE) 12.5 mg capsule Take 1 Capsule by mouth daily.  fluticasone propion-salmeteroL (ADVAIR/WIXELA) 250-50 mcg/dose diskus inhaler Take 1 Puff by inhalation every twelve (12) hours.  felodipine (PLENDIL SR) 5 mg 24 hr tablet Take 1 Tablet by mouth daily.  clopidogreL (PLAVIX) 75 mg tab Take 75 mg by mouth daily.  flash glucose scanning reader ([employer] [address] [place]) misc by Does Not Apply route.  atorvastatin (LIPITOR) 40 mg tablet TAKE 1 TABLET DAILY (Patient taking differently: 20 mg.)    carvediloL (Coreg) 25 mg tablet Take 1 Tab by mouth two (2) times a day.  lansoprazole (Prevacid) 30 mg capsule Take 1 Cap by mouth Daily (before breakfast).  glucose blood VI test strips (ONETOUCH ULTRA TEST) strip 50 Each.  glucose blood VI test strips (ASCENSIA CONTOUR) strip     Insulin Needles, Disposable, (AUBREY PEN NEEDLE) 32 x 5/32 \" ndle 1 each.  Lancets misc 1 each.  sildenafil citrate (VIAGRA) 100 mg tablet Take 1 Tab by mouth as needed. No current facility-administered medications for this visit.              Review of Systems  Respiratory: negative for dyspnea on exertion  Cardiovascular: negative for chest pain    Objective:     Visit Vitals  BP (!) 150/76 (BP 1 Location: Left arm, BP Patient Position: Sitting, BP Cuff Size: Adult)   Pulse 64   Temp 97.7 °F (36.5 °C) (Temporal)   Resp 18   Ht 5' 7\" (1.702 m)   Wt 199 lb (90.3 kg)   SpO2 98%   BMI 31.17 kg/m²        Chest - clear to auscultation, no wheezes, rales or rhonchi, symmetric air entry  Heart - normal rate and regular rhythm, regular rhythm, systolic murmur 2/6 at 2nd left intercostal space  Extremities - pedal edema 1 +      Labs:   Lab Results   Component Value Date/Time    Cholesterol, total 107 01/03/2022 09:20 AM    HDL Cholesterol 43 01/03/2022 09:20 AM    LDL, calculated 45 01/03/2022 09:20 AM    LDL, calculated 70 01/14/2021 10:12 AM    Triglyceride 102 01/03/2022 09:20 AM    CHOL/HDL Ratio 2.5 01/14/2021 10:12 AM     Lab Results   Component Value Date/Time    Prostate Specific Ag 0.8 10/06/2020 09:10 AM    Prostate Specific Ag 0.8 07/06/2016 09:45 AM    Prostate Specific Ag 1.4 12/30/2015 10:30 AM     Lab Results   Component Value Date/Time    Sodium 135 01/03/2022 09:20 AM    Potassium 4.4 01/03/2022 09:20 AM    Chloride 104 01/03/2022 09:20 AM    CO2 20 01/03/2022 09:20 AM    Anion gap 6 01/14/2021 10:12 AM    Glucose 250 (H) 01/03/2022 09:20 AM    BUN 33 (H) 01/03/2022 09:20 AM    Creatinine 1.54 (H) 01/03/2022 09:20 AM    BUN/Creatinine ratio 21 01/03/2022 09:20 AM    GFR est AA 50 (L) 01/03/2022 09:20 AM    GFR est non-AA 43 (L) 01/03/2022 09:20 AM    Calcium 9.5 01/03/2022 09:20 AM    Bilirubin, total <0.2 01/03/2022 09:20 AM    ALT (SGPT) 15 01/03/2022 09:20 AM    Alk. phosphatase 85 01/03/2022 09:20 AM    Protein, total 6.5 01/03/2022 09:20 AM    Albumin 3.9 01/03/2022 09:20 AM    Globulin 3.3 01/14/2021 10:12 AM    A-G Ratio 1.5 01/03/2022 09:20 AM      Lab Results   Component Value Date/Time    Hemoglobin A1c 9.2 (H) 01/03/2022 09:20 AM            Assessment / Plan     Diabetes uncontrolled, on Lantus 40 units daily and Humalog 12 units 2 times daily with meals. Patient being followed by endocrine. Patient needs intensive follow-up with his diet and his regimen will refer to Pharm. D. if not improved by next office visit   hypertension uncontrolled on Coreg and lisinopril and Plendil and  HCTZ 25 mg daily. Patient to try and bring in record of his blood pressure readings next office visits  Hyperlipidemia well controlled, on Lipitor 40 mg. ICD-10-CM ICD-9-CM    1. Type 2 diabetes mellitus with stage 3b chronic kidney disease, with long-term current use of insulin ([facility])  E11.22 250.40     N18.32 585.3     Z79.4 V58.67    2. Mixed hyperlipidemia  E78.2 272.2    3. Primary hypertension  I10 401.9    4. PVD (peripheral vascular disease) ([facility])  I73.9 443. 9  patient status post recent right carotid endarterectomy and has a history of occluded left carotid artery. He continues on Plavix and statin   5. Asbestosis [facility])  [id number]  patient currently stable consider follow-up chest x-ray in the near future                Diabetic issues reviewed with him: diabetic diet discussed in detail, and low cholesterol diet, weight control and daily exercise discussed. Follow-up and Dispositions    · Return in about 3 months (around 4/28/2022) for labs 1 week before. Reviewed plan of care. Patient has provided input and agrees with goals.

## 2022-02-14 ENCOUNTER — TELEPHONE (OUTPATIENT)
Dept: INTERNAL MEDICINE CLINIC | Age: 77
End: 2022-02-14

## 2022-02-14 RX ORDER — AMLODIPINE BESYLATE 5 MG/1
5 TABLET ORAL DAILY
Qty: 90 TABLET | Refills: 3 | Status: SHIPPED | OUTPATIENT
Start: 2022-02-14 | End: 2022-05-04

## 2022-02-14 NOTE — TELEPHONE ENCOUNTER
I show that Norvasc was discontinued on 10/13/20 due to side effects.  Please advise    Last visit:  1/28/22  Next appt:  4/29/22

## 2022-02-16 DIAGNOSIS — D50.0 IRON DEFICIENCY ANEMIA DUE TO CHRONIC BLOOD LOSS: Primary | ICD-10-CM

## 2022-03-04 ENCOUNTER — TELEPHONE (OUTPATIENT)
Dept: CARDIOLOGY CLINIC | Age: 77
End: 2022-03-04

## 2022-03-04 NOTE — TELEPHONE ENCOUNTER
Unable to contact patient to confirm echo appointment. Left voicemail.  Needs to r/s nuclear stress test for 3/7

## 2022-03-10 ENCOUNTER — TELEPHONE (OUTPATIENT)
Dept: CARDIOLOGY CLINIC | Age: 77
End: 2022-03-10

## 2022-03-16 ENCOUNTER — TELEPHONE (OUTPATIENT)
Dept: CARDIOLOGY CLINIC | Age: 77
End: 2022-03-16

## 2022-03-16 NOTE — TELEPHONE ENCOUNTER
----- Message from Agnes Guardado MD sent at 3/14/2022  3:49 PM EDT -----  His LV function looks good. His echo looks okay.  ----- Message -----  From: Acacia Carlson RN  Sent: 3/7/2022   9:24 AM EDT  To: Agnes Guardado MD    Per your last note \" I have asked him to come back in 1 month to see my nurse practitioner with his BP diary and his home blood pressure machines. We will correlate his machines with our office. If he remains hypertensive, I would add clonidine 0.1 mg daily. With his numerous coronary risk factors and known recent history of right CEA, I have recommended proceeding on with an echocardiogram as well as pharmacologic nuclear scan. He is not able to ambulate; therefore, regular stress test cannot be done.

## 2022-03-18 PROBLEM — N18.30 CKD (CHRONIC KIDNEY DISEASE) STAGE 3, GFR 30-59 ML/MIN (HCC): Status: ACTIVE | Noted: 2017-05-05

## 2022-03-19 PROBLEM — E11.22 TYPE 2 DM WITH CKD STAGE 3 AND HYPERTENSION (HCC): Status: ACTIVE | Noted: 2017-05-05

## 2022-03-19 PROBLEM — N18.30 TYPE 2 DM WITH CKD STAGE 3 AND HYPERTENSION (HCC): Status: ACTIVE | Noted: 2017-05-05

## 2022-03-19 PROBLEM — E11.42 DIABETIC POLYNEUROPATHY ASSOCIATED WITH TYPE 2 DIABETES MELLITUS (HCC): Status: ACTIVE | Noted: 2017-05-05

## 2022-03-19 PROBLEM — I12.9 TYPE 2 DM WITH CKD STAGE 3 AND HYPERTENSION (HCC): Status: ACTIVE | Noted: 2017-05-05

## 2022-03-19 PROBLEM — M16.12 PRIMARY OSTEOARTHRITIS OF LEFT HIP: Status: ACTIVE | Noted: 2017-05-05

## 2022-03-20 PROBLEM — I67.9 CEREBRAL VASCULAR DISEASE: Status: ACTIVE | Noted: 2017-05-05

## 2022-03-20 PROBLEM — I65.23 BILATERAL CAROTID ARTERY STENOSIS: Status: ACTIVE | Noted: 2017-05-05

## 2022-03-21 RX ORDER — LANSOPRAZOLE 30 MG/1
30 CAPSULE, DELAYED RELEASE ORAL
Qty: 90 CAPSULE | Refills: 3 | Status: SHIPPED | OUTPATIENT
Start: 2022-03-21

## 2022-03-21 NOTE — TELEPHONE ENCOUNTER
Last Visit: 1/28/22 with MD Abdi  Next Appointment: 4/29/22 with MD Abdi  Previous Refill Encounter(s): 2/17/21 #90 with 3 refills    Requested Prescriptions     Pending Prescriptions Disp Refills    lansoprazole (Prevacid) 30 mg capsule 90 Capsule 3     Sig: Take 1 Capsule by mouth Daily (before breakfast).

## 2022-03-22 ENCOUNTER — TELEPHONE (OUTPATIENT)
Dept: CARDIOLOGY CLINIC | Age: 77
End: 2022-03-22

## 2022-03-22 NOTE — TELEPHONE ENCOUNTER
Called pt to let them know that Dr. Kylah Khan MD read nuclear scan  test results and they as the following:  ----- Message from Kylah Khan MD sent at 3/14/2022  4:00 PM EDT -----  Let the patient know that his nuclear scan was low risk.     Pt had no additional questions

## 2022-04-15 ENCOUNTER — OFFICE VISIT (OUTPATIENT)
Dept: CARDIOLOGY CLINIC | Age: 77
End: 2022-04-15
Payer: COMMERCIAL

## 2022-04-15 VITALS
HEIGHT: 67 IN | SYSTOLIC BLOOD PRESSURE: 180 MMHG | BODY MASS INDEX: 30.92 KG/M2 | OXYGEN SATURATION: 98 % | HEART RATE: 87 BPM | DIASTOLIC BLOOD PRESSURE: 84 MMHG | WEIGHT: 197 LBS

## 2022-04-15 DIAGNOSIS — R06.02 SOB (SHORTNESS OF BREATH): Primary | ICD-10-CM

## 2022-04-15 DIAGNOSIS — N18.30 TYPE 2 DM WITH CKD STAGE 3 AND HYPERTENSION (HCC): ICD-10-CM

## 2022-04-15 DIAGNOSIS — I65.23 BILATERAL CAROTID ARTERY STENOSIS: ICD-10-CM

## 2022-04-15 DIAGNOSIS — E11.22 TYPE 2 DM WITH CKD STAGE 3 AND HYPERTENSION (HCC): ICD-10-CM

## 2022-04-15 DIAGNOSIS — I12.9 TYPE 2 DM WITH CKD STAGE 3 AND HYPERTENSION (HCC): ICD-10-CM

## 2022-04-15 DIAGNOSIS — E78.2 MIXED HYPERLIPIDEMIA: ICD-10-CM

## 2022-04-15 DIAGNOSIS — I10 ESSENTIAL HYPERTENSION: ICD-10-CM

## 2022-04-15 DIAGNOSIS — I67.9 CEREBRAL VASCULAR DISEASE: ICD-10-CM

## 2022-04-15 PROCEDURE — 93000 ELECTROCARDIOGRAM COMPLETE: CPT | Performed by: INTERNAL MEDICINE

## 2022-04-15 PROCEDURE — 99214 OFFICE O/P EST MOD 30 MIN: CPT | Performed by: INTERNAL MEDICINE

## 2022-04-15 PROCEDURE — 3046F HEMOGLOBIN A1C LEVEL >9.0%: CPT | Performed by: INTERNAL MEDICINE

## 2022-04-15 NOTE — PROGRESS NOTES
Monica Carrillo. presents today for   Chief Complaint   Patient presents with    Follow-up     3 month       Monica Carrillo. preferred language for health care discussion is english/other. Is someone accompanying this pt? no    Is the patient using any DME equipment during [address]? no    Depression Screening:  3 most recent PHQ Screens 4/15/2022   Little interest or pleasure in doing things Not at all   Feeling down, depressed, irritable, or hopeless Not at all   Total Score PHQ 2 0       Learning Assessment:  Learning Assessment 4/15/2022   PRIMARY LEARNER Patient   BARRIERS PRIMARY LEARNER -   CO-LEARNER CAREGIVER -   PRIMARY LANGUAGE ENGLISH   LEARNER PREFERENCE PRIMARY DEMONSTRATION   ANSWERED BY patient   RELATIONSHIP SELF       Abuse Screening:  Abuse Screening Questionnaire 4/15/2022   Do you ever feel afraid of your partner? N   Are you in a relationship with someone who physically or mentally threatens you? N   Is it safe for you to go home? Y       Fall Risk  Fall Risk Assessment, last 12 mths 4/15/2022   Able to walk? Yes   Fall in past 12 months? 0   Do you feel unsteady? 0   Are you worried about falling 0           Pt currently taking Anticoagulant therapy? no    Pt currently taking Antiplatelet therapy ? Aspirin 81 mg daily      Coordination of Care:  1. Have you been to the ER, urgent care clinic since your last visit? Hospitalized since your last visit? no    2. Have you seen or consulted any other health care providers outside of the [address] [place] [address] since your last visit? Include any pap smears or colon screening.  no

## 2022-04-15 NOTE — PROGRESS NOTES
HISTORY OF PRESENT ILLNESS  Cesar Corona is a 68 y.o. male. ASSESSMENT and PLAN    Mr. Maeve Chapin has no documented history of CAD. However, he does have documented history of ICA disease. Around October 2021, he presented with left hand numbness to [facility]. He was then noted to have occluded left ICA and severely diseased right ICA. He had right CEA performed during that hospitalization. He also has a longstanding history. He denies previous history of tobacco use. He has history of IDDM. He has history of dyslipidemia on Lipitor. He did have nuclear scan performed in March 2022 as well as an echocardiogram.  The nuclear scan showed EF greater than 70%, without significant reversible perfusion defect. It was felt to be low risk. His echocardiogram revealed EF 55-60%. No significant regional wall motion normality was noted. · CAD:    He has no documented history of CAD. He does have history of PVD, status post right CEA. · BP:    Elevated in the office. However, his home diary is quite well controlled. He brought his machine in the last time and had it correlated with matched. Of asked him to bring his machine in as well as his diary on all of his visits here. · Rhythm:    Stable sinus at 87 bpm..  · CHF:    There is no evidence of decompensated CHF noted. · Weight:     His weight today is 197 pounds. This is at his baseline. · Cholesterol:   Target LDL <70. Lipitor 40. · Anti-platelet:   Remains on ASA, and Plavix. I have asked him to come back in 3 month to see my nurse practitioner with his BP diary and his home blood pressure machines. We will correlate his machines with our office again. If he remains hypertensive, I would add clonidine 0.1 mg daily. I will see him back in 6 months. Thank you. Encounter Diagnoses   Name Primary?     SOB (shortness of breath) Yes    Essential hypertension     Mixed hyperlipidemia     Type 2 DM with CKD stage 3 and hypertension ([facility] [address]     Bilateral carotid artery stenosis     Cerebral vascular disease      current treatment plan is effective, no change in therapy  lab results and schedule of future lab studies reviewed with patient  reviewed diet, exercise and weight control  cardiovascular risk and specific lipid/LDL goals reviewed  use of aspirin to prevent MI and TIA's discussed      HPI   Today, Mr. Sharla Curiel has no complaints of chest pains, increased shortness of breath or decreased exercise capacity. He brought his blood pressure diary from home. It is quite well controlled with his systolic blood pressure ranging between 100-135 mmHg. He has had his home blood pressure cuff correlated with our office without significant difference. He denies any orthopnea or PND. He denies any palpitations or dizziness. Despite his age, he remains active physically. However, he is limited by his diabetes and hypoglycemic episodes. Review of Systems   Respiratory: Negative for shortness of breath. Cardiovascular: Negative for chest pain, palpitations, orthopnea, claudication, leg swelling and PND. All other systems reviewed and are negative. Physical Exam  Vitals and nursing note reviewed. HENT:      Head: Normocephalic. Eyes:      Conjunctiva/sclera: Conjunctivae normal.   Neck:      Vascular: No carotid bruit. Cardiovascular:      Rate and Rhythm: Normal rate and regular rhythm. Pulmonary:      Breath sounds: Normal breath sounds. Abdominal:      Palpations: Abdomen is soft. Musculoskeletal:         General: No swelling. Cervical back: No rigidity. Skin:     General: Skin is warm and dry. Neurological:      General: No focal deficit present. Mental Status: He is alert and oriented to person, place, and time.    Psychiatric:         Mood and Affect: Mood normal.         Behavior: Behavior normal.         PCP: Krystal Mo MD    Past Medical History:   Diagnosis Date    Cerebral artery occlusion with cerebral infarction ([facility] [address]     Diabetes mellitus     HTN (hypertension)     Unspecified hyperplasia of prostate with urinary obstruction and other lower urinary tract symptoms (LUTS)        Past Surgical History:   Procedure Laterality Date    HX THROMBOENDARTECTOMY Right 10/20/2021    THROMBOENDARTERECTOMY, CAROTID;  Surgeon: Neymar Singh MD    HX UROLOGICAL      Prostate Biopsy    OK LASER VAPORIZATION SURGERY PROSTATE, COMPLETE         Current Outpatient Medications   Medication Sig Dispense Refill    lansoprazole (Prevacid) 30 mg capsule Take 1 Capsule by mouth Daily (before breakfast). 90 Capsule 3    amLODIPine (NORVASC) 5 mg tablet Take 1 Tablet by mouth daily. 90 Tablet 3    lisinopriL (PRINIVIL, ZESTRIL) 40 mg tablet Take 0.5 Tablets by mouth daily. 1 Tablet 0    insulin glargine (LANTUS,BASAGLAR) 100 unit/mL (3 mL) inpn 38 Units by SubCUTAneous route nightly. 1 Pen 0    insulin lispro (HUMALOG) 100 unit/mL kwikpen 10 to 20 units BID with meals (as needed) 1 Adjustable Dose Pre-filled Pen Syringe 0    cholecalciferol (Vitamin D3) (5000 Units/125 mcg) tab tablet Take 1 Tablet by mouth daily. 1 Tablet 0    acetaminophen (TYLENOL) 500 mg tablet Take 1 Tablet by mouth every six (6) hours as needed for Pain. 1 Tablet 0    aspirin delayed-release 81 mg tablet Take 1 Tablet by mouth daily. 1 Tablet 0    multivitamins-minerals-lutein (Centrum Silver) tab tablet Take 1 Tablet by mouth daily. 1 Tablet 0    albuterol (PROVENTIL HFA, VENTOLIN HFA, PROAIR HFA) 90 mcg/actuation inhaler Take 2 Puffs by inhalation every four (4) hours as needed for Wheezing. 3 Each 3    diphenoxylate-atropine (LOMOTIL) 2.5-0.025 mg per tablet Take 1 Tablet by mouth four (4) times daily as needed for Diarrhea. Max Daily Amount: 4 Tablets. 30 Tablet 0    OTHER Prevagen 1 tab daily      hydroCHLOROthiazide (MICROZIDE) 12.5 mg capsule Take 1 Capsule by mouth daily.  90 Capsule 3    fluticasone propion-salmeteroL (ADVAIR/WIXELA) 250-50 mcg/dose diskus inhaler Take 1 Puff by inhalation every twelve (12) hours. 3 Each 3    clopidogreL (PLAVIX) 75 mg tab Take 75 mg by mouth daily.  flash glucose scanning reader ([employer] Jordy 14 Day [place]) misc by Does Not Apply route.  atorvastatin (LIPITOR) 40 mg tablet TAKE 1 TABLET DAILY (Patient taking differently: 20 mg.) 90 Tablet 3    carvediloL (Coreg) 25 mg tablet Take 1 Tab by mouth two (2) times a day. 180 Tab 3    glucose blood VI test strips (ONETOUCH ULTRA TEST) strip 50 Each.  glucose blood VI test strips (ASCENSIA CONTOUR) strip       Insulin Needles, Disposable, (AUBREY PEN NEEDLE) 32 x 5/32 \" ndle 1 each.  Lancets misc 1 each.  sildenafil citrate (VIAGRA) 100 mg tablet Take 1 Tab by mouth as needed. 6 Tab 10       The patient has a family history of    Social History     Tobacco Use    Smoking status: Never Smoker    Smokeless tobacco: Never Used   Vaping Use    Vaping Use: Never used   Substance Use Topics    Alcohol use: No    Drug use: Not on file       Lab Results   Component Value Date/Time    Cholesterol, total 107 01/03/2022 09:20 AM    HDL Cholesterol 43 01/03/2022 09:20 AM    LDL, calculated 45 01/03/2022 09:20 AM    LDL, calculated 70 01/14/2021 10:12 AM    Triglyceride 102 01/03/2022 09:20 AM    CHOL/HDL Ratio 2.5 01/14/2021 10:12 AM        BP Readings from Last 3 Encounters:   04/15/22 (!) 180/84   03/11/22 (!) 160/70   03/07/22 (!) 150/76        Pulse Readings from Last 3 Encounters:   04/15/22 87   01/28/22 64   01/27/22 64       Wt Readings from Last 3 Encounters:   04/15/22 89.4 kg (197 lb)   03/11/22 90.3 kg (199 lb)   03/07/22 90.3 kg (199 lb)         EKG: Unchanged from previous, normal sinus rhythm with late precordial R wave transition.

## 2022-04-19 LAB
ALBUMIN SERPL-MCNC: 4 G/DL (ref 3.7–4.7)
ALBUMIN/CREAT UR: 302 MG/G CREAT (ref 0–29)
ALBUMIN/GLOB SERPL: 1.5 {RATIO} (ref 1.2–2.2)
ALP SERPL-CCNC: 71 IU/L (ref 44–121)
ALT SERPL-CCNC: 16 IU/L (ref 0–44)
AST SERPL-CCNC: 15 IU/L (ref 0–40)
BASOPHILS # BLD AUTO: 0 X10E3/UL (ref 0–0.2)
BASOPHILS NFR BLD AUTO: 1 %
BILIRUB SERPL-MCNC: <0.2 MG/DL (ref 0–1.2)
BUN SERPL-MCNC: 26 MG/DL (ref 8–27)
BUN/CREAT SERPL: 16 (ref 10–24)
CALCIUM SERPL-MCNC: 9.4 MG/DL (ref 8.6–10.2)
CHLORIDE SERPL-SCNC: 106 MMOL/L (ref 96–106)
CHOLEST SERPL-MCNC: 107 MG/DL (ref 100–199)
CO2 SERPL-SCNC: 20 MMOL/L (ref 20–29)
CREAT SERPL-MCNC: 1.58 MG/DL (ref 0.76–1.27)
CREAT UR-MCNC: 61.3 MG/DL
EGFR: 45 ML/MIN/1.73
EOSINOPHIL # BLD AUTO: 0 X10E3/UL (ref 0–0.4)
EOSINOPHIL NFR BLD AUTO: 1 %
ERYTHROCYTE [DISTWIDTH] IN BLOOD BY AUTOMATED COUNT: 16.6 % (ref 11.6–15.4)
GLOBULIN SER CALC-MCNC: 2.6 G/DL (ref 1.5–4.5)
GLUCOSE SERPL-MCNC: 187 MG/DL (ref 65–99)
HBA1C MFR BLD: 9.5 % (ref 4.8–5.6)
HCT VFR BLD AUTO: 25.1 % (ref 37.5–51)
HDLC SERPL-MCNC: 41 MG/DL
HGB BLD-MCNC: 7.6 G/DL (ref 13–17.7)
IMM GRANULOCYTES # BLD AUTO: 0 X10E3/UL (ref 0–0.1)
IMM GRANULOCYTES NFR BLD AUTO: 0 %
IMP & REVIEW OF LAB RESULTS: NORMAL
INTERPRETATION: NORMAL
LDLC SERPL CALC-MCNC: 46 MG/DL (ref 0–99)
LYMPHOCYTES # BLD AUTO: 1 X10E3/UL (ref 0.7–3.1)
LYMPHOCYTES NFR BLD AUTO: 25 %
MCH RBC QN AUTO: 23.5 PG (ref 26.6–33)
MCHC RBC AUTO-ENTMCNC: 30.3 G/DL (ref 31.5–35.7)
MCV RBC AUTO: 78 FL (ref 79–97)
MICROALBUMIN UR-MCNC: 185.1 UG/ML
MONOCYTES # BLD AUTO: 0.5 X10E3/UL (ref 0.1–0.9)
MONOCYTES NFR BLD AUTO: 12 %
NEUTROPHILS # BLD AUTO: 2.5 X10E3/UL (ref 1.4–7)
NEUTROPHILS NFR BLD AUTO: 61 %
PLATELET # BLD AUTO: 330 X10E3/UL (ref 150–450)
POTASSIUM SERPL-SCNC: 4.8 MMOL/L (ref 3.5–5.2)
PROT SERPL-MCNC: 6.6 G/DL (ref 6–8.5)
RBC # BLD AUTO: 3.23 X10E6/UL (ref 4.14–5.8)
SODIUM SERPL-SCNC: 139 MMOL/L (ref 134–144)
TRIGL SERPL-MCNC: 105 MG/DL (ref 0–149)
VLDLC SERPL CALC-MCNC: 20 MG/DL (ref 5–40)
WBC # BLD AUTO: 4.1 X10E3/UL (ref 3.4–10.8)

## 2022-04-22 ENCOUNTER — TELEPHONE (OUTPATIENT)
Dept: INTERNAL MEDICINE CLINIC | Age: 77
End: 2022-04-22

## 2022-04-22 DIAGNOSIS — R05.9 COUGH: Primary | ICD-10-CM

## 2022-04-22 RX ORDER — CODEINE PHOSPHATE AND GUAIFENESIN 10; 100 MG/5ML; MG/5ML
10 SOLUTION ORAL
Qty: 120 ML | Refills: 0 | Status: SHIPPED | OUTPATIENT
Start: 2022-04-22 | End: 2022-04-29

## 2022-04-22 NOTE — TELEPHONE ENCOUNTER
Pt calling says he needs cough medication with codeine in it for his cough. Says the otc is not working.

## 2022-04-29 ENCOUNTER — OFFICE VISIT (OUTPATIENT)
Dept: INTERNAL MEDICINE CLINIC | Age: 77
End: 2022-04-29
Payer: COMMERCIAL

## 2022-04-29 VITALS
HEIGHT: 67 IN | TEMPERATURE: 97.6 F | OXYGEN SATURATION: 97 % | WEIGHT: 198 LBS | BODY MASS INDEX: 31.08 KG/M2 | HEART RATE: 64 BPM | SYSTOLIC BLOOD PRESSURE: 143 MMHG | RESPIRATION RATE: 20 BRPM | DIASTOLIC BLOOD PRESSURE: 71 MMHG

## 2022-04-29 DIAGNOSIS — D50.0 IRON DEFICIENCY ANEMIA DUE TO CHRONIC BLOOD LOSS: ICD-10-CM

## 2022-04-29 DIAGNOSIS — Z79.4 TYPE 2 DIABETES MELLITUS WITH STAGE 3B CHRONIC KIDNEY DISEASE, WITH LONG-TERM CURRENT USE OF INSULIN (HCC): Primary | ICD-10-CM

## 2022-04-29 DIAGNOSIS — I10 PRIMARY HYPERTENSION: ICD-10-CM

## 2022-04-29 DIAGNOSIS — E78.2 MIXED HYPERLIPIDEMIA: ICD-10-CM

## 2022-04-29 DIAGNOSIS — I73.9 PVD (PERIPHERAL VASCULAR DISEASE) (HCC): ICD-10-CM

## 2022-04-29 DIAGNOSIS — R13.12 OROPHARYNGEAL DYSPHAGIA: ICD-10-CM

## 2022-04-29 DIAGNOSIS — N18.32 TYPE 2 DIABETES MELLITUS WITH STAGE 3B CHRONIC KIDNEY DISEASE, WITH LONG-TERM CURRENT USE OF INSULIN (HCC): Primary | ICD-10-CM

## 2022-04-29 DIAGNOSIS — E11.22 TYPE 2 DIABETES MELLITUS WITH STAGE 3B CHRONIC KIDNEY DISEASE, WITH LONG-TERM CURRENT USE OF INSULIN (HCC): Primary | ICD-10-CM

## 2022-04-29 PROCEDURE — 99214 OFFICE O/P EST MOD 30 MIN: CPT | Performed by: INTERNAL MEDICINE

## 2022-04-29 PROCEDURE — 3046F HEMOGLOBIN A1C LEVEL >9.0%: CPT | Performed by: INTERNAL MEDICINE

## 2022-04-29 NOTE — PROGRESS NOTES
Patient is in the office today for a 3 month follow up. Patient states he does not take his BP meds all the time because it drops his BP too low. 1. \"Have you been to the ER, urgent care clinic since your last visit? Hospitalized since your last visit? \" No    2. \"Have you seen or consulted any other health care providers outside of the [address] [place] [address] since your last visit? \" No     3. For patients aged 39-70: Has the patient had a colonoscopy / FIT/ Cologuard? NA - based on age      If the patient is female:    4. For patients aged 41-77: Has the patient had a mammogram within the past 2 years? NA - based on age or sex      11. For patients aged 21-65: Has the patient had a pap smear?  NA - based on age or sex

## 2022-04-29 NOTE — PATIENT INSTRUCTIONS
High Blood Pressure: Care Instructions  Overview     It's normal for blood pressure to go up and down throughout the day. But if it stays up, you have high blood pressure. Another name for high blood pressure is hypertension. Despite what a lot of people think, high blood pressure usually doesn't cause headaches or make you feel dizzy or lightheaded. It usually has no symptoms. But it does increase your risk of stroke, heart attack, and other problems. You and your doctor will talk about your risks of these problems based on your blood pressure. Your doctor will give you a goal for your blood pressure. Your goal will be based on your health and your age. Lifestyle changes, such as eating healthy and being active, are always important to help lower blood pressure. You might also take medicine to reach your blood pressure goal.  Follow-up care is a key part of your treatment and safety. Be sure to make and go to all appointments, and call your doctor if you are having problems. It's also a good idea to know your test results and keep a list of the medicines you take. How can you care for yourself at home? Medical treatment  · If you stop taking your medicine, your blood pressure will go back up. You may take one or more types of medicine to lower your blood pressure. Be safe with medicines. Take your medicine exactly as prescribed. Call your doctor if you think you are having a problem with your medicine. · Talk to your doctor before you start taking aspirin every day. Aspirin can help certain people lower their risk of a heart attack or stroke. But taking aspirin isn't right for everyone, because it can cause serious bleeding. · See your doctor regularly. You may need to see the doctor more often at first or until your blood pressure comes down. · If you are taking blood pressure medicine, talk to your doctor before you take decongestants or anti-inflammatory medicine, such as ibuprofen.  Some of these medicines can raise blood pressure. · Learn how to check your blood pressure at home. Lifestyle changes  · Stay at a healthy weight. This is especially important if you put on weight around the waist. Losing even 10 pounds can help you lower your blood pressure. · If your doctor recommends it, get more exercise. Walking is a good choice. Bit by bit, increase the amount you walk every day. Try for at least 30 minutes on most days of the week. You also may want to swim, bike, or do other activities. · Avoid or limit alcohol. Talk to your doctor about whether you can drink any alcohol. · Try to limit how much sodium you eat to less than 2,300 milligrams (mg) a day. Your doctor may ask you to try to eat less than 1,500 mg a day. · Eat plenty of fruits (such as bananas and oranges), vegetables, legumes, whole grains, and low-fat dairy products. · Lower the amount of saturated fat in your diet. Saturated fat is found in animal products such as milk, cheese, and meat. Limiting these foods may help you lose weight and also lower your risk for heart disease. · Do not smoke. Smoking increases your risk for heart attack and stroke. If you need help quitting, talk to your doctor about stop-smoking programs and medicines. These can increase your chances of quitting for good. When should you call for help? Call 911  anytime you think you may need emergency care. This may mean having symptoms that suggest that your blood pressure is causing a serious heart or blood vessel problem. Your blood pressure may be over 180/120. For example, call 911 if:    · You have symptoms of a heart attack. These may include:  ? Chest pain or pressure, or a strange feeling in the chest.  ? Sweating. ? Shortness of breath. ? Nausea or vomiting. ? Pain, pressure, or a strange feeling in the back, neck, jaw, or upper belly or in one or both shoulders or arms. ? Lightheadedness or sudden weakness.   ? A fast or irregular heartbeat.     · You have symptoms of a stroke. These may include:  ? Sudden numbness, tingling, weakness, or loss of movement in your face, arm, or leg, especially on only one side of your body. ? Sudden vision changes. ? Sudden trouble speaking. ? Sudden confusion or trouble understanding simple statements. ? Sudden problems with walking or balance. ? A sudden, severe headache that is different from past headaches.     · You have severe back or belly pain. Do not wait until your blood pressure comes down on its own. Get help right away. Call your doctor now or seek immediate care if:    · Your blood pressure is much higher than normal (such as 180/120 or higher), but you don't have symptoms.     · You think high blood pressure is causing symptoms, such as:  ? Severe headache.  ? Blurry vision. Watch closely for changes in your health, and be sure to contact your doctor if:    · Your blood pressure measures higher than your doctor recommends at least 2 times. That means the top number is higher or the bottom number is higher, or both.     · You think you may be having side effects from your blood pressure medicine. Where can you learn more? Go to http://www.gray.com/  Enter [id number] in the search box to learn more about \"High Blood Pressure: Care Instructions. \"  Current as of: January 10, 2022               Content Version: 13.2  © 2006-2022 [employer] Care instructions adapted under license by [employer] (which disclaims liability or warranty for this information). If you have questions about a medical condition or this instruction, always ask your healthcare professional. [place] [address] any warranty or liability for your use of this information.

## 2022-05-05 NOTE — PROGRESS NOTES
Subjective:       Chief Complaint  The patient presents for follow up of diabetes, hypertension and high cholesterol. JOAO Stephenson is a 68 y.o. male seen for follow up of diabetes. Rosa has hypertension and hyperlipidemia. Diabetes uncontrolled, on Lantus insulin 40 units daily and Humalog 15 units BID with meals, pt is followed by Endocrine, Dr Rothman [facility] office, patient has had some low blood sugars by taking his Humalog insulin after his meal he has been advised to make sure he takes it before his meal and to probably limited to 10 units or less. hypertension  uncontrolled,  on lisinopril 40 mg and coreg 25 mg BD, and HCTZ 25 mg daily, patient brings in his blood pressure readings which average about 877T systolic. Patient does have a history of chronic kidney disease for which he is seeing nephrology, patient misses his blood pressure medicines at times because he is concerned about having low blood pressures,  hyperlipidemia no significant medication side effects noted, well controlled, on Lipitor 40 mg. . He has h/o CVA and recent right carotid endarterectomy. Diet and Lifestyle: generally follows a low fat low cholesterol diet, does not rigorously follow a diabetic diet, exercises sporadically    Home BP Monitoring: is controlled at home,    Diabetic Review of Systems - home glucose monitoring: is performed regularly, 3x/day. Other symptoms and concerns: Patient has a history of asbestosis exposure. Consider chest x-ray follow-up in the near future. Discussed the patient's BMI with him. The BMI follow up plan is as follows: I have counseled this patient on diet and exercise regimens. [place] in the past.  It has been over 5 years. Cologuard done June 2021 was negative. At age 76 no further testing needed unless patient has symptoms. Patient has a history of asthma and is doing fairly well on Advair.     Patient has a history of TIAs/CVA in the past and has a completely occluded left carotid artery. This was noted in 2015. Patient to continue on aspirin 81 mg daily and statin. As mentioned above patient had recent right carotid endarterectomy after he had recent CVA. Patient continues on vitamin D 5000 units/day for his vitamin D deficiency    Patient has anemia that could be from his chronic kidney disease. He is complaining of issues with dysphagia so we will go ahead and refer to GI also for evaluation. Will refer to hematology for further evaluation  Current Outpatient Medications   Medication Sig    lansoprazole (Prevacid) 30 mg capsule Take 1 Capsule by mouth Daily (before breakfast).  lisinopriL (PRINIVIL, ZESTRIL) 40 mg tablet Take 0.5 Tablets by mouth daily.  insulin glargine (LANTUS,BASAGLAR) 100 unit/mL (3 mL) inpn 38 Units by SubCUTAneous route nightly.  insulin lispro (HUMALOG) 100 unit/mL kwikpen 10 to 20 units BID with meals (as needed)    cholecalciferol (Vitamin D3) (5000 Units/125 mcg) tab tablet Take 1 Tablet by mouth daily.  acetaminophen (TYLENOL) 500 mg tablet Take 1 Tablet by mouth every six (6) hours as needed for Pain.  aspirin delayed-release 81 mg tablet Take 1 Tablet by mouth daily.  multivitamins-minerals-lutein (Centrum Silver) tab tablet Take 1 Tablet by mouth daily.  albuterol (PROVENTIL HFA, VENTOLIN HFA, PROAIR HFA) 90 mcg/actuation inhaler Take 2 Puffs by inhalation every four (4) hours as needed for Wheezing.  diphenoxylate-atropine (LOMOTIL) 2.5-0.025 mg per tablet Take 1 Tablet by mouth four (4) times daily as needed for Diarrhea. Max Daily Amount: 4 Tablets.  OTHER Prevagen 1 tab daily    hydroCHLOROthiazide (MICROZIDE) 12.5 mg capsule Take 1 Capsule by mouth daily.  fluticasone propion-salmeteroL (ADVAIR/WIXELA) 250-50 mcg/dose diskus inhaler Take 1 Puff by inhalation every twelve (12) hours.  clopidogreL (PLAVIX) 75 mg tab Take 75 mg by mouth daily.     flash glucose scanning reader ([employer] Jordy 14 Day [place]) misc by Does Not Apply route.  atorvastatin (LIPITOR) 40 mg tablet TAKE 1 TABLET DAILY (Patient taking differently: 20 mg.)    glucose blood VI test strips (ONETOUCH ULTRA TEST) strip 50 Each.  glucose blood VI test strips (ASCENSIA CONTOUR) strip     Insulin Needles, Disposable, (AUBREY PEN NEEDLE) 32 x 5/32 \" ndle 1 each.  Lancets misc 1 each.  sildenafil citrate (VIAGRA) 100 mg tablet Take 1 Tab by mouth as needed.  amLODIPine (NORVASC) 5 mg tablet Take 1 Tablet by mouth daily. (Patient not taking: Reported on 4/29/2022)    carvediloL (Coreg) 25 mg tablet Take 1 Tab by mouth two (2) times a day. (Patient not taking: Reported on 4/29/2022)     No current facility-administered medications for this visit.              Review of Systems  Respiratory: negative for dyspnea on exertion  Cardiovascular: negative for chest pain    Objective:     Visit Vitals  BP (!) 143/71 (BP 1 Location: Left arm, BP Patient Position: Sitting, BP Cuff Size: Adult)   Pulse 64   Temp 97.6 °F (36.4 °C) (Temporal)   Resp 20   Ht 5' 7\" (1.702 m)   Wt 198 lb (89.8 kg)   SpO2 97%   BMI 31.01 kg/m²        Chest - clear to auscultation, no wheezes, rales or rhonchi, symmetric air entry  Heart - normal rate and regular rhythm, regular rhythm, systolic murmur 2/6 at 2nd left intercostal space  Extremities - pedal edema 1 +      Labs:   Lab Results   Component Value Date/Time    Cholesterol, total 107 04/18/2022 08:56 AM    HDL Cholesterol 41 04/18/2022 08:56 AM    LDL, calculated 46 04/18/2022 08:56 AM    LDL, calculated 70 01/14/2021 10:12 AM    Triglyceride 105 04/18/2022 08:56 AM    CHOL/HDL Ratio 2.5 01/14/2021 10:12 AM     Lab Results   Component Value Date/Time    Prostate Specific Ag 0.8 10/06/2020 09:10 AM    Prostate Specific Ag 0.8 07/06/2016 09:45 AM    Prostate Specific Ag 1.4 12/30/2015 10:30 AM     Lab Results   Component Value Date/Time    Sodium 139 04/18/2022 08:56 AM    Potassium 4.8 04/18/2022 08:56 AM    Chloride 106 04/18/2022 08:56 AM    CO2 20 04/18/2022 08:56 AM    Anion gap 6 01/14/2021 10:12 AM    Glucose 187 (H) 04/18/2022 08:56 AM    BUN 26 04/18/2022 08:56 AM    Creatinine 1.58 (H) 04/18/2022 08:56 AM    BUN/Creatinine ratio 16 04/18/2022 08:56 AM    GFR est AA 50 (L) 01/03/2022 09:20 AM    GFR est non-AA 43 (L) 01/03/2022 09:20 AM    Calcium 9.4 04/18/2022 08:56 AM    Bilirubin, total <0.2 04/18/2022 08:56 AM    ALT (SGPT) 16 04/18/2022 08:56 AM    Alk. phosphatase 71 04/18/2022 08:56 AM    Protein, total 6.6 04/18/2022 08:56 AM    Albumin 4.0 04/18/2022 08:56 AM    Globulin 3.3 01/14/2021 10:12 AM    A-G Ratio 1.5 04/18/2022 08:56 AM      Lab Results   Component Value Date/Time    Hemoglobin A1c 9.5 (H) 04/18/2022 08:56 AM            Assessment / Plan     Diabetes uncontrolled, on Lantus 40 units daily and Humalog 10-15 units 2 times daily with meals. Patient being followed by endocrine. Patient needs intensive follow-up with his diet and his regimen will refer to Pharm. D. if not improved by next office visit. In the meantime patient to take his Humalog insulin before his meals to avoid hypoglycemia  hypertension uncontrolled on Coreg and lisinopril  and  HCTZ 25 mg daily. Patient probably has some whitecoat hypertension but takes his medicines fairly irregularly in order to avoid low blood sugars  Hyperlipidemia well controlled, on Lipitor 40 mg. ICD-10-CM ICD-9-CM    1. Type 2 diabetes mellitus with stage 3b chronic kidney disease, with long-term current use of insulin ([facility])  E11.22 250.40 HEMOGLOBIN A1C W/O EAG    N18.32 585.3     Z79.4 V58.67    2. Mixed hyperlipidemia  E78.2 272.2 LIPID PANEL   3. Primary hypertension  F38 349.1 METABOLIC PANEL, COMPREHENSIVE   4.  Iron deficiency anemia due to chronic blood loss  D50.0 280.0 REFERRAL TO GASTROENTEROLOGY      REFERRAL TO HEMATOLOGY   5. PVD (peripheral vascular disease) (HCC)  I73.9 443.9 patient to follow-up with vascular surgery for further evaluation   6. Oropharyngeal dysphagia  R13.12 787.22 REFERRAL TO GASTROENTEROLOGY                Diabetic issues reviewed with him: diabetic diet discussed in detail, and low cholesterol diet, weight control and daily exercise discussed. Follow-up and Dispositions    · Return in about 4 months (around 8/29/2022) for labs 1 week before. Reviewed plan of care. Patient has provided input and agrees with goals.

## 2022-07-12 LAB
CREATININE, EXTERNAL: 1.19
HBA1C MFR BLD HPLC: 8.2 %

## 2022-08-04 RX ORDER — AMLODIPINE BESYLATE 5 MG/1
TABLET ORAL
Qty: 90 TABLET | Refills: 3 | Status: SHIPPED | OUTPATIENT
Start: 2022-08-04 | End: 2022-08-05 | Stop reason: SDUPTHER

## 2022-08-04 NOTE — TELEPHONE ENCOUNTER
This med shows d/c on 5/4/22 citing \"not a current med\". Patient states he is taking this. Please advise. Last Visit: 4/29/22 with MD Abdi  Next Appointment: 8/29/22 with MD Abdi      Requested Prescriptions     Pending Prescriptions Disp Refills    amLODIPine (NORVASC) 5 mg tablet 90 Tablet 3     Sig: Take 1 tablet by mouth once daily         For Pharmacy [facility] in place:   Recommendation Provided To:    Intervention Detail: New Rx: 1, reason: Patient Preference  Gap Closed?:   Intervention Accepted By:   Time Spent (min): 5

## 2022-08-04 NOTE — TELEPHONE ENCOUNTER
Pt says his bp med was changed to Amlodipine 5 mg     Need 90 day supply with refills    [facility]

## 2022-08-05 RX ORDER — AMLODIPINE BESYLATE 5 MG/1
TABLET ORAL
Qty: 90 TABLET | Refills: 3 | Status: SHIPPED | OUTPATIENT
Start: 2022-08-05

## 2022-08-05 NOTE — TELEPHONE ENCOUNTER
Patient called, states the prescription was sent [facility] but he would [facility]. He is requesting for that to be changed.   Please advise, thank you

## 2022-08-23 LAB
BASOPHILS # BLD AUTO: 0 X10E3/UL (ref 0–0.2)
BASOPHILS NFR BLD AUTO: 1 %
EOSINOPHIL # BLD AUTO: 0.1 X10E3/UL (ref 0–0.4)
EOSINOPHIL NFR BLD AUTO: 2 %
ERYTHROCYTE [DISTWIDTH] IN BLOOD BY AUTOMATED COUNT: 19.7 % (ref 11.6–15.4)
HCT VFR BLD AUTO: 28.2 % (ref 37.5–51)
HGB BLD-MCNC: 8.4 G/DL (ref 13–17.7)
IMM GRANULOCYTES # BLD AUTO: 0 X10E3/UL (ref 0–0.1)
IMM GRANULOCYTES NFR BLD AUTO: 0 %
LYMPHOCYTES # BLD AUTO: 1.3 X10E3/UL (ref 0.7–3.1)
LYMPHOCYTES NFR BLD AUTO: 31 %
MCH RBC QN AUTO: 23.5 PG (ref 26.6–33)
MCHC RBC AUTO-ENTMCNC: 29.8 G/DL (ref 31.5–35.7)
MCV RBC AUTO: 79 FL (ref 79–97)
MONOCYTES # BLD AUTO: 0.5 X10E3/UL (ref 0.1–0.9)
MONOCYTES NFR BLD AUTO: 12 %
NEUTROPHILS # BLD AUTO: 2.3 X10E3/UL (ref 1.4–7)
NEUTROPHILS NFR BLD AUTO: 54 %
PLATELET # BLD AUTO: 305 X10E3/UL (ref 150–450)
RBC # BLD AUTO: 3.58 X10E6/UL (ref 4.14–5.8)
WBC # BLD AUTO: 4.3 X10E3/UL (ref 3.4–10.8)

## 2022-08-29 ENCOUNTER — OFFICE VISIT (OUTPATIENT)
Dept: INTERNAL MEDICINE CLINIC | Age: 77
End: 2022-08-29
Payer: COMMERCIAL

## 2022-08-29 VITALS
DIASTOLIC BLOOD PRESSURE: 71 MMHG | HEIGHT: 67 IN | SYSTOLIC BLOOD PRESSURE: 156 MMHG | OXYGEN SATURATION: 98 % | WEIGHT: 193 LBS | BODY MASS INDEX: 30.29 KG/M2 | RESPIRATION RATE: 20 BRPM | TEMPERATURE: 98.1 F | HEART RATE: 65 BPM

## 2022-08-29 DIAGNOSIS — E78.2 MIXED HYPERLIPIDEMIA: ICD-10-CM

## 2022-08-29 DIAGNOSIS — D50.0 IRON DEFICIENCY ANEMIA DUE TO CHRONIC BLOOD LOSS: ICD-10-CM

## 2022-08-29 DIAGNOSIS — I73.9 PVD (PERIPHERAL VASCULAR DISEASE) (HCC): ICD-10-CM

## 2022-08-29 DIAGNOSIS — N18.32 TYPE 2 DIABETES MELLITUS WITH STAGE 3B CHRONIC KIDNEY DISEASE, WITH LONG-TERM CURRENT USE OF INSULIN (HCC): Primary | ICD-10-CM

## 2022-08-29 DIAGNOSIS — I10 PRIMARY HYPERTENSION: ICD-10-CM

## 2022-08-29 DIAGNOSIS — R13.14 PHARYNGOESOPHAGEAL DYSPHAGIA: ICD-10-CM

## 2022-08-29 DIAGNOSIS — Z79.4 TYPE 2 DIABETES MELLITUS WITH STAGE 3B CHRONIC KIDNEY DISEASE, WITH LONG-TERM CURRENT USE OF INSULIN (HCC): Primary | ICD-10-CM

## 2022-08-29 DIAGNOSIS — E11.22 TYPE 2 DIABETES MELLITUS WITH STAGE 3B CHRONIC KIDNEY DISEASE, WITH LONG-TERM CURRENT USE OF INSULIN (HCC): Primary | ICD-10-CM

## 2022-08-29 PROCEDURE — 3046F HEMOGLOBIN A1C LEVEL >9.0%: CPT | Performed by: INTERNAL MEDICINE

## 2022-08-29 PROCEDURE — 1123F ACP DISCUSS/DSCN MKR DOCD: CPT | Performed by: INTERNAL MEDICINE

## 2022-08-29 PROCEDURE — 99214 OFFICE O/P EST MOD 30 MIN: CPT | Performed by: INTERNAL MEDICINE

## 2022-08-29 NOTE — PROGRESS NOTES
Patient is in the office today for a 4 month follow up. Do you have an Advance Directive no  Do you want more information : information given     1. \"Have you been to the ER, urgent care clinic since your last visit? Hospitalized since your last visit? \" No    2. \"Have you seen or consulted any other health care providers outside of the [address] [place] [address] since your last visit? \" No     3. For patients aged 39-70: Has the patient had a colonoscopy / FIT/ Cologuard? NA - based on age      If the patient is female:    4. For patients aged 41-77: Has the patient had a mammogram within the past 2 years? NA - based on age or sex      11. For patients aged 21-65: Has the patient had a pap smear?  NA - based on age or sex

## 2022-08-30 ENCOUNTER — TELEPHONE (OUTPATIENT)
Dept: INTERNAL MEDICINE CLINIC | Age: 77
End: 2022-08-30

## 2022-08-30 NOTE — TELEPHONE ENCOUNTER
----- Message from Stephanie Duran MD sent at 8/29/2022  8:25 PM EDT -----  Try to get recent labs from Dr Abran Luna office

## 2022-08-30 NOTE — PROGRESS NOTES
Subjective:       Chief Complaint  The patient presents for follow up of diabetes, hypertension and high cholesterol. JOAO Larkin is a 68 y.o. male seen for follow up of diabetes. Rosa has hypertension and hyperlipidemia. Diabetes status unknown on Lantus insulin 38 units daily and Humalog 10-20 units BID with meals, pt is followed by Endocrine, Dr Keerthi Kirkpatrick office, will copy of recent labs done. hypertension  uncontrolled,  on lisinopril 20 mg and coreg 12.5 mg BD, and Norvasc 10 mg and HCTZ,  patient brings in his blood pressure readings which average about [id number] systolic. Patient does have a history of chronic kidney disease for which he is seeing nephrology, patient misses his blood pressure medicines at times because he is concerned about having low blood pressures, he strongly advised with his granddaughter who is with him to take medications consistently as opposed to taking 1 dose 1 day and then a higher dose the next day, hyperlipidemia no significant medication side effects noted,  control unknown on  Lipitor 40 mg. . He has h/o CVA and right carotid endarterectomy. Would like his LDL to be less than 70 but patient does not take this medication consistently. Diet and Lifestyle: generally follows a low fat low cholesterol diet, does not rigorously follow a diabetic diet, exercises sporadically    Home BP Monitoring: is controlled at home,    Diabetic Review of Systems - home glucose monitoring: is performed regularly, 3x/day. Other symptoms and concerns: Patient has a history of asbestosis exposure. Consider chest x-ray follow-up in the near future. Discussed the patient's BMI with him. The BMI follow up plan is as follows: I have counseled this patient on diet and exercise regimens. [place].  It has been over 5 years. Cologuard done June 2021 was negative.   At age 76 no further testing needed unless patient has symptoms. Patient has a history of asthma and is doing fairly well on Advair. Patient has a history of TIAs/CVA  in the past and has a completely occluded left carotid artery. This was noted in 2015. Patient to continue on aspirin 81 mg daily and statin. As mentioned above patient had recent right carotid endarterectomy after he had recent CVA. Patient continues on vitamin D 5000 units/day for his vitamin D deficiency    Patient has chronic anemia which is probably multifactorial and needs to follow-up with hematology for further management. It appears that he was sensitive to all IV iron infusion and could not finish it. Patient has dysphagia and probably needs EGD with dilation. He needs to be off of Plavix for 5 days. Since he had a stroke in October 2021 would be okay to hold Plavix for 5 days at this time. Current Outpatient Medications   Medication Sig    amLODIPine (NORVASC) 5 mg tablet Take 1 tablet by mouth once daily    lansoprazole (Prevacid) 30 mg capsule Take 1 Capsule by mouth Daily (before breakfast). lisinopriL (PRINIVIL, ZESTRIL) 40 mg tablet Take 0.5 Tablets by mouth daily. insulin glargine (LANTUS,BASAGLAR) 100 unit/mL (3 mL) inpn 38 Units by SubCUTAneous route nightly. insulin lispro (HUMALOG) 100 unit/mL kwikpen 10 to 20 units BID with meals (as needed)    cholecalciferol (Vitamin D3) (5000 Units/125 mcg) tab tablet Take 1 Tablet by mouth daily. acetaminophen (TYLENOL) 500 mg tablet Take 1 Tablet by mouth every six (6) hours as needed for Pain. aspirin delayed-release 81 mg tablet Take 1 Tablet by mouth daily. multivitamins-minerals-lutein (Centrum Silver) tab tablet Take 1 Tablet by mouth daily. albuterol (PROVENTIL HFA, VENTOLIN HFA, PROAIR HFA) 90 mcg/actuation inhaler Take 2 Puffs by inhalation every four (4) hours as needed for Wheezing. OTHER Prevagen 1 tab daily    hydroCHLOROthiazide (MICROZIDE) 12.5 mg capsule Take 1 Capsule by mouth daily. fluticasone propion-salmeteroL (ADVAIR/WIXELA) 250-50 mcg/dose diskus inhaler Take 1 Puff by inhalation every twelve (12) hours. clopidogreL (PLAVIX) 75 mg tab Take 75 mg by mouth daily. flash glucose scanning reader (FreeStyle Jordy 14 Day [place]) misc by Does Not Apply route. atorvastatin (LIPITOR) 40 mg tablet TAKE 1 TABLET DAILY (Patient taking differently: 20 mg.)    glucose blood VI test strips strip 50 Each.    glucose blood VI test strips strip     Insulin Needles, Disposable, 32 gauge x 5/32\" ndle 1 each. Lancets misc 1 each. sildenafil citrate (VIAGRA) 100 mg tablet Take 1 Tab by mouth as needed. diphenoxylate-atropine (LOMOTIL) 2.5-0.025 mg per tablet Take 1 Tablet by mouth four (4) times daily as needed for Diarrhea. Max Daily Amount: 4 Tablets. (Patient not taking: Reported on 8/29/2022)    carvediloL (Coreg) 25 mg tablet Take 1 Tab by mouth two (2) times a day. (Patient not taking: No sig reported)     No current facility-administered medications for this visit.              Review of Systems  Respiratory: negative for dyspnea on exertion  Cardiovascular: negative for chest pain    Objective:     Visit Vitals  BP (!) 156/71 (BP 1 Location: Left arm, BP Patient Position: Sitting, BP Cuff Size: Adult)   Pulse 65   Temp 98.1 °F (36.7 °C) (Temporal)   Resp 20   Ht 5' 7\" (1.702 m)   Wt 193 lb (87.5 kg)   SpO2 98%   BMI 30.23 kg/m²        Chest - clear to auscultation, no wheezes, rales or rhonchi, symmetric air entry  Heart - normal rate and regular rhythm, regular rhythm, systolic murmur 2/6 at 2nd left intercostal space  Extremities - pedal edema 1 +      Labs:   Lab Results   Component Value Date/Time    Cholesterol, total 107 04/18/2022 08:56 AM    HDL Cholesterol 41 04/18/2022 08:56 AM    LDL, calculated 46 04/18/2022 08:56 AM    LDL, calculated 70 01/14/2021 10:12 AM    Triglyceride 105 04/18/2022 08:56 AM    CHOL/HDL Ratio 2.5 01/14/2021 10:12 AM     Lab Results   Component Value Date/Time    Prostate Specific Ag 0.8 10/06/2020 09:10 AM    Prostate Specific Ag 0.8 07/06/2016 09:45 AM    Prostate Specific Ag 1.4 12/30/2015 10:30 AM     Lab Results   Component Value Date/Time    Sodium 139 04/18/2022 08:56 AM    Potassium 4.8 04/18/2022 08:56 AM    Chloride 106 04/18/2022 08:56 AM    CO2 20 04/18/2022 08:56 AM    Anion gap 6 01/14/2021 10:12 AM    Glucose 187 (H) 04/18/2022 08:56 AM    BUN 26 04/18/2022 08:56 AM    Creatinine 1.58 (H) 04/18/2022 08:56 AM    BUN/Creatinine ratio 16 04/18/2022 08:56 AM    GFR est AA 50 (L) 01/03/2022 09:20 AM    GFR est non-AA 43 (L) 01/03/2022 09:20 AM    Calcium 9.4 04/18/2022 08:56 AM    Bilirubin, total <0.2 04/18/2022 08:56 AM    ALT (SGPT) 16 04/18/2022 08:56 AM    Alk. phosphatase 71 04/18/2022 08:56 AM    Protein, total 6.6 04/18/2022 08:56 AM    Albumin 4.0 04/18/2022 08:56 AM    Globulin 3.3 01/14/2021 10:12 AM    A-G Ratio 1.5 04/18/2022 08:56 AM      Lab Results   Component Value Date/Time    Hemoglobin A1c 9.5 (H) 04/18/2022 08:56 AM            Assessment / Plan     Diabetes control uncertain, on Lantus 38 units daily and Humalog 10-20 units 2 times daily with meals. Patient being followed by endocrine. hypertension uncontrolled on Coreg and lisinopril  and  HCTZ 25 mg daily. Patient probably has some whitecoat hypertension but takes his medicines fairly irregularly in order to avoid low blood pressures. Hyperlipidemia control uncertain on on Lipitor 40 mg. Patient has not been consistently taking this medication either. We will get copy of labs from Dr. Addy Sellers office    ICD-10-CM ICD-9-CM    1. Type 2 diabetes mellitus with stage 3b chronic kidney disease, with long-term current use of insulin (HCC)  E11.22 250.40 HEMOGLOBIN A1C W/O EAG    N18.32 585.3     Z79.4 V58.67       2. Mixed hyperlipidemia  E78.2 272.2 LIPID PANEL      3. Primary hypertension  M15 619.6 METABOLIC PANEL, COMPREHENSIVE      4.  Iron deficiency anemia due to chronic blood loss  D50.0 280.0 Patient to follow-up with hematology for further management      5. PVD (peripheral vascular disease) (HCC)  I73.9 443.9 Patient continues on Plavix and statin and will follow up with vascular surgery      6. Pharyngoesophageal dysphagia  R13.14 787.24 REFERRAL TO GASTROENTEROLOGY for EGD which should be able to be done since its been almost a year since patient has had a CVA and can stop Plavix for 5 days                   Diabetic issues reviewed with him: diabetic diet discussed in detail, and low cholesterol diet, weight control and daily exercise discussed. Follow-up and Dispositions    Return in about 3 months (around 11/29/2022) for labs 1 week before. Reviewed plan of care. Patient has provided input and agrees with goals.

## 2022-09-21 RX ORDER — ALBUTEROL SULFATE 90 UG/1
2 AEROSOL, METERED RESPIRATORY (INHALATION)
Qty: 3 EACH | Refills: 3 | Status: SHIPPED | OUTPATIENT
Start: 2022-09-21

## 2022-09-21 NOTE — TELEPHONE ENCOUNTER
Last Visit: 8/29/22 with MD Abdi  Next Appointment: 11/29/22 with MD Abdi  Previous Refill Encounter(s): 1/11/22 #3 with 3 refills    Requested Prescriptions     Pending Prescriptions Disp Refills    albuterol (PROVENTIL HFA, VENTOLIN HFA, PROAIR HFA) 90 mcg/actuation inhaler 3 Each 3     Sig: Take 2 Puffs by inhalation every four (4) hours as needed for Wheezing. For [facility] in place:   Recommendation Provided To:    Intervention Detail: New Rx: 1, reason: Patient Preference  Gap Closed?:   Intervention Accepted By:   Time Spent (min): 5

## 2022-10-04 DIAGNOSIS — I10 PRIMARY HYPERTENSION: ICD-10-CM

## 2022-10-07 ENCOUNTER — TELEPHONE (OUTPATIENT)
Dept: INTERNAL MEDICINE CLINIC | Age: 77
End: 2022-10-07

## 2022-10-07 RX ORDER — ATORVASTATIN CALCIUM 40 MG/1
40 TABLET, FILM COATED ORAL DAILY
Qty: 90 TABLET | Refills: 3 | Status: SHIPPED | OUTPATIENT
Start: 2022-10-07

## 2022-10-07 RX ORDER — HYDROCHLOROTHIAZIDE 12.5 MG/1
12.5 CAPSULE ORAL DAILY
Qty: 90 CAPSULE | Refills: 3 | Status: SHIPPED | OUTPATIENT
Start: 2022-10-07

## 2022-10-07 NOTE — TELEPHONE ENCOUNTER
Patient called and states his blood pressure this morning was 88/41. He took it again a little while ago and it was 92/45. He is extremely concerned and would like to speak with Dr. Isabel Haile or a nurse as soon as possible. He can be reached at [phone].   Please advise, thank you

## 2022-10-07 NOTE — TELEPHONE ENCOUNTER
Pt aware to hold on BP meds for today. So he will not take lisinopril or coreg. He will hold Norvasc and only take Coreg tomorrow.   If his BP begins to rise he will restart lisinopril

## 2022-11-22 LAB
ALBUMIN SERPL-MCNC: 3.8 G/DL (ref 3.7–4.7)
ALBUMIN/GLOB SERPL: 1.7 {RATIO} (ref 1.2–2.2)
ALP SERPL-CCNC: 74 IU/L (ref 44–121)
ALT SERPL-CCNC: 23 IU/L (ref 0–44)
AST SERPL-CCNC: 23 IU/L (ref 0–40)
BILIRUB SERPL-MCNC: 0.5 MG/DL (ref 0–1.2)
BUN SERPL-MCNC: 20 MG/DL (ref 8–27)
BUN/CREAT SERPL: 14 (ref 10–24)
CALCIUM SERPL-MCNC: 9.4 MG/DL (ref 8.6–10.2)
CHLORIDE SERPL-SCNC: 102 MMOL/L (ref 96–106)
CHOLEST SERPL-MCNC: 117 MG/DL (ref 100–199)
CO2 SERPL-SCNC: 22 MMOL/L (ref 20–29)
CREAT SERPL-MCNC: 1.41 MG/DL (ref 0.76–1.27)
EGFR: 51 ML/MIN/1.73
GLOBULIN SER CALC-MCNC: 2.3 G/DL (ref 1.5–4.5)
GLUCOSE SERPL-MCNC: 271 MG/DL (ref 70–99)
HBA1C MFR BLD: 8.9 % (ref 4.8–5.6)
HDLC SERPL-MCNC: 50 MG/DL
IMP & REVIEW OF LAB RESULTS: NORMAL
INTERPRETATION: NORMAL
LDLC SERPL CALC-MCNC: 48 MG/DL (ref 0–99)
POTASSIUM SERPL-SCNC: 4.5 MMOL/L (ref 3.5–5.2)
PROT SERPL-MCNC: 6.1 G/DL (ref 6–8.5)
SODIUM SERPL-SCNC: 139 MMOL/L (ref 134–144)
TRIGL SERPL-MCNC: 101 MG/DL (ref 0–149)
VLDLC SERPL CALC-MCNC: 19 MG/DL (ref 5–40)

## 2022-11-28 RX ORDER — LANSOPRAZOLE 30 MG/1
30 CAPSULE, DELAYED RELEASE ORAL
Qty: 90 CAPSULE | Refills: 3 | Status: SHIPPED | OUTPATIENT
Start: 2022-11-28

## 2022-11-28 NOTE — TELEPHONE ENCOUNTER
Requested Prescriptions     Pending Prescriptions Disp Refills    lansoprazole (Prevacid) 30 mg capsule 90 Capsule 3     Sig: Take 1 Capsule by mouth Daily (before breakfast).

## 2022-11-29 ENCOUNTER — TELEPHONE (OUTPATIENT)
Dept: INTERNAL MEDICINE CLINIC | Age: 77
End: 2022-11-29

## 2022-11-29 ENCOUNTER — OFFICE VISIT (OUTPATIENT)
Dept: INTERNAL MEDICINE CLINIC | Age: 77
End: 2022-11-29
Payer: COMMERCIAL

## 2022-11-29 VITALS
TEMPERATURE: 98.5 F | HEART RATE: 68 BPM | DIASTOLIC BLOOD PRESSURE: 69 MMHG | BODY MASS INDEX: 29.51 KG/M2 | HEIGHT: 67 IN | SYSTOLIC BLOOD PRESSURE: 153 MMHG | OXYGEN SATURATION: 99 % | WEIGHT: 188 LBS | RESPIRATION RATE: 20 BRPM

## 2022-11-29 DIAGNOSIS — E11.22 TYPE 2 DIABETES MELLITUS WITH STAGE 3B CHRONIC KIDNEY DISEASE, WITH LONG-TERM CURRENT USE OF INSULIN (HCC): Primary | ICD-10-CM

## 2022-11-29 DIAGNOSIS — N18.32 TYPE 2 DIABETES MELLITUS WITH STAGE 3B CHRONIC KIDNEY DISEASE, WITH LONG-TERM CURRENT USE OF INSULIN (HCC): Primary | ICD-10-CM

## 2022-11-29 DIAGNOSIS — I10 PRIMARY HYPERTENSION: ICD-10-CM

## 2022-11-29 DIAGNOSIS — E78.2 MIXED HYPERLIPIDEMIA: ICD-10-CM

## 2022-11-29 DIAGNOSIS — D50.0 IRON DEFICIENCY ANEMIA DUE TO CHRONIC BLOOD LOSS: ICD-10-CM

## 2022-11-29 DIAGNOSIS — Z79.4 TYPE 2 DIABETES MELLITUS WITH STAGE 3B CHRONIC KIDNEY DISEASE, WITH LONG-TERM CURRENT USE OF INSULIN (HCC): Primary | ICD-10-CM

## 2022-11-29 RX ORDER — METFORMIN HYDROCHLORIDE 500 MG/1
TABLET ORAL
COMMUNITY

## 2022-11-29 RX ORDER — PIOGLITAZONEHYDROCHLORIDE 30 MG/1
TABLET ORAL
COMMUNITY
Start: 2022-11-29

## 2022-11-29 NOTE — PROGRESS NOTES
Patient is in the office today for a 3 month follow up. Do you have an Advance Directive no   Do you want more information : information given     1. \"Have you been to the ER, urgent care clinic since your last visit? Hospitalized since your last visit? \" No    2. \"Have you seen or consulted any other health care providers outside of the [address] [place] [address] since your last visit? \"  Yes, Dr. Lexie Rowe. ENT at [facility]. 3. For patients aged 39-70: Has the patient had a colonoscopy / FIT/ Cologuard? NA - based on age      If the patient is female:    4. For patients aged 41-77: Has the patient had a mammogram within the past 2 years? NA - based on age or sex      11. For patients aged 21-65: Has the patient had a pap smear?  NA - based on age or sex

## 2022-11-29 NOTE — TELEPHONE ENCOUNTER
Patient called and states he had an appt today and was supposed to call back with the name of the insulin he has been taking.  He states it is semglee (insulin glargine-yfgn)

## 2022-11-29 NOTE — TELEPHONE ENCOUNTER
Needs labs orders in [facility]  for 3 mo follow up scheduled for 02/27/23. Pt goes offsite     Please advise and I will mail them to pt.

## 2022-11-29 NOTE — PROGRESS NOTES
Subjective:       Chief Complaint  The patient presents for follow up of diabetes, hypertension and high cholesterol. JOAO Moses. is a 68 y.o. male seen for follow up of diabetes. Rosa has hypertension and hyperlipidemia. Diabetes status uncontrolled on Semglee (glargine) insulin 50 units daily and Humalog 10  units BID with meals, pt is followed by Endocrine, Dr Savage Mooring office, he was started on Actos 30 mg, Renal also placed him on Farxiga 10 mg due to CKD stage 3, hypertension  uncontrolled,  on lisinopril 20 mg and coreg 12.5 mg BD, and Norvasc 10 mg and HCTZ,  patient brings in his blood pressure readings which average about 130's/50's at home. Patient is not always compliant with taking his medicines consistently, hyperlipidemia no significant medication side effects noted,  controlled on  Lipitor 40 mg. . He has h/o CVA and right carotid endarterectomy. Diet and Lifestyle: generally follows a low fat low cholesterol diet, does not rigorously follow a diabetic diet, exercises sporadically    Home BP Monitoring: is controlled at home,    Diabetic Review of Systems - home glucose monitoring: is performed regularly, 3x/day. Other symptoms and concerns: Patient has a history of asbestosis exposure. Consider chest x-ray follow-up in the near future. Discussed the patient's BMI with him. The BMI follow up plan is as follows: I have counseled this patient on diet and exercise regimens. [place] in the [place].  It has been over 5 years. Cologuard done June 2021 was negative. At age 76 no further testing needed unless patient has symptoms. Patient has a history of asthma and is doing fairly well on Advair. Patient has a history of TIAs/CVA  in the past and has a completely occluded left carotid artery. This was noted in 2015. Patient to continue on aspirin 81 mg daily and statin.   As mentioned above patient had recent right carotid endarterectomy after he had recent CVA. Patient continues on vitamin D 5000 units/day for his vitamin D deficiency    Patient has chronic anemia which is probably multifactorial and will follow-up with hematology for further management. I        Current Outpatient Medications   Medication Sig    dapagliflozin propanediol (FARXIGA PO) Take  by mouth.    pioglitazone (ACTOS) 30 mg tablet     lansoprazole (Prevacid) 30 mg capsule Take 1 Capsule by mouth Daily (before breakfast). atorvastatin (LIPITOR) 40 mg tablet Take 1 Tablet by mouth daily. albuterol (PROVENTIL HFA, VENTOLIN HFA, PROAIR HFA) 90 mcg/actuation inhaler Take 2 Puffs by inhalation every four (4) hours as needed for Wheezing. amLODIPine (NORVASC) 5 mg tablet Take 1 tablet by mouth once daily    lisinopriL (PRINIVIL, ZESTRIL) 40 mg tablet Take 0.5 Tablets by mouth daily. insulin lispro (HUMALOG) 100 unit/mL kwikpen 10 to 20 units BID with meals (as needed)    cholecalciferol (Vitamin D3) (5000 Units/125 mcg) tab tablet Take 1 Tablet by mouth daily. acetaminophen (TYLENOL) 500 mg tablet Take 1 Tablet by mouth every six (6) hours as needed for Pain. aspirin delayed-release 81 mg tablet Take 1 Tablet by mouth daily. multivitamins-minerals-lutein (Centrum Silver) tab tablet Take 1 Tablet by mouth daily. OTHER Prevagen 1 tab daily    fluticasone propion-salmeteroL (ADVAIR/WIXELA) 250-50 mcg/dose diskus inhaler Take 1 Puff by inhalation every twelve (12) hours. clopidogreL (PLAVIX) 75 mg tab Take 75 mg by mouth daily. flash glucose scanning reader (FreeStyle Jordy 14 Day [place]) misc by Does Not Apply route. glucose blood VI test strips strip 50 Each.    glucose blood VI test strips strip     Insulin Needles, Disposable, 32 gauge x 5/32\" ndle 1 each. Lancets misc 1 each. sildenafil citrate (VIAGRA) 100 mg tablet Take 1 Tab by mouth as needed.     metFORMIN (GLUCOPHAGE) 500 mg tablet Prescribed by Dr. Rachel Pedraza    insulin glargine-yfgn (SEMGLEE,INSULIN GLARG-YFGN,PEN SC) by SubCUTAneous route. Prescribed by Dr. Salvador Gutierrez    hydroCHLOROthiazide (MICROZIDE) 12.5 mg capsule Take 1 Capsule by mouth daily. (Patient not taking: Reported on 11/29/2022)    diphenoxylate-atropine (LOMOTIL) 2.5-0.025 mg per tablet Take 1 Tablet by mouth four (4) times daily as needed for Diarrhea. Max Daily Amount: 4 Tablets. (Patient not taking: No sig reported)    carvediloL (Coreg) 25 mg tablet Take 1 Tab by mouth two (2) times a day. (Patient not taking: No sig reported)     No current facility-administered medications for this visit. Review of Systems  Respiratory: negative for dyspnea on exertion  Cardiovascular: negative for chest pain    Objective:     Visit Vitals  BP (!) 153/69 (BP 1 Location: Left arm, BP Patient Position: Sitting, BP Cuff Size: Adult) Comment (BP 1 Location): unable to use right arm due to freestyle bisi device.    Pulse 68   Temp 98.5 °F (36.9 °C) (Temporal)   Resp 20   Ht 5' 7\" (1.702 m)   Wt 188 lb (85.3 kg)   SpO2 99%   BMI 29.44 kg/m²        Chest - clear to auscultation, no wheezes, rales or rhonchi, symmetric air entry  Heart - normal rate and regular rhythm, regular rhythm, systolic murmur 2/6 at 2nd left intercostal space  Extremities - pedal edema 1 +      Labs:   Lab Results   Component Value Date/Time    Cholesterol, total 117 11/21/2022 10:32 AM    HDL Cholesterol 50 11/21/2022 10:32 AM    LDL, calculated 48 11/21/2022 10:32 AM    LDL, calculated 70 01/14/2021 10:12 AM    Triglyceride 101 11/21/2022 10:32 AM    CHOL/HDL Ratio 2.5 01/14/2021 10:12 AM     Lab Results   Component Value Date/Time    Prostate Specific Ag 0.8 10/06/2020 09:10 AM    Prostate Specific Ag 0.8 07/06/2016 09:45 AM    Prostate Specific Ag 1.4 12/30/2015 10:30 AM     Lab Results   Component Value Date/Time    Sodium 139 11/21/2022 10:32 AM    Potassium 4.5 11/21/2022 10:32 AM    Chloride 102 11/21/2022 10:32 AM    CO2 22 11/21/2022 10:32 AM    Anion gap 6 01/14/2021 10:12 AM    Glucose 271 (H) 11/21/2022 10:32 AM    BUN 20 11/21/2022 10:32 AM    Creatinine 1.41 (H) 11/21/2022 10:32 AM    BUN/Creatinine ratio 14 11/21/2022 10:32 AM    GFR est AA 50 (L) 01/03/2022 09:20 AM    GFR est non-AA 43 (L) 01/03/2022 09:20 AM    Calcium 9.4 11/21/2022 10:32 AM    Bilirubin, total 0.5 11/21/2022 10:32 AM    ALT (SGPT) 23 11/21/2022 10:32 AM    Alk. phosphatase 74 11/21/2022 10:32 AM    Protein, total 6.1 11/21/2022 10:32 AM    Albumin 3.8 11/21/2022 10:32 AM    Globulin 3.3 01/14/2021 10:12 AM    A-G Ratio 1.7 11/21/2022 10:32 AM      Lab Results   Component Value Date/Time    Hemoglobin A1c 8.9 (H) 11/21/2022 10:32 AM    Hemoglobin A1c, External 8.2 07/12/2022 12:00 AM            Assessment / Plan     Diabetes uncontrolled, on glargine insulin 50 units daily and Humalog 10-units 2 times daily with meals. Patient being followed by endocrine. He is now on Actos 30 mg daily and Farxiga 10 mg daily. Will monitor for any lower extremity edema with Actos  hypertension uncontrolled on Coreg and lisinopril  and  HCTZ 25 mg daily. Patient probably has some whitecoat hypertension but takes his medicines fairly irregularly in order to avoid low blood pressures. Hyperlipidemia well controlled on Lipitor 40 mg daily    ICD-10-CM ICD-9-CM    1. Type 2 diabetes mellitus with stage 3b chronic kidney disease, with long-term current use of insulin ([facility])  E11.22 250.40 HEMOGLOBIN A1C W/O EAG    N18.32 585.3     Z79.4 V58.67       2. Mixed hyperlipidemia  E78.2 272.2 LIPID PANEL      3. Primary hypertension  P91 682.4 METABOLIC PANEL, COMPREHENSIVE                       Diabetic issues reviewed with him: diabetic diet discussed in detail, and low cholesterol diet, weight control and daily exercise discussed. Follow-up and Dispositions    Return in about 3 months (around 2/28/2023) for labs 1 week before. Reviewed plan of care.  Patient has provided input and agrees with goals.

## 2022-12-06 ENCOUNTER — OFFICE VISIT (OUTPATIENT)
Dept: CARDIOLOGY CLINIC | Age: 77
End: 2022-12-06
Payer: COMMERCIAL

## 2022-12-06 VITALS
BODY MASS INDEX: 29.35 KG/M2 | HEIGHT: 67 IN | OXYGEN SATURATION: 97 % | HEART RATE: 78 BPM | WEIGHT: 187 LBS | SYSTOLIC BLOOD PRESSURE: 132 MMHG | DIASTOLIC BLOOD PRESSURE: 74 MMHG

## 2022-12-06 DIAGNOSIS — R06.02 SOB (SHORTNESS OF BREATH): Primary | ICD-10-CM

## 2022-12-06 DIAGNOSIS — E78.2 MIXED HYPERLIPIDEMIA: ICD-10-CM

## 2022-12-06 DIAGNOSIS — I10 ESSENTIAL HYPERTENSION: ICD-10-CM

## 2022-12-06 DIAGNOSIS — I67.9 CEREBRAL VASCULAR DISEASE: ICD-10-CM

## 2022-12-06 DIAGNOSIS — I65.23 BILATERAL CAROTID ARTERY STENOSIS: ICD-10-CM

## 2022-12-06 PROCEDURE — 3074F SYST BP LT 130 MM HG: CPT | Performed by: INTERNAL MEDICINE

## 2022-12-06 PROCEDURE — 1123F ACP DISCUSS/DSCN MKR DOCD: CPT | Performed by: INTERNAL MEDICINE

## 2022-12-06 PROCEDURE — 3078F DIAST BP <80 MM HG: CPT | Performed by: INTERNAL MEDICINE

## 2022-12-06 PROCEDURE — 99214 OFFICE O/P EST MOD 30 MIN: CPT | Performed by: INTERNAL MEDICINE

## 2022-12-06 PROCEDURE — 93000 ELECTROCARDIOGRAM COMPLETE: CPT | Performed by: INTERNAL MEDICINE

## 2022-12-06 NOTE — PROGRESS NOTES
HISTORY OF PRESENT ILLNESS  Neelam Urias is a 68 y.o. male. ASSESSMENT and PLAN    Mr. Paula Nguyen has no documented history of CAD. However, he does have documented history of ICA disease. Around October 2021, he presented with left hand numbness to [facility]. He was then noted to have occluded left ICA and severely diseased right ICA. He had right CEA performed during that hospitalization. He also has a longstanding history. He denies previous history of tobacco use. He has history of IDDM. He has history of dyslipidemia on Lipitor. He did have nuclear scan performed in March 2022 as well as an echocardiogram.  The nuclear scan showed EF greater than 70%, without significant reversible perfusion defect. It was felt to be low risk. His echocardiogram revealed EF 55-60%. No significant regional wall motion normality was noted. CAD:   He has no documented history of CAD. He does have history of PVD status post right CEA. BP:   Well controlled today at 132/74. We will continue his current regimen. Rhythm:   Stable sinus at 78 bpm.  CHF:    There is no evidence of decompensated CHF noted. Weight:    His weight today is 187 pounds. His baseline weight is 197 pounds. His current weight is excellent. This is likely part of the reason why his blood pressure is much improved. Cholesterol:   Target LDL <70. Lipitor 40. Anti-platelet:   Remains on ASA, and Plavix. I will see him back in 6 months. Thank you. Encounter Diagnoses   Name Primary?     SOB (shortness of breath) Yes    Essential hypertension     Mixed hyperlipidemia     Cerebral vascular disease     Bilateral carotid artery stenosis      current treatment plan is effective, no change in therapy  lab results and schedule of future lab studies reviewed with patient  reviewed diet, exercise and weight control  cardiovascular risk and specific lipid/LDL goals reviewed  use of aspirin to prevent MI and TIA's discussed    Follow-up  Pertinent negatives include no chest pain and no shortness of breath. Today, Mr. Harini Arreola has no complaints of chest pains, increased shortness of breath, or decreased exercise capacity. He remains active physically. He states that he lost weight by cutting back on his oral intake and exercising more. He denies any changes in his exercise capacity. He denies any orthopnea or PND. He denies any palpitations or dizziness. Review of Systems   Respiratory:  Negative for shortness of breath. Cardiovascular:  Negative for chest pain, palpitations, orthopnea, claudication, leg swelling and PND. All other systems reviewed and are negative. Physical Exam  Vitals and nursing note reviewed. Constitutional:       Appearance: Normal appearance. HENT:      Head: Normocephalic. Eyes:      Conjunctiva/sclera: Conjunctivae normal.   Cardiovascular:      Rate and Rhythm: Normal rate and regular rhythm. Pulmonary:      Breath sounds: Normal breath sounds. Abdominal:      Palpations: Abdomen is soft. Musculoskeletal:         General: No swelling. Cervical back: No rigidity. Skin:     General: Skin is warm and dry. Neurological:      General: No focal deficit present. Mental Status: He is alert and oriented to person, place, and time.    Psychiatric:         Mood and Affect: Mood normal.         Behavior: Behavior normal.       PCP: Courtney Moreira MD    Past Medical History:   Diagnosis Date    Cerebral artery occlusion with cerebral infarction ([facility] [address]     Diabetes mellitus     HTN (hypertension)     Unspecified hyperplasia of prostate with urinary obstruction and other lower urinary tract symptoms (LUTS)        Past Surgical History:   Procedure Laterality Date    HX THROMBOENDARTECTOMY Right 10/20/2021    THROMBOENDARTERECTOMY, CAROTID;  Surgeon: Nino Puga MD    HX UROLOGICAL      Prostate Biopsy    CA LASER VAPORIZATION SURGERY PROSTATE, COMPLETE Current Outpatient Medications   Medication Sig Dispense Refill    metFORMIN (GLUCOPHAGE) 500 mg tablet Prescribed by Dr. Jemma Lanza      dapagliflozin propanediol (FARXIGA PO) Take  by mouth.  pioglitazone (ACTOS) 30 mg tablet       insulin glargine-yfgn (SEMGLEE,INSULIN GLARG-YFGN,PEN SC) by SubCUTAneous route. Prescribed by Dr. Jose Luis Zheng lansoprazole (Prevacid) 30 mg capsule Take 1 Capsule by mouth Daily (before breakfast). 90 Capsule 3    atorvastatin (LIPITOR) 40 mg tablet Take 1 Tablet by mouth daily. 90 Tablet 3    albuterol (PROVENTIL HFA, VENTOLIN HFA, PROAIR HFA) 90 mcg/actuation inhaler Take 2 Puffs by inhalation every four (4) hours as needed for Wheezing. 3 Each 3    amLODIPine (NORVASC) 5 mg tablet Take 1 tablet by mouth once daily 90 Tablet 3    lisinopriL (PRINIVIL, ZESTRIL) 40 mg tablet Take 0.5 Tablets by mouth daily. 1 Tablet 0    insulin lispro (HUMALOG) 100 unit/mL kwikpen 10 to 20 units BID with meals (as needed) 1 Adjustable Dose Pre-filled Pen Syringe 0    cholecalciferol (Vitamin D3) (5000 Units/125 mcg) tab tablet Take 1 Tablet by mouth daily. 1 Tablet 0    acetaminophen (TYLENOL) 500 mg tablet Take 1 Tablet by mouth every six (6) hours as needed for Pain. 1 Tablet 0    aspirin delayed-release 81 mg tablet Take 1 Tablet by mouth daily. 1 Tablet 0    multivitamins-minerals-lutein (Centrum Silver) tab tablet Take 1 Tablet by mouth daily. 1 Tablet 0    diphenoxylate-atropine (LOMOTIL) 2.5-0.025 mg per tablet Take 1 Tablet by mouth four (4) times daily as needed for Diarrhea. Max Daily Amount: 4 Tablets. 30 Tablet 0    OTHER Prevagen 1 tab daily      fluticasone propion-salmeteroL (ADVAIR/WIXELA) 250-50 mcg/dose diskus inhaler Take 1 Puff by inhalation every twelve (12) hours. 3 Each 3    clopidogreL (PLAVIX) 75 mg tab Take 75 mg by mouth daily.  flash glucose scanning reader ([employer] [address] [place]) misc by Does Not Apply route.       carvediloL (Coreg) 25 mg tablet Take 1 Tab by mouth two (2) times a day. 180 Tab 3    glucose blood VI test strips strip 50 Each.  glucose blood VI test strips strip       Insulin Needles, Disposable, 32 gauge x 5/32\" ndle 1 each.  Lancets misc 1 each.  sildenafil citrate (VIAGRA) 100 mg tablet Take 1 Tab by mouth as needed. 6 Tab 10    hydroCHLOROthiazide (MICROZIDE) 12.5 mg capsule Take 1 Capsule by mouth daily. (Patient not taking: No sig reported) 90 Capsule 3       The patient has a family history of    Social History     Tobacco Use    Smoking status: Never    Smokeless tobacco: Never   Vaping Use    Vaping Use: Never used   Substance Use Topics    Alcohol use: No       Lab Results   Component Value Date/Time    Cholesterol, total 117 11/21/2022 10:32 AM    HDL Cholesterol 50 11/21/2022 10:32 AM    LDL, calculated 48 11/21/2022 10:32 AM    LDL, calculated 70 01/14/2021 10:12 AM    Triglyceride 101 11/21/2022 10:32 AM    CHOL/HDL Ratio 2.5 01/14/2021 10:12 AM        BP Readings from Last 3 Encounters:   12/06/22 132/74   11/29/22 (!) 153/69   08/29/22 (!) 156/71        Pulse Readings from Last 3 Encounters:   12/06/22 78   11/29/22 68   08/29/22 65       Wt Readings from Last 3 Encounters:   12/06/22 84.8 kg (187 lb)   11/29/22 85.3 kg (188 lb)   08/29/22 87.5 kg (193 lb)         EKG: unchanged from previous tracings, sinus rhythm with dysrhythmia, nonspecific ST and T waves changes.

## 2022-12-06 NOTE — PROGRESS NOTES
Ghassan Roldan. presents today for   Chief Complaint   Patient presents with    Follow-up     Overdue 6 month follow up- no complaints         Ghassan Roldan. preferred language for health care discussion is english/other. Is someone accompanying this pt? no    Is the patient using any DME equipment during [address]? no    Depression Screening:  3 most recent PHQ Screens 12/6/2022   Little interest or pleasure in doing things Not at all   Feeling down, depressed, irritable, or hopeless Not at all   Total Score PHQ 2 0       Learning Assessment:  Learning Assessment 4/15/2022   PRIMARY LEARNER Patient   BARRIERS PRIMARY LEARNER -   CO-LEARNER CAREGIVER -   PRIMARY LANGUAGE ENGLISH   LEARNER PREFERENCE PRIMARY DEMONSTRATION   ANSWERED BY patient   RELATIONSHIP SELF       Abuse Screening:  Abuse Screening Questionnaire 12/6/2022   Do you ever feel afraid of your partner? N   Are you in a relationship with someone who physically or mentally threatens you? N   Is it safe for you to go home? Y       Fall Risk  Fall Risk Assessment, last 12 mths 12/6/2022   Able to walk? Yes   Fall in past 12 months? 0   Do you feel unsteady? 0   Are you worried about falling 0       Pt currently taking Anticoagulant therapy? no    Coordination of Care:  1. Have you been to the ER, urgent care clinic since your last visit? Hospitalized since your last visit? no    2. Have you seen or consulted any other health care providers outside of the [address] [place] [address] since your last visit? Include any pap smears or colon screening.  no

## 2022-12-27 ENCOUNTER — TELEPHONE (OUTPATIENT)
Dept: INTERNAL MEDICINE CLINIC | Age: 77
End: 2022-12-27

## 2022-12-27 NOTE — PERIOP NOTES
PRE-SURGICAL INSTRUCTIONS        Patient's Name:  Brittney Coleman. Today's Date:  12/27/2022      Surgery Date:  1/5/2023                Do NOT eat or drink anything, including candy, gum, or ice chips after midnight on 1/4/2023, unless you have specific instructions from your surgeon or anesthesia provider to do so. You may brush your teeth before coming to the hospital.  No smoking 24 hours prior to the day of surgery. No alcohol 24 hours prior to the day of surgery. No recreational drugs for one week prior to the day of surgery. Leave all valuables, including money/purse, at home. Remove all jewelry, nail polish, acrylic nails, and makeup (including mascara); no lotions powders, deodorant, or perfume/cologne/after shave on the skin. Follow instruction for Hibiclens washes and CHG wipes from surgeon's office. Glasses/contact lenses and dentures may be worn to the hospital.  They will be removed prior to surgery. Call your doctor if symptoms of a cold or illness develop within 24-48 hours prior to your surgery. 11.  If you are having an outpatient procedure, please make arrangements for a responsible ADULT TO [address] [place] [address] and stay with you for 24 hours after your surgery. 12. ONE VISITOR in the hospital at this time for outpatient procedures. Exceptions may be made for surgical admissions, per nursing unit guidelines      Special Instructions:      Bring list of CURRENT medications. Bring inhaler. Bring any pertinent legal medical records. Take these medications the morning of surgery with a sip of water:  Blood Pressure medication as directed by physician  Follow physician instructions about insulin. Follow physician instructions about stopping anticoagulants. Complete bowel prep per MD instructions. On the day of surgery, come in the main entrance of [facility]. Let the  at the desk know you are there for surgery.   A staff member will come escort you to the surgical area on the second floor. If you have any questions or concerns, please do not hesitate to call:     (Prior to the day of surgery) [facility] department:  [phone]   (Day of surgery) Pre-Op department:  [phone]    These surgical instructions were reviewed with patient during the PAT phone call.

## 2022-12-27 NOTE — TELEPHONE ENCOUNTER
Pt is having surgery at [facility] in [place] on 01/05/2023 -he is asking for a lisat of the current meds that he is taking .  Please advise

## 2023-01-03 RX ORDER — ALBUTEROL SULFATE 90 UG/1
2 AEROSOL, METERED RESPIRATORY (INHALATION)
Qty: 3 EACH | Refills: 3 | Status: SHIPPED | OUTPATIENT
Start: 2023-01-03

## 2023-01-03 NOTE — TELEPHONE ENCOUNTER
Patient stating [facility] won't refill the Albuterol because the directions say 2 puffs every 4 hours AS NEEDED. They told him to have Dr. Osiris Villeda rewrite it to say 2 puffs every 4 hours.

## 2023-01-04 ENCOUNTER — ANESTHESIA EVENT (OUTPATIENT)
Dept: ENDOSCOPY | Age: 78
End: 2023-01-04
Payer: COMMERCIAL

## 2023-01-05 ENCOUNTER — HOSPITAL ENCOUNTER (OUTPATIENT)
Age: 78
Setting detail: OUTPATIENT SURGERY
Discharge: HOME | End: 2023-01-05
Attending: STUDENT IN AN ORGANIZED HEALTH CARE EDUCATION/TRAINING PROGRAM | Admitting: STUDENT IN AN ORGANIZED HEALTH CARE EDUCATION/TRAINING PROGRAM
Payer: COMMERCIAL

## 2023-01-05 ENCOUNTER — ANESTHESIA (OUTPATIENT)
Dept: ENDOSCOPY | Age: 78
End: 2023-01-05
Payer: COMMERCIAL

## 2023-01-05 VITALS
WEIGHT: 190 LBS | SYSTOLIC BLOOD PRESSURE: 134 MMHG | TEMPERATURE: 98 F | HEIGHT: 67 IN | HEART RATE: 68 BPM | OXYGEN SATURATION: 99 % | RESPIRATION RATE: 16 BRPM | DIASTOLIC BLOOD PRESSURE: 56 MMHG | BODY MASS INDEX: 29.82 KG/M2

## 2023-01-05 LAB
GLUCOSE BLD STRIP.AUTO-MCNC: 135 MG/DL (ref 70–110)
GLUCOSE BLD STRIP.AUTO-MCNC: 170 MG/DL (ref 70–110)

## 2023-01-05 PROCEDURE — 2709999900 HC NON-CHARGEABLE SUPPLY: Performed by: STUDENT IN AN ORGANIZED HEALTH CARE EDUCATION/TRAINING PROGRAM

## 2023-01-05 PROCEDURE — 76060000032 HC ANESTHESIA 0.5 TO 1 HR: Performed by: STUDENT IN AN ORGANIZED HEALTH CARE EDUCATION/TRAINING PROGRAM

## 2023-01-05 PROCEDURE — 74011250636 HC RX REV CODE- 250/636: Performed by: NURSE ANESTHETIST, CERTIFIED REGISTERED

## 2023-01-05 PROCEDURE — 88305 TISSUE EXAM BY PATHOLOGIST: CPT

## 2023-01-05 PROCEDURE — 76040000007: Performed by: STUDENT IN AN ORGANIZED HEALTH CARE EDUCATION/TRAINING PROGRAM

## 2023-01-05 PROCEDURE — 74011636637 HC RX REV CODE- 636/637: Performed by: NURSE ANESTHETIST, CERTIFIED REGISTERED

## 2023-01-05 PROCEDURE — 77030013992 HC SNR POLYP ENDOSC BSC -B: Performed by: STUDENT IN AN ORGANIZED HEALTH CARE EDUCATION/TRAINING PROGRAM

## 2023-01-05 PROCEDURE — 77030008565 HC TBNG SUC IRR ERBE -B: Performed by: STUDENT IN AN ORGANIZED HEALTH CARE EDUCATION/TRAINING PROGRAM

## 2023-01-05 PROCEDURE — 82962 GLUCOSE BLOOD TEST: CPT

## 2023-01-05 PROCEDURE — 74011000250 HC RX REV CODE- 250: Performed by: NURSE ANESTHETIST, CERTIFIED REGISTERED

## 2023-01-05 RX ORDER — SODIUM CHLORIDE, SODIUM LACTATE, POTASSIUM CHLORIDE, CALCIUM CHLORIDE 600; 310; 30; 20 MG/100ML; MG/100ML; MG/100ML; MG/100ML
125 INJECTION, SOLUTION INTRAVENOUS CONTINUOUS
Status: CANCELLED | OUTPATIENT
Start: 2023-01-05

## 2023-01-05 RX ORDER — SODIUM CHLORIDE, SODIUM LACTATE, POTASSIUM CHLORIDE, CALCIUM CHLORIDE 600; 310; 30; 20 MG/100ML; MG/100ML; MG/100ML; MG/100ML
125 INJECTION, SOLUTION INTRAVENOUS CONTINUOUS
Status: DISCONTINUED | OUTPATIENT
Start: 2023-01-05 | End: 2023-01-05 | Stop reason: HOSPADM

## 2023-01-05 RX ORDER — IBUPROFEN 200 MG
4 TABLET ORAL AS NEEDED
Status: CANCELLED | OUTPATIENT
Start: 2023-01-05

## 2023-01-05 RX ORDER — PROPOFOL 10 MG/ML
INJECTION, EMULSION INTRAVENOUS AS NEEDED
Status: DISCONTINUED | OUTPATIENT
Start: 2023-01-05 | End: 2023-01-05 | Stop reason: HOSPADM

## 2023-01-05 RX ORDER — PROPOFOL 10 MG/ML
VIAL (ML) INTRAVENOUS
Status: DISCONTINUED | OUTPATIENT
Start: 2023-01-05 | End: 2023-01-05 | Stop reason: HOSPADM

## 2023-01-05 RX ORDER — ONDANSETRON 2 MG/ML
4 INJECTION INTRAMUSCULAR; INTRAVENOUS ONCE
Status: DISCONTINUED | OUTPATIENT
Start: 2023-01-05 | End: 2023-01-05 | Stop reason: HOSPADM

## 2023-01-05 RX ORDER — INSULIN LISPRO 100 [IU]/ML
INJECTION, SOLUTION INTRAVENOUS; SUBCUTANEOUS ONCE
Status: CANCELLED | OUTPATIENT
Start: 2023-01-05 | End: 2023-01-05

## 2023-01-05 RX ORDER — SODIUM CHLORIDE, SODIUM LACTATE, POTASSIUM CHLORIDE, CALCIUM CHLORIDE 600; 310; 30; 20 MG/100ML; MG/100ML; MG/100ML; MG/100ML
50 INJECTION, SOLUTION INTRAVENOUS CONTINUOUS
Status: DISCONTINUED | OUTPATIENT
Start: 2023-01-05 | End: 2023-01-05 | Stop reason: HOSPADM

## 2023-01-05 RX ORDER — INSULIN LISPRO 100 [IU]/ML
INJECTION, SOLUTION INTRAVENOUS; SUBCUTANEOUS ONCE
Status: COMPLETED | OUTPATIENT
Start: 2023-01-05 | End: 2023-01-05

## 2023-01-05 RX ORDER — INSULIN LISPRO 100 [IU]/ML
INJECTION, SOLUTION INTRAVENOUS; SUBCUTANEOUS ONCE
Status: DISCONTINUED | OUTPATIENT
Start: 2023-01-05 | End: 2023-01-05 | Stop reason: HOSPADM

## 2023-01-05 RX ORDER — LIDOCAINE HYDROCHLORIDE 20 MG/ML
INJECTION, SOLUTION EPIDURAL; INFILTRATION; INTRACAUDAL; PERINEURAL AS NEEDED
Status: DISCONTINUED | OUTPATIENT
Start: 2023-01-05 | End: 2023-01-05 | Stop reason: HOSPADM

## 2023-01-05 RX ORDER — DEXTROSE MONOHYDRATE 100 MG/ML
0-250 INJECTION, SOLUTION INTRAVENOUS AS NEEDED
Status: CANCELLED | OUTPATIENT
Start: 2023-01-05

## 2023-01-05 RX ORDER — IBUPROFEN 200 MG
4 TABLET ORAL AS NEEDED
Status: DISCONTINUED | OUTPATIENT
Start: 2023-01-05 | End: 2023-01-05 | Stop reason: HOSPADM

## 2023-01-05 RX ORDER — DEXTROSE MONOHYDRATE 100 MG/ML
0-250 INJECTION, SOLUTION INTRAVENOUS AS NEEDED
Status: DISCONTINUED | OUTPATIENT
Start: 2023-01-05 | End: 2023-01-05 | Stop reason: HOSPADM

## 2023-01-05 RX ORDER — ONDANSETRON 2 MG/ML
4 INJECTION INTRAMUSCULAR; INTRAVENOUS ONCE
Status: CANCELLED | OUTPATIENT
Start: 2023-01-05 | End: 2023-01-05

## 2023-01-05 RX ADMIN — PROPOFOL 50 MG: 10 INJECTION, EMULSION INTRAVENOUS at 11:07

## 2023-01-05 RX ADMIN — FAMOTIDINE 20 MG: 10 INJECTION, SOLUTION INTRAVENOUS at 10:47

## 2023-01-05 RX ADMIN — LIDOCAINE HYDROCHLORIDE 50 MG: 20 INJECTION, SOLUTION EPIDURAL; INFILTRATION; INTRACAUDAL; PERINEURAL at 11:07

## 2023-01-05 RX ADMIN — SODIUM CHLORIDE, POTASSIUM CHLORIDE, SODIUM LACTATE AND CALCIUM CHLORIDE 50 ML/HR: 600; 310; 30; 20 INJECTION, SOLUTION INTRAVENOUS at 10:49

## 2023-01-05 RX ADMIN — PROPOFOL 140 MCG/KG/MIN: 10 INJECTION, EMULSION INTRAVENOUS at 11:07

## 2023-01-05 RX ADMIN — Medication 3 UNITS: at 10:42

## 2023-01-05 NOTE — ANESTHESIA PREPROCEDURE EVALUATION
Relevant Problems   CARDIOVASCULAR   (+) Essential hypertension      GASTROINTESTINAL   (+) GERD (gastroesophageal reflux disease)      RENAL FAILURE   (+) CKD (chronic kidney disease) stage 3, GFR 30-59 ml/min (HCC)      ENDOCRINE   (+) Diabetes mellitus (HCC)   (+) Non morbid obesity due to excess calories   (+) Type 2 DM with CKD stage 3 and hypertension (HCC)   (+) Type 2 diabetes mellitus with diabetic nephropathy, with long-term current use of insulin (HCC)       Anesthetic History   No history of anesthetic complications            Review of Systems / Medical History  Patient summary reviewed and pertinent labs reviewed    Pulmonary  Within defined limits                 Neuro/Psych         TIA     Cardiovascular    Hypertension: well controlled              Exercise tolerance: >4 METS     GI/Hepatic/Renal     GERD: poorly controlled           Endo/Other    Diabetes: well controlled, type 2    Morbid obesity and arthritis     Other Findings              Physical Exam    Airway  Mallampati: II  TM Distance: 4 - 6 cm  Neck ROM: normal range of motion   Mouth opening: Normal     Cardiovascular  Regular rate and rhythm,  S1 and S2 normal,  no murmur, click, rub, or gallop             Dental    Dentition: Upper partial plate     Pulmonary  Breath sounds clear to auscultation               Abdominal  GI exam deferred       Other Findings            Anesthetic Plan    ASA: 3  Anesthesia type: MAC          Induction: Intravenous  Anesthetic plan and risks discussed with: Patient

## 2023-01-05 NOTE — H&P
Date of Surgery Update:  Stu Richards was seen and examined. History and physical has been reviewed. The patient has been examined.  There have been no significant clinical changes since the completion of the originally dated History and Physical.    Signed By: Ga Douglas MD     January 5, 2023 10:03 AM

## 2023-01-05 NOTE — ANESTHESIA POSTPROCEDURE EVALUATION
Procedure(s):  UPPER ENDOSCOPY with Bxs ,Polypectomy & Disruption of Esophagel ring  COLONOSCOPY WITH POLYPECTOMIES.     MAC    Anesthesia Post Evaluation      Multimodal analgesia: multimodal analgesia used between 6 hours prior to anesthesia start to PACU discharge  Patient location during evaluation: PACU  Patient participation: complete - patient participated  Level of consciousness: sleepy but conscious  Pain management: adequate  Airway patency: patent  Anesthetic complications: no  Cardiovascular status: acceptable  Respiratory status: acceptable  Hydration status: acceptable  Post anesthesia nausea and vomiting:  controlled  Final Post Anesthesia Temperature Assessment:  Normothermia (36.0-37.5 degrees C)      INITIAL Post-op Vital signs:   Vitals Value Taken Time   /56 01/05/23 1159   Temp 36.7 °C (98 °F) 01/05/23 1159   Pulse 68 01/05/23 1159   Resp 16 01/05/23 1159   SpO2 99 % 01/05/23 1159

## 2023-01-05 NOTE — DISCHARGE INSTRUCTIONS
Colon Polyps: Care Instructions  Your Care Instructions     Colon polyps are growths in the colon or the rectum. The cause of most colon polyps is not known, and most people who get them do not have any problems. But a certain kind can turn into cancer. For this reason, regular testing for colon polyps is important for people as they get older. It is also important for anyone who has an increased risk for colon cancer. Polyps are usually found through routine colon cancer screening tests. Although most colon polyps are not cancerous, they are usually removed and then tested for cancer. Screening for colon cancer saves lives because the cancer can usually be cured if it is caught early. If you have a polyp that is the type that can turn into cancer, you may need more tests to examine your entire colon. The doctor will remove any other polyps that are found, and you will be tested more often. Follow-up care is a key part of your treatment and safety. Be sure to make and go to all appointments, and call your doctor if you are having problems. It's also a good idea to know your test results and keep a list of the medicines you take. How can you care for yourself at home? Regular exams to look for colon polyps are the best way to prevent polyps from turning into colon cancer. These can include stool tests, sigmoidoscopy, colonoscopy, and CT colonography. Talk with your doctor about a testing schedule that is right for you. To prevent polyps  There is no home treatment that can prevent colon polyps. But these steps may help lower your risk for cancer. Stay active. Being active can help you get to and stay at a healthy weight. Try to exercise on most days of the week. Walking is a good choice. Eat well. Choose a variety of vegetables, fruits, legumes (such as peas and beans), fish, poultry, and whole grains. Do not smoke.  If you need help quitting, talk to your doctor about stop-smoking programs and medicines. These can increase your chances of quitting for good. If you drink alcohol, limit how much you drink. Limit alcohol to 2 drinks a day for men and 1 drink a day for women. When should you call for help? Call your doctor now or seek immediate medical care if:    You have severe belly pain. Your stools are maroon or very bloody. Watch closely for changes in your health, and be sure to contact your doctor if:    You have a fever. You have nausea or vomiting. You have a change in bowel habits (new constipation or diarrhea). Your symptoms get worse or are not improving as expected. Where can you learn more? Go to http://www.amaya.com/  Enter C571 in the search box to learn more about \"Colon Polyps: Care Instructions. \"  Current as of: June 6, 2022               Content Version: 13.4  © 2006-2022 [employer]. Care instructions adapted under license by [employer] (which disclaims liability or warranty for this information). If you have questions about a medical condition or this instruction, always ask your healthcare professional. [address] any warranty or liability for your use of this information. Esophageal Dilation: What to Expect at [address] had an esophageal dilation. This procedure can open up narrow areas of the esophagus. After the procedure, you will stay at the hospital or surgery center for 1 to 2 hours. This will allow the medicine to wear off. You will be able to go home after your doctor or nurse checks to make sure that you're not having any problems. This care sheet gives you a general idea about how long it will take for you to recover. But each person recovers at a different pace. Follow the steps below to get better as quickly as possible. How can you care for yourself at home? Activity    Rest as much as you need to after you go home.      You should be able to go back to your usual activities the day after the procedure. Diet    Follow your doctor's directions for eating after the procedure. Drink plenty of fluids (unless your doctor has told you not to). Medicines    Your doctor will tell you if and when you can restart your medicines. He or she will also give you instructions about taking any new medicines. If you stopped taking aspirin or some other blood thinner, your doctor will tell you when to start taking it again. If you have a sore throat the day after the procedure, use an over-the-counter spray to numb your throat. Sucking on throat lozenges and gargling with warm salt water may also help relieve your symptoms. Follow-up care is a key part of your treatment and safety. Be sure to make and go to all appointments, and call your doctor if you are having problems. It's also a good idea to know your test results and keep a list of the medicines you take. When should you call for help? Call 911 anytime you think you may need emergency care. For example, call if:    You passed out (lost consciousness). You have trouble breathing. Your stools are maroon or very bloody   Call your doctor now or seek immediate medical care if:    You have new or worse belly pain. You have a fever. You have new or more blood in your stools. You are sick to your stomach and cannot drink fluids. You cannot pass stools or gas. You have pain that does not get better after you take pain medicine. Watch closely for changes in your health, and be sure to contact your doctor if:    Your throat still hurts after a day or two. You do not get better as expected. Where can you learn more? Go to http://www.gray.com/  Enter J014 in the search box to learn more about \"Esophageal Dilation: What to Expect at Home. \"  Current as of: June 6, 2022               Content Version: 13.4  © 5235-3560 Healthwise, [facility].    Care instructions adapted under license by [employer] (which disclaims liability or warranty for this information). If you have questions about a medical condition or this instruction, always ask your healthcare professional. [address] any warranty or liability for your use of this information. Hiatal Hernia: Care Instructions  Your Care Instructions  A hiatal hernia occurs when part of the stomach bulges into the chest cavity. A hiatal hernia may allow stomach acid and juices to back up into the esophagus (acid reflux). This can cause a feeling of burning, warmth, heat, or pain behind the breastbone. This feeling may often occur after you eat, soon after you lie down, or when you bend forward, and it may come and go. You also may have a sour taste in your mouth. These symptoms are commonly known as heartburn or reflux. But not all hiatal hernias cause symptoms. Follow-up care is a key part of your treatment and safety. Be sure to make and go to all appointments, and call your doctor if you are having problems. It's also a good idea to know your test results and keep a list of the medicines you take. How can you care for yourself at home? Take your medicines exactly as prescribed. Call your doctor if you think you are having a problem with your medicine. Do not take aspirin or other nonsteroidal anti-inflammatory drugs (NSAIDs), such as ibuprofen (Advil, Motrin) or naproxen (Aleve), unless your doctor says it is okay. Ask your doctor what you can take for pain. Your doctor may recommend over-the-counter medicine. For mild or occasional indigestion, antacids such as Tums, Gaviscon, Maalox, or Mylanta may help. Your doctor also may recommend over-the-counter acid reducers, such as famotidine (Pepcid AC), cimetidine (Tagamet HB), or omeprazole (Prilosec). Read and follow all instructions on the label. If you use these medicines often, talk with your doctor.   Change your eating habits. It's best to eat several small meals instead of two or three large meals. After you eat, wait 2 to 3 hours before you lie down. Late-night snacks aren't a good idea. Avoid foods that make your symptoms worse. These may include chocolate, mint, alcohol, pepper, spicy foods, high-fat foods, or drinks with caffeine in them, such as tea, coffee, andrew, or energy drinks. If your symptoms are worse after you eat a certain food, you may want to stop eating it to see if your symptoms get better. Do not smoke or chew tobacco.  If you get heartburn at night, raise the head of your bed 6 to 8 inches by putting the frame on blocks or placing a foam wedge under the head of your mattress. (Adding extra pillows does not work.)  Do not wear tight clothing around your middle. Lose weight if you need to. Losing just 5 to 10 pounds can help. When should you call for help? Call your doctor now or seek immediate medical care if:    You have new or worse belly pain. You are vomiting. Watch closely for changes in your health, and be sure to contact your doctor if:    You have new or worse symptoms of indigestion. You have trouble or pain swallowing. You are losing weight. You do not get better as expected. Where can you learn more? Go to http://www.amaya.com/  Enter T074 in the search box to learn more about \"Hiatal Hernia: Care Instructions. \"  Current as of: June 6, 2022               Content Version: 13.4  © 2006-2022 [employer]. Care instructions adapted under license by [employer] (which disclaims liability or warranty for this information). If you have questions about a medical condition or this instruction, always ask your healthcare professional. [address] any warranty or liability for your use of this information.

## 2023-02-04 DIAGNOSIS — E11.22 TYPE 2 DIABETES MELLITUS WITH STAGE 3B CHRONIC KIDNEY DISEASE, WITH LONG-TERM CURRENT USE OF INSULIN (HCC): Primary | ICD-10-CM

## 2023-02-04 DIAGNOSIS — Z79.4 TYPE 2 DIABETES MELLITUS WITH STAGE 3B CHRONIC KIDNEY DISEASE, WITH LONG-TERM CURRENT USE OF INSULIN (HCC): Primary | ICD-10-CM

## 2023-02-04 DIAGNOSIS — E78.2 MIXED HYPERLIPIDEMIA: Primary | ICD-10-CM

## 2023-02-04 DIAGNOSIS — D50.0 IRON DEFICIENCY ANEMIA DUE TO CHRONIC BLOOD LOSS: Primary | ICD-10-CM

## 2023-02-04 DIAGNOSIS — N18.32 TYPE 2 DIABETES MELLITUS WITH STAGE 3B CHRONIC KIDNEY DISEASE, WITH LONG-TERM CURRENT USE OF INSULIN (HCC): Primary | ICD-10-CM

## 2023-02-05 DIAGNOSIS — I10 PRIMARY HYPERTENSION: Primary | ICD-10-CM

## 2023-02-27 ENCOUNTER — OFFICE VISIT (OUTPATIENT)
Age: 78
End: 2023-02-27
Payer: COMMERCIAL

## 2023-02-27 ENCOUNTER — TELEPHONE (OUTPATIENT)
Age: 78
End: 2023-02-27

## 2023-02-27 VITALS
OXYGEN SATURATION: 98 % | SYSTOLIC BLOOD PRESSURE: 129 MMHG | HEART RATE: 88 BPM | TEMPERATURE: 98.5 F | RESPIRATION RATE: 20 BRPM | BODY MASS INDEX: 29.19 KG/M2 | HEIGHT: 67 IN | WEIGHT: 186 LBS | DIASTOLIC BLOOD PRESSURE: 55 MMHG

## 2023-02-27 DIAGNOSIS — I10 ESSENTIAL (PRIMARY) HYPERTENSION: ICD-10-CM

## 2023-02-27 DIAGNOSIS — E11.22 TYPE 2 DIABETES MELLITUS WITH STAGE 3B CHRONIC KIDNEY DISEASE, WITHOUT LONG-TERM CURRENT USE OF INSULIN (HCC): Primary | ICD-10-CM

## 2023-02-27 DIAGNOSIS — I73.9 PERIPHERAL VASCULAR DISEASE, UNSPECIFIED (HCC): ICD-10-CM

## 2023-02-27 DIAGNOSIS — N18.32 TYPE 2 DIABETES MELLITUS WITH STAGE 3B CHRONIC KIDNEY DISEASE, WITHOUT LONG-TERM CURRENT USE OF INSULIN (HCC): Primary | ICD-10-CM

## 2023-02-27 DIAGNOSIS — E78.2 MIXED HYPERLIPIDEMIA: ICD-10-CM

## 2023-02-27 DIAGNOSIS — D50.0 IRON DEFICIENCY ANEMIA SECONDARY TO BLOOD LOSS (CHRONIC): ICD-10-CM

## 2023-02-27 PROCEDURE — 99214 OFFICE O/P EST MOD 30 MIN: CPT | Performed by: INTERNAL MEDICINE

## 2023-02-27 PROCEDURE — 3052F HG A1C>EQUAL 8.0%<EQUAL 9.0%: CPT | Performed by: INTERNAL MEDICINE

## 2023-02-27 PROCEDURE — 3078F DIAST BP <80 MM HG: CPT | Performed by: INTERNAL MEDICINE

## 2023-02-27 PROCEDURE — 3074F SYST BP LT 130 MM HG: CPT | Performed by: INTERNAL MEDICINE

## 2023-02-27 PROCEDURE — 1123F ACP DISCUSS/DSCN MKR DOCD: CPT | Performed by: INTERNAL MEDICINE

## 2023-02-27 RX ORDER — AMLODIPINE BESYLATE 5 MG/1
TABLET ORAL
Qty: 90 TABLET | Refills: 3 | Status: SHIPPED | OUTPATIENT
Start: 2023-02-27

## 2023-02-27 SDOH — ECONOMIC STABILITY: HOUSING INSECURITY
IN THE LAST 12 MONTHS, WAS THERE A TIME WHEN YOU DID NOT HAVE A STEADY PLACE TO SLEEP OR SLEPT IN A SHELTER (INCLUDING NOW)?: NO

## 2023-02-27 SDOH — ECONOMIC STABILITY: FOOD INSECURITY: WITHIN THE PAST 12 MONTHS, YOU WORRIED THAT YOUR FOOD WOULD RUN OUT BEFORE YOU GOT MONEY TO BUY MORE.: NEVER TRUE

## 2023-02-27 SDOH — ECONOMIC STABILITY: FOOD INSECURITY: WITHIN THE PAST 12 MONTHS, THE FOOD YOU BOUGHT JUST DIDN'T LAST AND YOU DIDN'T HAVE MONEY TO GET MORE.: NEVER TRUE

## 2023-02-27 SDOH — ECONOMIC STABILITY: INCOME INSECURITY: HOW HARD IS IT FOR YOU TO PAY FOR THE VERY BASICS LIKE FOOD, HOUSING, MEDICAL CARE, AND HEATING?: NOT HARD AT ALL

## 2023-02-27 ASSESSMENT — PATIENT HEALTH QUESTIONNAIRE - PHQ9
SUM OF ALL RESPONSES TO PHQ QUESTIONS 1-9: 0
2. FEELING DOWN, DEPRESSED OR HOPELESS: 0
SUM OF ALL RESPONSES TO PHQ QUESTIONS 1-9: 0
SUM OF ALL RESPONSES TO PHQ9 QUESTIONS 1 & 2: 0
SUM OF ALL RESPONSES TO PHQ QUESTIONS 1-9: 0
1. LITTLE INTEREST OR PLEASURE IN DOING THINGS: 0
SUM OF ALL RESPONSES TO PHQ QUESTIONS 1-9: 0

## 2023-02-27 NOTE — PROGRESS NOTES
[facility]. presents today for   Chief Complaint   Patient presents with    Cholesterol Problem    Hypertension    Diabetes     3 month follow up         1. \"Have you been to the ER, urgent care clinic since your last visit? Hospitalized since your last visit? \" Yes, [facility] for [address] 58 blood. 2. \"Have you seen or consulted any other health care providers outside of the [address] [place] [address] since your last visit? \" Yes, Dr. Zari Morel. 3. For patients aged 39-70: Has the patient had a colonoscopy / FIT/ Cologuard? Yes - no Care Gap present      If the patient is female:    4. For patients aged 41-77: Has the patient had a mammogram within the past 2 years? NA - based on age or sex      11. For patients aged 21-65: Has the patient had a pap smear?  NA - based on age or sex

## 2023-02-27 NOTE — PROGRESS NOTES
Subjective:       Chief Complaint  The patient presents for follow up of diabetes, hypertension and high cholesterol. JANIE Thomas is a 68 y.o. male seen for follow up of diabetes. Rebecca has hypertension and hyperlipidemia. Diabetes status uncontrolled on Semglee (glargine) insulin 50 units daily and Humalog 10  units BID with meals, pt is followed by Endocrine, Dr Rick Rueda office, he was started on Actos 30 mg but did not tolerate it,  Renal also placed him on Farxiga 10 mg due to CKD stage 3 but he did not tolerate it, hypertension  controlled,  on lisinopril 20 mg and coreg 12.5 mg BD, and Norvasc 10 mg and HCTZ at home,  patient brings in his blood pressure readings which average about 130's/50's at home. Patient is not always compliant with taking his medicines consistently, hyperlipidemia no significant medication side effects noted,  controlled on  Lipitor 40 mg. . He has h/o CVA and right carotid endarterectomy. Diet and Lifestyle: generally follows a low fat low cholesterol diet, does not rigorously follow a diabetic diet, exercises sporadically    Home BP Monitoring: is controlled at home,    Diabetic Review of Systems - home glucose monitoring: is performed regularly, 3x/day. Other symptoms and concerns: Patient has a history of asbestosis exposure. Consider chest x-ray follow-up in the near future. Discussed the patient's BMI with him. The BMI follow up plan is as follows: I have counseled this patient on diet and exercise regimens. [place] in the [place].  It has been over 5 years. Cologuard done June 2021 was negative. At age 76 no further testing needed unless patient has symptoms. Patient has a history of asthma and is doing fairly well on Advair. Patient has a history of TIAs/CVA  in the past and has a completely occluded left carotid artery. This was noted in 2015. Patient to continue on aspirin 81 mg daily and statin.   As mentioned above patient had recent right carotid endarterectomy after he had recent CVA. Patient continues on vitamin D 5000 units/day for his vitamin D deficiency    Patient has chronic anemia which is probably multifactorial and will follow-up with hematology for further management. Current Outpatient Medications   Medication Sig    amLODIPine (NORVASC) 5 MG tablet Take 1 tablet by mouth once daily    Lancets MISC 1 each    acetaminophen (TYLENOL) 500 MG tablet Take 500 mg by mouth every 6 hours as needed    albuterol sulfate HFA (PROVENTIL;VENTOLIN;PROAIR) 108 (90 Base) MCG/ACT inhaler Inhale 2 puffs into the lungs every 4 hours as needed    aspirin 81 MG EC tablet Take 81 mg by mouth daily    atorvastatin (LIPITOR) 40 MG tablet Take 40 mg by mouth daily    carvedilol (COREG) 25 MG tablet Take 25 mg by mouth 2 times daily    vitamin D3 (CHOLECALCIFEROL) 125 MCG (5000 UT) TABS tablet Take 5,000 Units by mouth daily    clopidogrel (PLAVIX) 75 MG tablet Take 75 mg by mouth daily    diphenoxylate-atropine (LOMOTIL) 2.5-0.025 MG per tablet Take 1 tablet by mouth 4 times daily as needed. hydroCHLOROthiazide (MICROZIDE) 12.5 MG capsule Take 12.5 mg by mouth daily    insulin lispro, 1 Unit Dial, (HUMALOG/ADMELOG) 100 UNIT/ML SOPN 10 to 20 units BID with meals (as needed)    lansoprazole (PREVACID) 30 MG delayed release capsule Take 30 mg by mouth every morning (before breakfast)    lisinopril (PRINIVIL;ZESTRIL) 40 MG tablet Take 20 mg by mouth daily    metFORMIN (GLUCOPHAGE) 500 MG tablet Prescribed by Dr. Roxi Rooney    sildenafil (VIAGRA) 100 MG tablet Take 100 mg by mouth as needed     No current facility-administered medications for this visit.                  Review of Systems  Respiratory: negative for dyspnea on exertion  Cardiovascular: negative for chest pain    Objective:   Visit Vitals  BP (!) 129/55   Pulse 88   Temp 98.5 °F (36.9 °C) (Temporal)   Resp 20   Ht 5' 7\" (1.702 m)   Wt 186 lb (84.4 kg) SpO2 98%   BMI 29.13 kg/m²       Chest - clear to auscultation, no wheezes, rales or rhonchi, symmetric air entry  Heart - normal rate and regular rhythm, regular rhythm, systolic murmur 2/6 at 2nd left intercostal space  Extremities - pedal edema 1 +      CMP:    Lab Results   Component Value Date/Time     01/23/2023 09:08 AM    K 4.2 01/23/2023 09:08 AM     01/23/2023 09:08 AM    CO2 23 01/23/2023 09:08 AM    BUN 21 01/23/2023 09:08 AM    CREATININE 1.69 01/23/2023 09:08 AM    GFRAA 50 01/03/2022 09:20 AM    AGRATIO 1.6 01/23/2023 09:08 AM    LABGLOM 41 01/23/2023 09:08 AM    GLUCOSE 203 01/23/2023 09:08 AM    PROT 6.7 01/23/2023 09:08 AM    LABALBU 4.1 01/23/2023 09:08 AM    CALCIUM 9.4 01/23/2023 09:08 AM    BILITOT <0.2 01/23/2023 09:08 AM    ALKPHOS 95 01/23/2023 09:08 AM    AST 18 01/23/2023 09:08 AM    ALT 19 01/23/2023 09:08 AM     HgBA1c:    Lab Results   Component Value Date/Time    LABA1C 8.8 01/23/2023 09:08 AM     Microalbumen/Creatinine ratio:  No components found for: RUCREAT  FLP:    Lab Results   Component Value Date/Time    TRIG 75 01/23/2023 09:08 AM    HDL 56 01/23/2023 09:08 AM    LDLCALC 50 01/23/2023 09:08 AM    LABVLDL 19 01/14/2021 10:12 AM     CBC with Differential:    Lab Results   Component Value Date/Time    WBC 4.3 08/23/2022 08:51 AM    RBC 3.58 08/23/2022 08:51 AM    HGB 8.4 08/23/2022 08:51 AM    HCT 28.2 08/23/2022 08:51 AM     08/23/2022 08:51 AM    MCV 79 08/23/2022 08:51 AM    MCH 23.5 08/23/2022 08:51 AM    MCHC 29.8 08/23/2022 08:51 AM    RDW 19.7 08/23/2022 08:51 AM    LYMPHOPCT 31 08/23/2022 08:51 AM    MONOPCT 12 08/23/2022 08:51 AM    BASOPCT 1 08/23/2022 08:51 AM    MONOSABS 0.5 08/23/2022 08:51 AM    LYMPHSABS 1.3 08/23/2022 08:51 AM    EOSABS 0.1 08/23/2022 08:51 AM    BASOSABS 0.0 08/23/2022 08:51 AM           Assessment / Plan     Diabetes uncontrolled, on glargine insulin 50 units daily and Humalog 10-units 2 times daily with meals.   Patient being followed by endocrine. His compliance is poor. He is advised not to skip Humalog insulin if BS <125 but just take 5 units instead of 10 ,  hypertension controlled at home on Coreg and lisinopril  and  HCTZ 25 mg daily. Patient probably has some whitecoat hypertension but takes his medicines fairly irregularly in order to avoid low blood pressures. Hyperlipidemia well controlled on Lipitor 40 mg daily      ICD-10-CM    1. Type 2 diabetes mellitus with stage 3b chronic kidney disease, without long-term current use of insulin (HCC)  E11.22 Hemoglobin A1C    N18.32 Microalbumin / Creatinine Urine Ratio      2. Mixed hyperlipidemia  E78.2 Lipid Panel      3. Essential (primary) hypertension  I10 Comprehensive Metabolic Panel      4. Iron deficiency anemia secondary to blood loss (chronic)  D50.0 Probably multifactorial.  Patient to follow-up with hematology for further management      5. Peripheral vascular disease, unspecified (HCC)  I73.9 Stable on current medications patient to follow-up with vascular surgery. Diabetic issues reviewed with him: diabetic diet discussed in detail, and low cholesterol diet, weight control and daily exercise discussed. Return in about 3 months (around 5/27/2023) for labs 1 week before. Reviewed plan of care. Patient has provided input and agrees with goals.

## 2023-02-27 NOTE — TELEPHONE ENCOUNTER
Please advise when future labs are put in Epic. Pt goes offsite. I wiill mail the orders.     Next OV made for 06/01/23

## 2023-03-06 ENCOUNTER — TELEPHONE (OUTPATIENT)
Age: 78
End: 2023-03-06

## 2023-03-06 DIAGNOSIS — L98.9 SKIN LESION OF BACK: Primary | ICD-10-CM

## 2023-03-06 NOTE — TELEPHONE ENCOUNTER
Pt calling, says he has a hard boil in the middle of his back. Says Dr. Neha Arteaga knows it is there but he says he now needs it cut out of his back. Wants referral to a doctor that does that.

## 2023-03-22 RX ORDER — CARVEDILOL 25 MG/1
25 TABLET ORAL 2 TIMES DAILY
Qty: 180 TABLET | Refills: 3 | Status: SHIPPED | OUTPATIENT
Start: 2023-03-22

## 2023-03-22 NOTE — TELEPHONE ENCOUNTER
Rx needs to be changed to 90 days with refills for mail order    Requested Prescriptions     Pending Prescriptions Disp Refills    carvedilol (COREG) 25 MG tablet 60 tablet      Sig: Take 1 tablet by mouth 2 times daily

## 2023-05-23 LAB
ALBUMIN SERPL-MCNC: 4.3 G/DL (ref 3.7–4.7)
ALBUMIN/CREAT UR: 422 MG/G CREAT (ref 0–29)
ALBUMIN/GLOB SERPL: 2 {RATIO} (ref 1.2–2.2)
ALP SERPL-CCNC: 72 IU/L (ref 44–121)
ALT SERPL-CCNC: 17 IU/L (ref 0–44)
AST SERPL-CCNC: 16 IU/L (ref 0–40)
BILIRUB SERPL-MCNC: 0.2 MG/DL (ref 0–1.2)
BUN SERPL-MCNC: 37 MG/DL (ref 8–27)
BUN/CREAT SERPL: 19 (ref 10–24)
CALCIUM SERPL-MCNC: 9.5 MG/DL (ref 8.6–10.2)
CHLORIDE SERPL-SCNC: 105 MMOL/L (ref 96–106)
CHOLEST SERPL-MCNC: 148 MG/DL (ref 100–199)
CO2 SERPL-SCNC: 18 MMOL/L (ref 20–29)
CREAT SERPL-MCNC: 1.91 MG/DL (ref 0.76–1.27)
CREAT UR-MCNC: 54.1 MG/DL
EGFRCR SERPLBLD CKD-EPI 2021: 36 ML/MIN/1.73
GLOBULIN SER CALC-MCNC: 2.2 G/DL (ref 1.5–4.5)
GLUCOSE SERPL-MCNC: 157 MG/DL (ref 70–99)
HBA1C MFR BLD: 9 % (ref 4.8–5.6)
HDLC SERPL-MCNC: 64 MG/DL
LDLC SERPL CALC-MCNC: 70 MG/DL (ref 0–99)
MICROALBUMIN UR-MCNC: 228.5 UG/ML
POTASSIUM SERPL-SCNC: 4.9 MMOL/L (ref 3.5–5.2)
PROT SERPL-MCNC: 6.5 G/DL (ref 6–8.5)
SODIUM SERPL-SCNC: 139 MMOL/L (ref 134–144)
SPECIMEN STATUS REPORT: NORMAL
TRIGL SERPL-MCNC: 71 MG/DL (ref 0–149)
VLDLC SERPL CALC-MCNC: 14 MG/DL (ref 5–40)

## 2023-06-01 ENCOUNTER — OFFICE VISIT (OUTPATIENT)
Age: 78
End: 2023-06-01
Payer: COMMERCIAL

## 2023-06-01 VITALS
HEIGHT: 67 IN | DIASTOLIC BLOOD PRESSURE: 76 MMHG | OXYGEN SATURATION: 98 % | BODY MASS INDEX: 31.08 KG/M2 | HEART RATE: 61 BPM | RESPIRATION RATE: 20 BRPM | WEIGHT: 198 LBS | SYSTOLIC BLOOD PRESSURE: 159 MMHG | TEMPERATURE: 97.6 F

## 2023-06-01 DIAGNOSIS — E78.2 MIXED HYPERLIPIDEMIA: ICD-10-CM

## 2023-06-01 DIAGNOSIS — E11.22 TYPE 2 DIABETES MELLITUS WITH STAGE 3B CHRONIC KIDNEY DISEASE, WITHOUT LONG-TERM CURRENT USE OF INSULIN (HCC): Primary | ICD-10-CM

## 2023-06-01 DIAGNOSIS — N18.32 TYPE 2 DIABETES MELLITUS WITH STAGE 3B CHRONIC KIDNEY DISEASE, WITHOUT LONG-TERM CURRENT USE OF INSULIN (HCC): Primary | ICD-10-CM

## 2023-06-01 DIAGNOSIS — I10 ESSENTIAL (PRIMARY) HYPERTENSION: ICD-10-CM

## 2023-06-01 PROCEDURE — 3078F DIAST BP <80 MM HG: CPT | Performed by: INTERNAL MEDICINE

## 2023-06-01 PROCEDURE — 99214 OFFICE O/P EST MOD 30 MIN: CPT | Performed by: INTERNAL MEDICINE

## 2023-06-01 PROCEDURE — 3074F SYST BP LT 130 MM HG: CPT | Performed by: INTERNAL MEDICINE

## 2023-06-01 PROCEDURE — 3052F HG A1C>EQUAL 8.0%<EQUAL 9.0%: CPT | Performed by: INTERNAL MEDICINE

## 2023-06-01 PROCEDURE — 1123F ACP DISCUSS/DSCN MKR DOCD: CPT | Performed by: INTERNAL MEDICINE

## 2023-06-01 RX ORDER — HYDROCHLOROTHIAZIDE 25 MG/1
25 TABLET ORAL EVERY MORNING
Qty: 90 TABLET | Refills: 3 | Status: SHIPPED | OUTPATIENT
Start: 2023-06-01

## 2023-06-01 RX ORDER — LANSOPRAZOLE 30 MG/1
30 CAPSULE, DELAYED RELEASE ORAL
Qty: 90 CAPSULE | Refills: 3 | Status: SHIPPED | OUTPATIENT
Start: 2023-06-01

## 2023-06-05 ENCOUNTER — TELEPHONE (OUTPATIENT)
Age: 78
End: 2023-06-05

## 2023-06-05 RX ORDER — BENZONATATE 200 MG/1
200 CAPSULE ORAL 3 TIMES DAILY PRN
Qty: 60 CAPSULE | Refills: 0 | Status: SHIPPED | OUTPATIENT
Start: 2023-06-05 | End: 2023-06-06 | Stop reason: SDUPTHER

## 2023-06-05 NOTE — TELEPHONE ENCOUNTER
Patient is asking for a 3 month supply and pharmacy is  [place] [id number] [phone]

## 2023-06-05 NOTE — TELEPHONE ENCOUNTER
Pt called in requesting a prescription for Benzonatate. I did not see it in his chart but he stated it was for his cough. Please Advise.

## 2023-06-05 NOTE — TELEPHONE ENCOUNTER
Its a cough med and he should not need it for 3 months.  If he is constantly coughing need to revaluate

## 2023-06-06 ENCOUNTER — OFFICE VISIT (OUTPATIENT)
Age: 78
End: 2023-06-06
Payer: COMMERCIAL

## 2023-06-06 VITALS
SYSTOLIC BLOOD PRESSURE: 158 MMHG | DIASTOLIC BLOOD PRESSURE: 72 MMHG | HEART RATE: 66 BPM | HEIGHT: 67 IN | BODY MASS INDEX: 30.76 KG/M2 | WEIGHT: 196 LBS | OXYGEN SATURATION: 97 %

## 2023-06-06 DIAGNOSIS — I10 ESSENTIAL HYPERTENSION: Primary | ICD-10-CM

## 2023-06-06 DIAGNOSIS — N30.00 ACUTE CYSTITIS WITHOUT HEMATURIA: ICD-10-CM

## 2023-06-06 DIAGNOSIS — K59.00 CONSTIPATION, UNSPECIFIED CONSTIPATION TYPE: ICD-10-CM

## 2023-06-06 PROBLEM — I65.29 CAROTID STENOSIS: Status: ACTIVE | Noted: 2021-10-20

## 2023-06-06 PROBLEM — E11.9 TYPE 2 DIABETES MELLITUS (HCC): Status: ACTIVE | Noted: 2017-05-05

## 2023-06-06 PROBLEM — I12.9 HYPERTENSIVE CHRONIC KIDNEY DISEASE WITH STAGE 1 THROUGH STAGE 4 CHRONIC KIDNEY DISEASE, OR UNSPECIFIED CHRONIC KIDNEY DISEASE: Status: ACTIVE | Noted: 2021-12-06

## 2023-06-06 PROBLEM — M51.369 DDD (DEGENERATIVE DISC DISEASE), LUMBAR: Status: ACTIVE | Noted: 2017-05-05

## 2023-06-06 PROBLEM — I67.9 CEREBROVASCULAR DISEASE: Status: ACTIVE | Noted: 2017-05-05

## 2023-06-06 PROBLEM — E11.42 POLYNEUROPATHY DUE TO TYPE 2 DIABETES MELLITUS (HCC): Status: ACTIVE | Noted: 2017-05-05

## 2023-06-06 PROBLEM — M51.36 DDD (DEGENERATIVE DISC DISEASE), LUMBAR: Status: ACTIVE | Noted: 2017-05-05

## 2023-06-06 PROBLEM — N17.9 ACUTE KIDNEY INJURY (HCC): Status: ACTIVE | Noted: 2023-02-01

## 2023-06-06 PROBLEM — M51.34 DDD (DEGENERATIVE DISC DISEASE), THORACIC: Status: ACTIVE | Noted: 2017-05-05

## 2023-06-06 PROBLEM — E29.1 OTHER TESTICULAR HYPOFUNCTION: Status: ACTIVE | Noted: 2023-06-06

## 2023-06-06 PROBLEM — G45.9 TIA (TRANSIENT ISCHEMIC ATTACK): Status: ACTIVE | Noted: 2021-10-14

## 2023-06-06 PROBLEM — M51.9 DISORDER OF INTERVERTEBRAL DISC OF LUMBAR SPINE: Status: ACTIVE | Noted: 2017-05-05

## 2023-06-06 PROBLEM — I65.21 STENOSIS OF RIGHT CAROTID ARTERY: Status: ACTIVE | Noted: 2022-08-05

## 2023-06-06 PROBLEM — E11.22 TYPE 2 DIABETES MELLITUS WITH CHRONIC KIDNEY DISEASE (HCC): Status: ACTIVE | Noted: 2021-06-10

## 2023-06-06 PROBLEM — N18.31 STAGE 3A CHRONIC KIDNEY DISEASE (HCC): Status: ACTIVE | Noted: 2017-05-05

## 2023-06-06 PROCEDURE — 99214 OFFICE O/P EST MOD 30 MIN: CPT | Performed by: INTERNAL MEDICINE

## 2023-06-06 PROCEDURE — 3077F SYST BP >= 140 MM HG: CPT | Performed by: INTERNAL MEDICINE

## 2023-06-06 PROCEDURE — 93000 ELECTROCARDIOGRAM COMPLETE: CPT | Performed by: INTERNAL MEDICINE

## 2023-06-06 PROCEDURE — 1123F ACP DISCUSS/DSCN MKR DOCD: CPT | Performed by: INTERNAL MEDICINE

## 2023-06-06 PROCEDURE — 3078F DIAST BP <80 MM HG: CPT | Performed by: INTERNAL MEDICINE

## 2023-06-06 RX ORDER — BENZONATATE 200 MG/1
200 CAPSULE ORAL 3 TIMES DAILY PRN
Qty: 60 CAPSULE | Refills: 2 | Status: SHIPPED | OUTPATIENT
Start: 2023-06-06 | End: 2023-06-06

## 2023-06-06 ASSESSMENT — PATIENT HEALTH QUESTIONNAIRE - PHQ9
SUM OF ALL RESPONSES TO PHQ9 QUESTIONS 1 & 2: 0
SUM OF ALL RESPONSES TO PHQ QUESTIONS 1-9: 0
1. LITTLE INTEREST OR PLEASURE IN DOING THINGS: 0
SUM OF ALL RESPONSES TO PHQ QUESTIONS 1-9: 0
2. FEELING DOWN, DEPRESSED OR HOPELESS: 0

## 2023-06-06 NOTE — PROGRESS NOTES
Antonia Rosales. presents today for   Chief Complaint   Patient presents with    Follow-up     6 month       Antonia Rosales. preferred language for health care discussion is english/other. Is someone accompanying this pt? yes    Is the patient using any DME equipment during OV? no    Depression Screening:  Depression: Not at risk    PHQ-2 Score: 0        Learning Assessment:  Who is the primary learner? Patient    What is the preferred language for health care of the primary learner? ENGLISH    How does the primary learner prefer to learn new concepts? DEMONSTRATION    Answered By patient    Relationship to Learner SELF           Pt currently taking Anticoagulant therapy? no    Pt currently taking Antiplatelet therapy ? Aspirin 81 mg daily  Plavix 75 mg daily      Coordination of Care:  1. Have you been to the ER, urgent care clinic since your last visit? Hospitalized since your last visit? no    2. Have you seen or consulted any other health care providers outside of the [address] [place] [address] since your last visit? Include any pap smears or colon screening.  no
09:19 AM    CHOL 121 01/23/2023 09:08 AM    HDL 64 05/22/2023 09:19 AM        BP Readings from Last 3 Encounters:   06/06/23 (!) 158/72   06/01/23 (!) 159/76   02/27/23 (!) 129/55        Pulse Readings from Last 3 Encounters:   06/06/23 66   06/01/23 61   02/27/23 88       Wt Readings from Last 3 Encounters:   06/06/23 196 lb (88.9 kg)   06/01/23 198 lb (89.8 kg)   02/27/23 186 lb (84.4 kg)         EKG: normal EKG, normal sinus rhythm, unchanged from previous tracings.      Cadence Rocha MD   6/6/2023

## 2023-06-06 NOTE — TELEPHONE ENCOUNTER
Patient states the cough is not constant but the cough comes and goes. Patient states he just wanted to have some on hand incase he started coughing again.

## 2023-06-08 NOTE — PROGRESS NOTES
fairly well on Advair. Patient has a history of TIAs/CVA  in the past and has a completely occluded left carotid artery. This was noted in 2015. Patient to continue on aspirin 81 mg daily and statin. As mentioned above patient had recent right carotid endarterectomy after he had recent CVA. Patient continues on vitamin D 5000 units/day for his vitamin D deficiency    Patient has chronic anemia which is probably multifactorial and will follow-up with hematology for further management. Current Outpatient Medications   Medication Sig    lansoprazole (PREVACID) 30 MG delayed release capsule Take 1 capsule by mouth every morning (before breakfast)    hydroCHLOROthiazide (HYDRODIURIL) 25 MG tablet Take 1 tablet by mouth every morning    lisinopril (PRINIVIL;ZESTRIL) 40 MG tablet Take 1 tablet by mouth daily    carvedilol (COREG) 25 MG tablet Take 1 tablet by mouth 2 times daily    amLODIPine (NORVASC) 5 MG tablet Take 1 tablet by mouth once daily (Patient taking differently: Take 1 tablet by mouth daily Take 1 tablet by mouth once daily)    Lancets MISC 1 each    acetaminophen (TYLENOL) 500 MG tablet Take 1 tablet by mouth every 6 hours as needed    albuterol sulfate HFA (PROVENTIL;VENTOLIN;PROAIR) 108 (90 Base) MCG/ACT inhaler Inhale 2 puffs into the lungs every 4 hours as needed    aspirin 81 MG EC tablet Take 1 tablet by mouth daily    atorvastatin (LIPITOR) 40 MG tablet Take 1 tablet by mouth daily    vitamin D3 (CHOLECALCIFEROL) 125 MCG (5000 UT) TABS tablet Take 1 tablet by mouth daily    clopidogrel (PLAVIX) 75 MG tablet Take 1 tablet by mouth daily    insulin lispro, 1 Unit Dial, (HUMALOG/ADMELOG) 100 UNIT/ML SOPN 100 Units    metFORMIN (GLUCOPHAGE) 500 MG tablet Take 1 tablet by mouth 2 times daily     No current facility-administered medications for this visit.                  Review of Systems  Respiratory: negative for dyspnea on exertion  Cardiovascular: negative for chest pain    Objective:

## 2023-06-19 ENCOUNTER — PHARMACY VISIT (OUTPATIENT)
Age: 78
End: 2023-06-19

## 2023-06-19 DIAGNOSIS — E11.22 TYPE 2 DIABETES MELLITUS WITH STAGE 3B CHRONIC KIDNEY DISEASE, WITHOUT LONG-TERM CURRENT USE OF INSULIN (HCC): Primary | ICD-10-CM

## 2023-06-19 DIAGNOSIS — N18.32 TYPE 2 DIABETES MELLITUS WITH STAGE 3B CHRONIC KIDNEY DISEASE, WITHOUT LONG-TERM CURRENT USE OF INSULIN (HCC): Primary | ICD-10-CM

## 2023-06-19 RX ORDER — INSULIN DEGLUDEC 200 U/ML
INJECTION, SOLUTION SUBCUTANEOUS
Qty: 9 ML | Refills: 11 | Status: SHIPPED | OUTPATIENT
Start: 2023-06-19

## 2023-06-19 RX ORDER — DULAGLUTIDE 0.75 MG/.5ML
0.75 INJECTION, SOLUTION SUBCUTANEOUS WEEKLY
Qty: 2 ML | Refills: 1 | Status: SHIPPED | OUTPATIENT
Start: 2023-06-19

## 2023-06-23 ENCOUNTER — TELEPHONE (OUTPATIENT)
Age: 78
End: 2023-06-23

## 2023-06-23 NOTE — TELEPHONE ENCOUNTER
Patient called the office this morning reporting he feels like his heart is beating fast. Patient states he can hear his heart beat in his head and that it is fast and loud. Patient reports this has been on-going x 3 days. Patient reports taking his medications as prescribed and that his symptoms listed above have not improve after taking his medications. Patient was advised to go to the ED for further evaluation. Patient also reports he has a cardiologist that he has not contacted. He will reach out to his cardiologist as well. No additional questions.

## 2023-06-26 ENCOUNTER — TELEPHONE (OUTPATIENT)
Age: 78
End: 2023-06-26

## 2023-06-26 DIAGNOSIS — Z79.4 TYPE 2 DIABETES MELLITUS WITH DIABETIC NEPHROPATHY, WITH LONG-TERM CURRENT USE OF INSULIN (HCC): Primary | ICD-10-CM

## 2023-06-26 DIAGNOSIS — E11.21 TYPE 2 DIABETES MELLITUS WITH DIABETIC NEPHROPATHY, WITH LONG-TERM CURRENT USE OF INSULIN (HCC): Primary | ICD-10-CM

## 2023-06-27 ENCOUNTER — TELEPHONE (OUTPATIENT)
Age: 78
End: 2023-06-27

## 2023-06-27 DIAGNOSIS — E11.65 TYPE 2 DIABETES MELLITUS WITH HYPERGLYCEMIA, WITH LONG-TERM CURRENT USE OF INSULIN (HCC): Primary | ICD-10-CM

## 2023-06-27 DIAGNOSIS — Z79.4 TYPE 2 DIABETES MELLITUS WITH HYPERGLYCEMIA, WITH LONG-TERM CURRENT USE OF INSULIN (HCC): Primary | ICD-10-CM

## 2023-06-29 ENCOUNTER — TELEPHONE (OUTPATIENT)
Age: 78
End: 2023-06-29

## 2023-06-29 RX ORDER — INSULIN GLARGINE-YFGN 100 [IU]/ML
10 INJECTION, SOLUTION SUBCUTANEOUS NIGHTLY
Qty: 15 ML | Refills: 1 | Status: SHIPPED | OUTPATIENT
Start: 2023-06-29 | End: 2023-06-29

## 2023-07-03 ENCOUNTER — PHARMACY VISIT (OUTPATIENT)
Age: 78
End: 2023-07-03

## 2023-07-03 DIAGNOSIS — Z79.4 TYPE 2 DIABETES MELLITUS WITH HYPERGLYCEMIA, WITH LONG-TERM CURRENT USE OF INSULIN (HCC): Primary | ICD-10-CM

## 2023-07-03 DIAGNOSIS — E11.65 TYPE 2 DIABETES MELLITUS WITH HYPERGLYCEMIA, WITH LONG-TERM CURRENT USE OF INSULIN (HCC): Primary | ICD-10-CM

## 2023-07-03 RX ORDER — INSULIN GLARGINE-YFGN 100 [IU]/ML
10 INJECTION, SOLUTION SUBCUTANEOUS NIGHTLY
Qty: 15 ML | Refills: 1
Start: 2023-07-03

## 2023-07-03 RX ORDER — INSULIN LISPRO 100 [IU]/ML
INJECTION, SOLUTION INTRAVENOUS; SUBCUTANEOUS
COMMUNITY
End: 2023-07-03 | Stop reason: ALTCHOICE

## 2023-07-03 NOTE — PATIENT INSTRUCTIONS
- Start Semglee 10 units at bedtime once you pick it up. - Stop the Humalog once you start the Semglee.

## 2023-07-12 ENCOUNTER — TELEPHONE (OUTPATIENT)
Age: 78
End: 2023-07-12

## 2023-07-13 ENCOUNTER — TELEPHONE (OUTPATIENT)
Age: 78
End: 2023-07-13

## 2023-07-13 NOTE — TELEPHONE ENCOUNTER
Patient called requesting to speak with Dr Oliva Sears regarding his medication, he declined to leave a detailed message. He can be reached at [phone].   Please advise, thank you

## 2023-07-14 RX ORDER — DULAGLUTIDE 0.75 MG/.5ML
0.75 INJECTION, SOLUTION SUBCUTANEOUS WEEKLY
COMMUNITY

## 2023-07-14 RX ORDER — INSULIN DEGLUDEC 200 U/ML
25 INJECTION, SOLUTION SUBCUTANEOUS EVERY MORNING
COMMUNITY

## 2023-07-14 NOTE — TELEPHONE ENCOUNTER
Pharmacy Progress Note - Telephone Call    Mr. Edna Wolf. 68 y.o. was contacted via an outbound telephone call today     Pt had a blood transfusion while admitted at [address] [place] [address] 22 pt to stop the Humalog completely and to not utilize the sliding scale provided to him. He was wondering if he could restart the Trulicity. Advised he could restart this starting today. He is injecting [place] 25 units qam and has tolerated it well. The hospital told him it was his \"blood count\". His BG values have ranged in the low to mid 100s.     Thank you,    Fredi Solomon, PharmD, BCACP, [facility]    For Pharmacy Admin Tracking Only    Program: Medical Group  CPA in place:  Yes  Recommendation Provided To: Patient/Caregiver: 3 via Telephone  Intervention Detail: Adherence Monitorin, Discontinued Rx: 1, reason: Unsafe Therapy, and New Rx: 2, reason: Cost/Formulary Change, Patient Preference  Intervention Accepted By: Patient/Caregiver: 3  Gap Closed?: Yes   Time Spent (min): 20

## 2023-07-21 NOTE — PROGRESS NOTES
Note Revision History     Edited by [facility], 7/24/2023 10:23 AM       [facility]  Pharmacist  Specialty:  Pharmacy  Progress Notes      Sign when Signing Visit  Encounter Date:  7/24/2023                                                                                                                                                                                                                                                                                                                                                                                                                                                                                                                                                                                                                                                                                                                                         Pharmacy Progress Note - Diabetes Management        Assessment / Plan:   Diabetes Management:  Per ADA guidelines, Pt's A1c is not at goal of < 7%. Pt's basal control is poor and will need an increase in his [place] dose. Will increase Tresiba to 30 units qam.  Encouraged to restart this. His prandial control is more controlled with Trulicity on board, but he continues to have some prolonged elevations. He would benefit from a dose increase to 1.5mg weekly, but will hold off as pt may be stopping it on his own pending his ER visit. Will reassess with CGM data in 2 weeks. Iron Deficiency:  Advised pt to go to the ER for possible need for blood transfusion again. Nutrition/Lifestyle Modifications:  - Educated pt on the importance of moderating carbohydrate intake. Reviewed sources of carbohydrates and method to help determine appropriate portion sizes (e.g., Diabetes Plate Method).   - Advised patient to avoid sugar-sweetened beverages and replace with water or diet/zero sugar option.  - Recommend ~30

## 2023-07-24 ENCOUNTER — PHARMACY VISIT (OUTPATIENT)
Age: 78
End: 2023-07-24

## 2023-07-24 DIAGNOSIS — D50.0 IRON DEFICIENCY ANEMIA SECONDARY TO BLOOD LOSS (CHRONIC): ICD-10-CM

## 2023-07-24 DIAGNOSIS — E11.65 TYPE 2 DIABETES MELLITUS WITH HYPERGLYCEMIA, WITH LONG-TERM CURRENT USE OF INSULIN (HCC): Primary | ICD-10-CM

## 2023-07-24 DIAGNOSIS — Z79.4 TYPE 2 DIABETES MELLITUS WITH HYPERGLYCEMIA, WITH LONG-TERM CURRENT USE OF INSULIN (HCC): Primary | ICD-10-CM

## 2023-07-24 RX ORDER — INSULIN DEGLUDEC 200 U/ML
30 INJECTION, SOLUTION SUBCUTANEOUS EVERY MORNING
Qty: 9 ML | Refills: 1 | Status: SHIPPED
Start: 2023-07-24

## 2023-07-24 NOTE — PROGRESS NOTES
Pharmacy Progress Note - Diabetes Management       Assessment / Plan:   Diabetes Management:  Per ADA guidelines, Pt's A1c is not at goal of < 7%. Pt's basal control is poor and will need an increase in his [place] dose. Will increase Tresiba to 30 units qam.  Encouraged to restart this. His prandial control is more controlled with Trulicity on board, but he continues to have some prolonged elevations. He would benefit from a dose increase to 1.5mg weekly, but will hold off as pt may be stopping it on his own pending his ER visit. Will reassess with CGM data in 2 weeks. Iron Deficiency:  Advised pt to go to the ER for possible need for blood transfusion again. Nutrition/Lifestyle Modifications:  - Educated pt on the importance of moderating carbohydrate intake. Reviewed sources of carbohydrates and method to help determine appropriate portion sizes (e.g., Diabetes Plate Method). - Advised patient to avoid sugar-sweetened beverages and replace with water or diet/zero sugar option.  - Recommend ~30 minutes consistent, moderately intensive, exercise/day or ~150 minutes/week. Start small, stay consistent, and increase length and types of exercise, as tolerated. Patient will return to clinic in 2 week(s) for follow up. S/O: Mr. Gen Moralez., a 68 y.o. 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 Pt was seen today for diabetes management.   Patient's last A1c was:   Hemoglobin A1C   Date Value Ref Range Status   05/22/2023 9.0 (H) 4.8 - 5.6 % Final     Comment:              Prediabetes: 5.7 - 6.4           Diabetes: >6.4           Glycemic control for adults with

## 2023-07-26 ENCOUNTER — TELEPHONE (OUTPATIENT)
Age: 78
End: 2023-07-26

## 2023-07-26 RX ORDER — LISINOPRIL 40 MG/1
40 TABLET ORAL DAILY
Qty: 90 TABLET | Refills: 3 | Status: SHIPPED | OUTPATIENT
Start: 2023-07-26

## 2023-07-26 NOTE — TELEPHONE ENCOUNTER
Per pt per [facility] they need a new RX for  Lisinopril 40 mg     Pt was advised he should have a refill available.  He was aware, said he told [facility] that but they insist new RX needed/ said they had contacted his provider

## 2023-07-27 ENCOUNTER — TELEPHONE (OUTPATIENT)
Age: 78
End: 2023-07-27

## 2023-07-27 NOTE — TELEPHONE ENCOUNTER
[address] Nimesh Mckee, a registered nurse  with [address] [place] [address], called and states that patient was recently discharged from the hospital and was referred to them for home health. He states they faxed over some paperwork this morning , requesting for Dr Sara Kat to review. He is requesting a return call from a nurse and can be reached at [phone]. Please advise, thank you.

## 2023-07-27 NOTE — TELEPHONE ENCOUNTER
Pharmacy Progress Note - Telephone Call    Mr. Julissa Garcia. 68 y.o. was contacted via an outbound telephone call today to discuss his concerns with continuing the Trulicity. He was advised to continue taking Trulicity as directed. He states that everything came back wnl at the hospital.  He hasn't had any issues since then in regards to his heart beating \"[employer]. \"    Thank you,    Arthur Dawn, PharmD, BCACP, BC-ADM

## 2023-07-27 NOTE — TELEPHONE ENCOUNTER
Patient called requesting to speak with Dr Bob Todd directly regarding his prescription for Trulicity. Patient can be reached at [phone]. Please advise, thank you.

## 2023-07-28 ENCOUNTER — TELEPHONE (OUTPATIENT)
Age: 78
End: 2023-07-28

## 2023-07-28 NOTE — TELEPHONE ENCOUNTER
Kim from [facility] called stating orders were faxed over yesterday , the Dr signed it but no date was put on them , she is asking if they can be resent with signature and date   FAX# [phone]

## 2023-08-01 NOTE — PROGRESS NOTES
More Mandel. presents today for a post-hospital follow-up. He was hospitalized from 7/10/23 through 7/12/23 for chronic GI bleed and acute on chronic anemia. He presented with dark colored stools but had been taking Pepto Bismol daily for about a week prior to admission. He also complained of shortness of breath and fatigue. His Hgb was 6.9 on 7/10/23, down some from 8.3 on 6/23/23. His troponins were elevated but it was not felt due to be due to ischemia. His troponins are chronically elevated. He was followed by Gastroenterology but since he had a full GI work-up  with EGD & colonoscopy done on 1/5/23 by Dr. Elina Zaumdio, he was asked to follow-up with his normal gastroenterologist after discharge. The GI work-up done in Jan. 2023 showed a small sliding HH, Schatzki's ring, HP neg gastritis, small HP gastric polyp, non-bleeding internal & external hemorrhoids, & two small TA polyps in the transverse colon. He went to the ER on 7/24/23 for complaints of palpitations. He thought that the Trulicity was causing the palpitations so it was discontinued. His granddaughter states that he was experiencing palpitations before he began using the Trulicity. He states that his Plavix was discontinued due to GI bleed and chronic anemia. He also states that he is no longer taking metformin. He is a 68year old male with history of hypertension, insulin dependent diabetes (has a CGM), dyslipidemia, and PVD (occluded left ICA and severely diseased right ICA (s/p right CEA). He also has history of chronic GI bleed as well as chronic anemia. He had a renal duplex study done on 1/24/22 and it showed no significant stenosis of the renal arteries. An echo was done on 3/7/22 and it showed an EF of 55-60%, normal wall motion. A nuclear stress test was done on 3/11/22 and it demonstrated normal perfusion and no ischemia.      When seen by Dr. Henny Taylor on 6/6/23, he requested a renal duplex study to rule out renal

## 2023-08-04 NOTE — PROGRESS NOTES
regimen includes:  Current Outpatient Medications   Medication Sig    lisinopril (PRINIVIL;ZESTRIL) 40 MG tablet Take 1 tablet by mouth daily    Insulin Degludec (TRESIBA FLEXTOUCH) 200 UNIT/ML SOPN Inject 30 Units into the skin every morning    Dulaglutide (TRULICITY) 5.84 WF/4.1LI SOPN Inject 0.75 mg into the skin once a week    lansoprazole (PREVACID) 30 MG delayed release capsule Take 1 capsule by mouth every morning (before breakfast)    hydroCHLOROthiazide (HYDRODIURIL) 25 MG tablet Take 1 tablet by mouth every morning    carvedilol (COREG) 25 MG tablet Take 1 tablet by mouth 2 times daily    amLODIPine (NORVASC) 5 MG tablet Take 1 tablet by mouth once daily (Patient taking differently: Take 1 tablet by mouth daily Take 1 tablet by mouth once daily)    Lancets MISC 1 each    acetaminophen (TYLENOL) 500 MG tablet Take 1 tablet by mouth every 6 hours as needed    albuterol sulfate HFA (PROVENTIL;VENTOLIN;PROAIR) 108 (90 Base) MCG/ACT inhaler Inhale 2 puffs into the lungs every 4 hours as needed    aspirin 81 MG EC tablet Take 1 tablet by mouth daily    atorvastatin (LIPITOR) 40 MG tablet Take 1 tablet by mouth daily    vitamin D3 (CHOLECALCIFEROL) 125 MCG (5000 UT) TABS tablet Take 1 tablet by mouth daily    clopidogrel (PLAVIX) 75 MG tablet Take 1 tablet by mouth daily    metFORMIN (GLUCOPHAGE) 500 MG tablet Take 1 tablet by mouth 2 times daily     No current facility-administered medications for this visit. Allergies:   Allergies   Allergen Reactions    Latex      Other reaction(s): rash/itching    Iron Sucrose      Other reaction(s): Unknown (comments)    Furosemide Palpitations       Blood Glucose Monitoring (BGM) or CGM:      ROS:  Today, Pt endorses:  - Symptoms of Hyperglycemia: none  - Symptoms of Hypoglycemia: none    Lifestyle modification(s):  - none    Medication Adherence/Access:  - Endorses adherence to current regimen?: No    Vitals/Labs:  No results found for: HBA1C  Hemoglobin A1C   Date

## 2023-08-07 ENCOUNTER — OFFICE VISIT (OUTPATIENT)
Age: 78
End: 2023-08-07
Payer: COMMERCIAL

## 2023-08-07 ENCOUNTER — PHARMACY VISIT (OUTPATIENT)
Age: 78
End: 2023-08-07

## 2023-08-07 VITALS
HEART RATE: 71 BPM | BODY MASS INDEX: 30.13 KG/M2 | OXYGEN SATURATION: 98 % | SYSTOLIC BLOOD PRESSURE: 160 MMHG | DIASTOLIC BLOOD PRESSURE: 72 MMHG | WEIGHT: 192 LBS | HEIGHT: 67 IN

## 2023-08-07 DIAGNOSIS — E78.2 MIXED HYPERLIPIDEMIA: ICD-10-CM

## 2023-08-07 DIAGNOSIS — Z79.4 TYPE 2 DIABETES MELLITUS WITH HYPERGLYCEMIA, WITH LONG-TERM CURRENT USE OF INSULIN (HCC): Primary | ICD-10-CM

## 2023-08-07 DIAGNOSIS — I10 ESSENTIAL HYPERTENSION: Primary | ICD-10-CM

## 2023-08-07 DIAGNOSIS — R00.2 PALPITATIONS: ICD-10-CM

## 2023-08-07 DIAGNOSIS — E11.65 TYPE 2 DIABETES MELLITUS WITH HYPERGLYCEMIA, WITH LONG-TERM CURRENT USE OF INSULIN (HCC): Primary | ICD-10-CM

## 2023-08-07 PROCEDURE — 1123F ACP DISCUSS/DSCN MKR DOCD: CPT | Performed by: NURSE PRACTITIONER

## 2023-08-07 PROCEDURE — 3077F SYST BP >= 140 MM HG: CPT | Performed by: NURSE PRACTITIONER

## 2023-08-07 PROCEDURE — 99213 OFFICE O/P EST LOW 20 MIN: CPT | Performed by: NURSE PRACTITIONER

## 2023-08-07 PROCEDURE — 93000 ELECTROCARDIOGRAM COMPLETE: CPT | Performed by: NURSE PRACTITIONER

## 2023-08-07 PROCEDURE — 3078F DIAST BP <80 MM HG: CPT | Performed by: NURSE PRACTITIONER

## 2023-08-07 RX ORDER — INSULIN DEGLUDEC 200 U/ML
INJECTION, SOLUTION SUBCUTANEOUS
Qty: 27 ML | Refills: 3 | Status: SHIPPED | OUTPATIENT
Start: 2023-08-07

## 2023-08-07 RX ORDER — AMLODIPINE BESYLATE 10 MG/1
10 TABLET ORAL DAILY
COMMUNITY
End: 2023-08-09

## 2023-08-07 ASSESSMENT — PATIENT HEALTH QUESTIONNAIRE - PHQ9
SUM OF ALL RESPONSES TO PHQ9 QUESTIONS 1 & 2: 0
SUM OF ALL RESPONSES TO PHQ QUESTIONS 1-9: 0
2. FEELING DOWN, DEPRESSED OR HOPELESS: 0
SUM OF ALL RESPONSES TO PHQ QUESTIONS 1-9: 0
SUM OF ALL RESPONSES TO PHQ QUESTIONS 1-9: 0
1. LITTLE INTEREST OR PLEASURE IN DOING THINGS: 0
SUM OF ALL RESPONSES TO PHQ QUESTIONS 1-9: 0

## 2023-08-07 ASSESSMENT — ENCOUNTER SYMPTOMS
NAUSEA: 0
CHEST TIGHTNESS: 0
COUGH: 0
ABDOMINAL DISTENTION: 0
BLOOD IN STOOL: 0
WHEEZING: 0
SHORTNESS OF BREATH: 1
VOMITING: 0
DIARRHEA: 0
CONSTIPATION: 0

## 2023-08-07 NOTE — PATIENT INSTRUCTIONS
3 week event monitor ; Dx: palpitations  Echocardiogram; Dx: palpitations  Dr. Vahe Scott ordered a renal duplex study in June. It has not been scheduled. Please call Central Scheduling at [phone] to schedule your test  Follow-up with Dr. Vahe Scott as scheduled and as needed  Continue to monitor your blood pressures at home - check it when you first wake up (before you take your medications) and then check it again, 3 hours after taking the morning medications.   If BP are consistently higher than 140/90 (after you take your medications), please call the office  Low sodium diet, 2000mg per day

## 2023-08-07 NOTE — PATIENT INSTRUCTIONS
- Increase Tresiba to 36 units every morning   * Increase by 1 unit every day until your morning blood glucose is < 140mg/dL    ** Give 6 units when you get home to start the increased dose today    - Stop Humalog

## 2023-08-07 NOTE — PROGRESS NOTES
El Cummings. presents today for   Chief Complaint   Patient presents with    ED Follow-up     ED f/u 7/24/23 due to palps, CKD, chronic anemia and chronic HTN     Palpitations     Racing palps on/off but daily     Shortness of Breath     SOB with exertion     Dizziness     Dizzy spells this AM        El Ny. preferred language for health care discussion is english/other. Is someone accompanying this pt? YES    Is the patient using any DME equipment during OV? NO    Depression Screening:  Depression: Not at risk    PHQ-2 Score: 0        Learning Assessment:  Who is the primary learner? Patient    What is the preferred language for health care of the primary learner? ENGLISH    How does the primary learner prefer to learn new concepts? DEMONSTRATION    Answered By PATIENT    Relationship to Learner SELF           Pt currently taking Anticoagulant therapy? NO    Pt currently taking Antiplatelet therapy ? ASA 81 MG 1X DAILY AND PLAVIX 75 MG 1X DAILY       Coordination of Care:  1. Have you been to the ER, urgent care clinic since your last visit? Hospitalized since your last visit? YES    2. Have you seen or consulted any other health care providers outside of the [address] [place] [address] since your last visit? Include any pap smears or colon screening.  NO

## 2023-08-09 ENCOUNTER — TELEPHONE (OUTPATIENT)
Age: 78
End: 2023-08-09

## 2023-08-09 RX ORDER — AMLODIPINE BESYLATE 5 MG/1
5 TABLET ORAL 2 TIMES DAILY
Qty: 180 TABLET | Refills: 3 | Status: SHIPPED | OUTPATIENT
Start: 2023-08-09

## 2023-08-09 NOTE — TELEPHONE ENCOUNTER
Please send refill on Amlodipine to [facility]. Patient stating he takes 1 by mouth twice a day.

## 2023-08-16 LAB
ALBUMIN SERPL-MCNC: 4.6 G/DL (ref 3.8–4.8)
ALBUMIN/GLOB SERPL: 2.2 {RATIO} (ref 1.2–2.2)
ALP SERPL-CCNC: 71 IU/L (ref 44–121)
ALT SERPL-CCNC: 14 IU/L (ref 0–44)
AST SERPL-CCNC: 14 IU/L (ref 0–40)
BILIRUB SERPL-MCNC: <0.2 MG/DL (ref 0–1.2)
BUN SERPL-MCNC: 37 MG/DL (ref 8–27)
BUN/CREAT SERPL: 18 (ref 10–24)
CALCIUM SERPL-MCNC: 9.2 MG/DL (ref 8.6–10.2)
CHLORIDE SERPL-SCNC: 104 MMOL/L (ref 96–106)
CHOLEST SERPL-MCNC: 141 MG/DL (ref 100–199)
CO2 SERPL-SCNC: 20 MMOL/L (ref 20–29)
CREAT SERPL-MCNC: 2.01 MG/DL (ref 0.76–1.27)
EGFRCR SERPLBLD CKD-EPI 2021: 34 ML/MIN/1.73
GLOBULIN SER CALC-MCNC: 2.1 G/DL (ref 1.5–4.5)
GLUCOSE SERPL-MCNC: 120 MG/DL (ref 70–99)
HBA1C MFR BLD: 8.7 % (ref 4.8–5.6)
HDLC SERPL-MCNC: 55 MG/DL
LDLC SERPL CALC-MCNC: 72 MG/DL (ref 0–99)
POTASSIUM SERPL-SCNC: 5.5 MMOL/L (ref 3.5–5.2)
PROT SERPL-MCNC: 6.7 G/DL (ref 6–8.5)
SODIUM SERPL-SCNC: 137 MMOL/L (ref 134–144)
SPECIMEN STATUS REPORT: NORMAL
TRIGL SERPL-MCNC: 67 MG/DL (ref 0–149)
VLDLC SERPL CALC-MCNC: 14 MG/DL (ref 5–40)

## 2023-08-18 ENCOUNTER — TELEPHONE (OUTPATIENT)
Age: 78
End: 2023-08-18

## 2023-08-18 NOTE — TELEPHONE ENCOUNTER
Please refill and send to [facility], [address] [place] [address],[address] [phone] Lisset Whitt [phone]    Patient would like a 3 month supply        benzonatate (TESSALON) 200 MG capsule

## 2023-08-20 RX ORDER — BENZONATATE 200 MG/1
200 CAPSULE ORAL 3 TIMES DAILY PRN
Qty: 90 CAPSULE | Refills: 1 | Status: SHIPPED | OUTPATIENT
Start: 2023-08-20 | End: 2023-10-19

## 2023-08-22 ENCOUNTER — TELEPHONE (OUTPATIENT)
Age: 78
End: 2023-08-22

## 2023-08-22 NOTE — TELEPHONE ENCOUNTER
Nurse Rj Andrea is calling from [facility] asking for parameters for holding bp meds.

## 2023-08-23 ENCOUNTER — PHARMACY VISIT (OUTPATIENT)
Age: 78
End: 2023-08-23

## 2023-08-23 DIAGNOSIS — Z79.4 TYPE 2 DIABETES MELLITUS WITH HYPERGLYCEMIA, WITH LONG-TERM CURRENT USE OF INSULIN (HCC): Primary | ICD-10-CM

## 2023-08-23 DIAGNOSIS — E11.65 TYPE 2 DIABETES MELLITUS WITH HYPERGLYCEMIA, WITH LONG-TERM CURRENT USE OF INSULIN (HCC): Primary | ICD-10-CM

## 2023-08-23 RX ORDER — INSULIN DEGLUDEC 200 U/ML
INJECTION, SOLUTION SUBCUTANEOUS
Qty: 27 ML | Refills: 3 | Status: SHIPPED
Start: 2023-08-23

## 2023-08-23 RX ORDER — ATORVASTATIN CALCIUM 40 MG/1
TABLET, FILM COATED ORAL
Qty: 90 TABLET | Refills: 3 | Status: SHIPPED | OUTPATIENT
Start: 2023-08-23

## 2023-08-23 NOTE — PROGRESS NOTES
Pharmacy Progress Note - Diabetes Management       Assessment / Plan:   Diabetes Management:  Per ADA guidelines, Pt's A1c is not at goal of < 7%. Pt's basal control continues to be poor. However, there has been improvement over the last week with his dose increases of [place]. His prandial control seems to be adequate despite not having Humalog or a GLP-1RA on board. Will increase his [place] to 46 units qam and have him continue to increase by 1 unit every day until his FBG is consistently < 140 mg/dL. Will reassess with CGM data in 4 weeks. Nutrition/Lifestyle Modifications:  - Educated pt on the importance of moderating carbohydrate intake. Reviewed sources of carbohydrates and method to help determine appropriate portion sizes (e.g., Diabetes Plate Method). - Advised patient to avoid sugar-sweetened beverages and replace with water or diet/zero sugar option.  - Recommend ~30 minutes consistent, moderately intensive, exercise/day or ~150 minutes/week. Start small, stay consistent, and increase length and types of exercise, as tolerated. Patient will return to clinic in 4 week(s) for follow up. S/O: Mr. Jose Olivares, a 68 y.o. 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 Pt was seen today for diabetes management. Patient's last A1c was:   Hemoglobin A1C   Date Value Ref Range Status   08/15/2023 8.7 (H) 4.8 - 5.6 % Final     Comment:              Prediabetes: 5.7 - 6.4           Diabetes: >6.4           Glycemic control for adults with diabetes: <7.0         Interim update: Pt was last seen by me on 8/7/2023. Per my prior note: Pt's A1c is not at goal of < 7%.

## 2023-08-23 NOTE — TELEPHONE ENCOUNTER
PCP:  REY GIRON MD    Last refill per chart:  atorvastatin (LIPITOR) 40 MG tablet 40 mg, Oral, DAILY Edit       Summary: Take 1 tablet by mouth daily   Dose, Frequency: 40 mg, DAILY  Start: 10/7/2022  Ord/Sold: 1/9/2023 (O)  Report  Taking:   Long-term:   Med Dose History       Patient Sig: Take 1 tablet by mouth daily       Ordered on: 1/9/2023          Future Appointments   Date Time Provider [address] [place] [address]   8/23/2023 11:30 AM Laurita Gil [facility] BS AMB   9/1/2023 10:20 AM Iban Gutierrez MD [facility] BS AMB   10/12/2023  9:00 AM [facility] BS AMB   12/8/2023  9:20 AM Austin Nath MD [facility] BS AMB

## 2023-08-23 NOTE — PATIENT INSTRUCTIONS
- Increase Tresiba to 46 units every morning and increase by 1 unit every day until your morning blood glucose is less than 140 mg/dL.

## 2023-09-01 ENCOUNTER — OFFICE VISIT (OUTPATIENT)
Age: 78
End: 2023-09-01
Payer: COMMERCIAL

## 2023-09-01 ENCOUNTER — TELEPHONE (OUTPATIENT)
Age: 78
End: 2023-09-01

## 2023-09-01 VITALS
RESPIRATION RATE: 20 BRPM | BODY MASS INDEX: 31.55 KG/M2 | DIASTOLIC BLOOD PRESSURE: 59 MMHG | SYSTOLIC BLOOD PRESSURE: 150 MMHG | HEIGHT: 67 IN | WEIGHT: 201 LBS | TEMPERATURE: 98 F | HEART RATE: 57 BPM | OXYGEN SATURATION: 99 %

## 2023-09-01 DIAGNOSIS — E11.65 TYPE 2 DIABETES MELLITUS WITH HYPERGLYCEMIA, WITH LONG-TERM CURRENT USE OF INSULIN (HCC): Primary | ICD-10-CM

## 2023-09-01 DIAGNOSIS — F41.9 ANXIETY: ICD-10-CM

## 2023-09-01 DIAGNOSIS — I10 ESSENTIAL (PRIMARY) HYPERTENSION: ICD-10-CM

## 2023-09-01 DIAGNOSIS — E78.2 MIXED HYPERLIPIDEMIA: ICD-10-CM

## 2023-09-01 DIAGNOSIS — Z79.4 TYPE 2 DIABETES MELLITUS WITH HYPERGLYCEMIA, WITH LONG-TERM CURRENT USE OF INSULIN (HCC): Primary | ICD-10-CM

## 2023-09-01 LAB — HBA1C MFR BLD: 8.3 %

## 2023-09-01 PROCEDURE — 3077F SYST BP >= 140 MM HG: CPT | Performed by: INTERNAL MEDICINE

## 2023-09-01 PROCEDURE — 3052F HG A1C>EQUAL 8.0%<EQUAL 9.0%: CPT | Performed by: INTERNAL MEDICINE

## 2023-09-01 PROCEDURE — 3078F DIAST BP <80 MM HG: CPT | Performed by: INTERNAL MEDICINE

## 2023-09-01 PROCEDURE — 83036 HEMOGLOBIN GLYCOSYLATED A1C: CPT | Performed by: INTERNAL MEDICINE

## 2023-09-01 PROCEDURE — 99214 OFFICE O/P EST MOD 30 MIN: CPT | Performed by: INTERNAL MEDICINE

## 2023-09-01 PROCEDURE — 1123F ACP DISCUSS/DSCN MKR DOCD: CPT | Performed by: INTERNAL MEDICINE

## 2023-09-01 RX ORDER — LORAZEPAM 0.5 MG/1
0.5 TABLET ORAL NIGHTLY PRN
Qty: 30 TABLET | Refills: 2 | Status: SHIPPED | OUTPATIENT
Start: 2023-09-01 | End: 2023-11-30

## 2023-09-01 NOTE — TELEPHONE ENCOUNTER
----- Message from Mariel Gabriel MD sent at 9/1/2023 11:27 AM EDT -----  Pt needs labs 1 week before OV in 3 months

## 2023-09-12 ENCOUNTER — OFFICE VISIT (OUTPATIENT)
Age: 78
End: 2023-09-12
Payer: COMMERCIAL

## 2023-09-12 VITALS
OXYGEN SATURATION: 98 % | SYSTOLIC BLOOD PRESSURE: 124 MMHG | HEIGHT: 67 IN | HEART RATE: 66 BPM | DIASTOLIC BLOOD PRESSURE: 62 MMHG | BODY MASS INDEX: 31.08 KG/M2 | WEIGHT: 198 LBS

## 2023-09-12 DIAGNOSIS — I10 ESSENTIAL HYPERTENSION: ICD-10-CM

## 2023-09-12 DIAGNOSIS — R00.2 PALPITATIONS: Primary | ICD-10-CM

## 2023-09-12 DIAGNOSIS — R06.02 SHORTNESS OF BREATH: ICD-10-CM

## 2023-09-12 PROCEDURE — 1123F ACP DISCUSS/DSCN MKR DOCD: CPT | Performed by: NURSE PRACTITIONER

## 2023-09-12 PROCEDURE — 93000 ELECTROCARDIOGRAM COMPLETE: CPT | Performed by: NURSE PRACTITIONER

## 2023-09-12 PROCEDURE — 3078F DIAST BP <80 MM HG: CPT | Performed by: NURSE PRACTITIONER

## 2023-09-12 PROCEDURE — 3074F SYST BP LT 130 MM HG: CPT | Performed by: NURSE PRACTITIONER

## 2023-09-12 PROCEDURE — 99214 OFFICE O/P EST MOD 30 MIN: CPT | Performed by: NURSE PRACTITIONER

## 2023-09-12 RX ORDER — MECLIZINE HYDROCHLORIDE 25 MG/1
25 TABLET ORAL 3 TIMES DAILY PRN
COMMUNITY

## 2023-09-12 RX ORDER — FERROUS SULFATE 325(65) MG
325 TABLET ORAL
COMMUNITY

## 2023-09-12 ASSESSMENT — PATIENT HEALTH QUESTIONNAIRE - PHQ9
SUM OF ALL RESPONSES TO PHQ QUESTIONS 1-9: 0
SUM OF ALL RESPONSES TO PHQ9 QUESTIONS 1 & 2: 0
SUM OF ALL RESPONSES TO PHQ QUESTIONS 1-9: 0
2. FEELING DOWN, DEPRESSED OR HOPELESS: 0
SUM OF ALL RESPONSES TO PHQ QUESTIONS 1-9: 0
SUM OF ALL RESPONSES TO PHQ QUESTIONS 1-9: 0
1. LITTLE INTEREST OR PLEASURE IN DOING THINGS: 0

## 2023-09-12 ASSESSMENT — ENCOUNTER SYMPTOMS
NAUSEA: 0
VOMITING: 0
DIARRHEA: 0
SHORTNESS OF BREATH: 1
COUGH: 0
ABDOMINAL DISTENTION: 0
BLOOD IN STOOL: 0
CHEST TIGHTNESS: 0
CONSTIPATION: 0
WHEEZING: 0

## 2023-09-12 NOTE — PROGRESS NOTES
Loreli Route. presents today for   Chief Complaint   Patient presents with    Follow-up     Paps not being able to sleep at night       Loreli Route. preferred language for health care discussion is english/other. Is someone accompanying this pt? no    Is the patient using any DME equipment during OV? no    Depression Screening:  Depression: Not at risk (9/12/2023)    PHQ-2     PHQ-2 Score: 0        Learning Assessment:  Who is the primary learner? Patient    What is the preferred language for health care of the primary learner? ENGLISH    How does the primary learner prefer to learn new concepts? DEMONSTRATION    Answered By patient    Relationship to Learner SELF           Pt currently taking Anticoagulant therapy? no    Pt currently taking Antiplatelet therapy ? aspirin      Coordination of Care:  1. Have you been to the ER, urgent care clinic since your last visit? Hospitalized since your last visit? no    2. Have you seen or consulted any other health care providers outside of the [address] [place] [address] since your last visit? Include any pap smears or colon screening.  no
granddaughter and asked her to bring him to the ER. ER evaluation showed NSR without ectopy. His electrolytes were within normal limits. Chest x-ray showed no acute processes. Results of his recent event monitor were discussed with him and his granddaughter. Made aware that the event monitor showed NSR with occasional PACs and PVCs with less than 1% burden for both. I explained what these ectopics are. He is already on carvedilol. Reassurance given. His granddaughter believes that his anxiety makes him notice it more. He states that he has difficulty sleeping which she feels is also related to his anxiety. He lives alone and has a CGM and he is afraid that if he sleeps soundly, he will not hear the alarm on his CGM warning him of a low blood sugar level. She states that his Hgb A1c is not well controlled. Most recent was 8 or 9. She also states that he is working with his PCP and a pharmacist to find the \"right\" diabetes medication for him. Unfortunately, she states that he does not follow the recommended dietary adjustments for his diabetes. No changes were made to his medications. Time:  30 minutes (explaining palpitations, event monitor results, providing reassurance)    He will follow-up with Dr. Fatuma Sanchez as scheduled and as needed. Bria Monreal MSN, FNP-BC    Please note:  Portions of this chart were created with Dragon medical speech to text program.  Unrecognized errors may be present.

## 2023-09-18 ENCOUNTER — TELEPHONE (OUTPATIENT)
Age: 78
End: 2023-09-18

## 2023-09-18 NOTE — TELEPHONE ENCOUNTER
Patient's daughter-in-law came by and states that patient was prescribed something for his anxiety, she is unsure of the name, but it is not helping, states patient has been unable to sleep due to his anxiety. They wanted to know if he needs to increase the dosage of the medication or if there is something else patient needs to do. Please advise, thank you.

## 2023-09-18 NOTE — PROGRESS NOTES
Pharmacy Progress Note - Diabetes Management       Assessment / Plan:   Diabetes Management:  Per ADA guidelines, Pt's A1c is not at goal of < 7%. Pt's basal control continues to be poor likely from not increasing his Tresiba dose as prescribed to 46 units. However, he agrees to restart Trulicity now that he has continued to have his erratic HR issues despite no longer being on it. Will restart Trulicity 4.19TX weekly. Will maintain his Tresiba 42 units qam to avoid potential hypoglycemia with the restart of Trulicity. Will reassess with CGM data in 3 weeks. Nutrition/Lifestyle Modifications:  - Educated pt on the importance of moderating carbohydrate intake. Reviewed sources of carbohydrates and method to help determine appropriate portion sizes (e.g., Diabetes Plate Method). - Advised patient to avoid sugar-sweetened beverages and replace with water or diet/zero sugar option.  - Recommend ~30 minutes consistent, moderately intensive, exercise/day or ~150 minutes/week. Start small, stay consistent, and increase length and types of exercise, as tolerated. Patient will return to clinic in 3 week(s) for follow up. S/O: Mr. Tara Santos., a 68 y.o. 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 Pt was seen today for diabetes management.   Patient's last A1c was:   Hemoglobin A1C   Date Value Ref Range Status   08/15/2023 8.7 (H) 4.8 - 5.6 % Final     Comment:              Prediabetes: 5.7 - 6.4           Diabetes: >6.4           Glycemic control for adults with diabetes: <7.0       Hemoglobin A1C, POC   Date Value Ref Range Status   09/01/2023 8.3 % Final       Interim

## 2023-09-19 RX ORDER — BUSPIRONE HYDROCHLORIDE 10 MG/1
10 TABLET ORAL 3 TIMES DAILY
Qty: 90 TABLET | Refills: 0 | Status: SHIPPED | OUTPATIENT
Start: 2023-09-19 | End: 2023-10-19

## 2023-09-19 NOTE — TELEPHONE ENCOUNTER
Spoke with Ms. Tika Almanza (Daughter - in - law) she states to send medication to [facility] on [address]. Ms. Carley Napiersalena also wanted to know if he can still take Lorazepam at bedtime.

## 2023-09-20 ENCOUNTER — PHARMACY VISIT (OUTPATIENT)
Age: 78
End: 2023-09-20

## 2023-09-20 DIAGNOSIS — Z79.4 TYPE 2 DIABETES MELLITUS WITH HYPERGLYCEMIA, WITH LONG-TERM CURRENT USE OF INSULIN (HCC): Primary | ICD-10-CM

## 2023-09-20 DIAGNOSIS — E11.65 TYPE 2 DIABETES MELLITUS WITH HYPERGLYCEMIA, WITH LONG-TERM CURRENT USE OF INSULIN (HCC): Primary | ICD-10-CM

## 2023-09-20 RX ORDER — DULAGLUTIDE 0.75 MG/.5ML
0.75 INJECTION, SOLUTION SUBCUTANEOUS WEEKLY
Qty: 2 ML | Refills: 1 | Status: SHIPPED | OUTPATIENT
Start: 2023-09-20

## 2023-09-20 RX ORDER — INSULIN DEGLUDEC 200 U/ML
INJECTION, SOLUTION SUBCUTANEOUS
Qty: 27 ML | Refills: 3 | Status: SHIPPED
Start: 2023-09-20

## 2023-10-09 NOTE — PROGRESS NOTES
Pharmacy Progress Note - Diabetes Management       Assessment / Plan:   Diabetes Management:  Per ADA guidelines, Pt's A1c is not at goal of < 7%. Pt's glycemic control is poor from his CGM data. His primary issue is with his prandial control. Unable to adjust his [place] d/t the occasional hypoglycemia typically in early morning. Pt will need tx for prandial control. Since he is unable to tolerate the Trulicity, will attempt another GLP-1RA, Ozempic. Will start Ozempic 0.25mg weekly and maintain this dose to determine if he will be able to tolerate it without exacerbating his palpitations. Will reassess with CGM data in 3 weeks. Advised pt to contact clinic if any issues arise after the start of Ozempic and he verbalized understanding. Nutrition/Lifestyle Modifications:  - Educated pt on the importance of moderating carbohydrate intake. Reviewed sources of carbohydrates and method to help determine appropriate portion sizes (e.g., Diabetes Plate Method). - Advised patient to avoid sugar-sweetened beverages and replace with water or diet/zero sugar option.  - Recommend ~30 minutes consistent, moderately intensive, exercise/day or ~150 minutes/week. Start small, stay consistent, and increase length and types of exercise, as tolerated. Patient will return to clinic in 3 week(s) for follow up. S/O: Mr. Eris Bales., a 68 y.o. 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 Pt was seen today for diabetes management.   Patient's last A1c was:   Hemoglobin A1C   Date Value Ref Range Status   08/15/2023 8.7 (H) 4.8 - 5.6 % Final     Comment:              Prediabetes: 5.7

## 2023-10-11 ENCOUNTER — PHARMACY VISIT (OUTPATIENT)
Age: 78
End: 2023-10-11

## 2023-10-11 DIAGNOSIS — Z79.4 TYPE 2 DIABETES MELLITUS WITH HYPERGLYCEMIA, WITH LONG-TERM CURRENT USE OF INSULIN (HCC): Primary | ICD-10-CM

## 2023-10-11 DIAGNOSIS — E11.65 TYPE 2 DIABETES MELLITUS WITH HYPERGLYCEMIA, WITH LONG-TERM CURRENT USE OF INSULIN (HCC): Primary | ICD-10-CM

## 2023-10-11 RX ORDER — SEMAGLUTIDE 0.68 MG/ML
0.25 INJECTION, SOLUTION SUBCUTANEOUS
Qty: 3 ML | Refills: 1 | Status: SHIPPED | OUTPATIENT
Start: 2023-10-11

## 2023-10-16 ENCOUNTER — TELEPHONE (OUTPATIENT)
Age: 78
End: 2023-10-16

## 2023-10-16 DIAGNOSIS — H90.0 CONDUCTIVE HEARING LOSS, BILATERAL: Primary | ICD-10-CM

## 2023-10-16 NOTE — TELEPHONE ENCOUNTER
Patient called to request a referral for ENT, states there is something wrong with his ears and he wants to talk with someone about it as soon as possible. Tried to get more information, he was unable to explain what his symptoms are, he did state they might need to cleaned but he is unsure. He is requesting a return call from a nurse and can be reached at [phone]. Please advise, thank you.

## 2023-10-23 ENCOUNTER — TELEPHONE (OUTPATIENT)
Age: 78
End: 2023-10-23

## 2023-10-23 DIAGNOSIS — E11.65 TYPE 2 DIABETES MELLITUS WITH HYPERGLYCEMIA, WITH LONG-TERM CURRENT USE OF INSULIN (HCC): Primary | ICD-10-CM

## 2023-10-23 DIAGNOSIS — Z79.4 TYPE 2 DIABETES MELLITUS WITH HYPERGLYCEMIA, WITH LONG-TERM CURRENT USE OF INSULIN (HCC): Primary | ICD-10-CM

## 2023-10-23 RX ORDER — SEMAGLUTIDE 0.68 MG/ML
0.5 INJECTION, SOLUTION SUBCUTANEOUS
Qty: 9 ML | Refills: 3 | Status: SHIPPED | OUTPATIENT
Start: 2023-10-23

## 2023-10-23 NOTE — TELEPHONE ENCOUNTER
Patient called requesting if Dr Rodney Davis can give him a call to discuss his diabetic medication he was prescribed , he can be reached at [phone]. Please advise, thank you.

## 2023-10-23 NOTE — TELEPHONE ENCOUNTER
Pharmacy Progress Note - Telephone Call    Mr. Chaim Hatch. 68 y.o. was contacted via an outbound telephone call today to return his call regarding his Ozempic start. He states that he is tolerating it well and would like to have a renewal sent to [facility] so that he can obtain a 3 month supply. He states that he still plans on coming to his next appointment. Will reassess at this appointment on 11/3/23.     Thank you,    Vance Villegas, PharmD, BCACP, BC-[facility]    For Pharmacy Admin Tracking Only    Program: Medical Group  CPA in place:  Yes  Recommendation Provided To: Patient/Caregiver: 2 via Telephone  Intervention Detail: Adherence Monitorin and Refill(s) Provided  Intervention Accepted By: Patient/Caregiver: 2  Gap Closed?: Yes   Time Spent (min): 10

## 2023-10-24 ENCOUNTER — TELEPHONE (OUTPATIENT)
Age: 78
End: 2023-10-24

## 2023-10-24 NOTE — TELEPHONE ENCOUNTER
I called and discussed echocardiogram results with . Kary Rose. Made aware that EF is good at 66%, normal wall motion, and when compared to previous echo, no significant changes. He verbalized his understanding and had no questions.

## 2023-10-25 ENCOUNTER — TELEPHONE (OUTPATIENT)
Age: 78
End: 2023-10-25

## 2023-10-25 RX ORDER — BENZONATATE 200 MG/1
200 CAPSULE ORAL 3 TIMES DAILY PRN
Qty: 90 CAPSULE | Refills: 1 | Status: SHIPPED | OUTPATIENT
Start: 2023-10-25 | End: 2023-12-24

## 2023-10-25 RX ORDER — ALBUTEROL SULFATE 90 UG/1
2 AEROSOL, METERED RESPIRATORY (INHALATION) EVERY 4 HOURS PRN
Qty: 3 EACH | Refills: 3 | Status: SHIPPED | OUTPATIENT
Start: 2023-10-25

## 2023-10-25 NOTE — TELEPHONE ENCOUNTER
Please refill and send to [facility] [phone]    albuterol sulfate HFA (PROVENTIL;VENTOLIN;PROAIR) 108 (90 Base) MCG/ACT inhaler       benzonatate (TESSALON) 200 MG capsule

## 2023-11-03 ENCOUNTER — PHARMACY VISIT (OUTPATIENT)
Age: 78
End: 2023-11-03

## 2023-11-03 DIAGNOSIS — E11.65 TYPE 2 DIABETES MELLITUS WITH HYPERGLYCEMIA, WITH LONG-TERM CURRENT USE OF INSULIN (HCC): Primary | ICD-10-CM

## 2023-11-03 DIAGNOSIS — Z79.4 TYPE 2 DIABETES MELLITUS WITH HYPERGLYCEMIA, WITH LONG-TERM CURRENT USE OF INSULIN (HCC): Primary | ICD-10-CM

## 2023-11-03 NOTE — PATIENT INSTRUCTIONS
- Inject one more 0.25mg injection of the Ozempic today    - Next week, increase to the 0.5mg weekly dose of Ozempic. You will have one 0.5mg injection left in your current pen. After the 0.5mg injection on 11/10, throw that pen away. Start new pen on 11/17.

## 2023-11-28 LAB
HBA1C MFR BLD: 8.9 % (ref 4.8–5.6)
SPECIMEN STATUS REPORT: NORMAL

## 2023-11-29 LAB
ALBUMIN SERPL-MCNC: 4.5 G/DL (ref 3.8–4.8)
ALBUMIN/GLOB SERPL: 1.9 {RATIO} (ref 1.2–2.2)
ALP SERPL-CCNC: 71 IU/L (ref 44–121)
ALT SERPL-CCNC: 14 IU/L (ref 0–44)
AST SERPL-CCNC: 15 IU/L (ref 0–40)
BILIRUB SERPL-MCNC: 0.3 MG/DL (ref 0–1.2)
BUN SERPL-MCNC: 23 MG/DL (ref 8–27)
BUN/CREAT SERPL: 12 (ref 10–24)
CALCIUM SERPL-MCNC: 9.4 MG/DL (ref 8.6–10.2)
CHLORIDE SERPL-SCNC: 103 MMOL/L (ref 96–106)
CHOLEST SERPL-MCNC: 185 MG/DL (ref 100–199)
CO2 SERPL-SCNC: 19 MMOL/L (ref 20–29)
CREAT SERPL-MCNC: 1.88 MG/DL (ref 0.76–1.27)
EGFRCR SERPLBLD CKD-EPI 2021: 36 ML/MIN/1.73
GLOBULIN SER CALC-MCNC: 2.4 G/DL (ref 1.5–4.5)
GLUCOSE SERPL-MCNC: 176 MG/DL (ref 70–99)
HDLC SERPL-MCNC: 43 MG/DL
LDLC SERPL CALC-MCNC: 110 MG/DL (ref 0–99)
POTASSIUM SERPL-SCNC: 5 MMOL/L (ref 3.5–5.2)
PROT SERPL-MCNC: 6.9 G/DL (ref 6–8.5)
SODIUM SERPL-SCNC: 139 MMOL/L (ref 134–144)
TRIGL SERPL-MCNC: 186 MG/DL (ref 0–149)
VLDLC SERPL CALC-MCNC: 32 MG/DL (ref 5–40)

## 2023-12-01 ENCOUNTER — OFFICE VISIT (OUTPATIENT)
Age: 78
End: 2023-12-01
Payer: COMMERCIAL

## 2023-12-01 VITALS
SYSTOLIC BLOOD PRESSURE: 135 MMHG | HEIGHT: 67 IN | DIASTOLIC BLOOD PRESSURE: 68 MMHG | WEIGHT: 184 LBS | OXYGEN SATURATION: 99 % | HEART RATE: 76 BPM | BODY MASS INDEX: 28.88 KG/M2 | TEMPERATURE: 98.3 F | RESPIRATION RATE: 20 BRPM

## 2023-12-01 DIAGNOSIS — E11.22 TYPE 2 DIABETES MELLITUS WITH STAGE 3B CHRONIC KIDNEY DISEASE, WITH LONG-TERM CURRENT USE OF INSULIN (HCC): Primary | ICD-10-CM

## 2023-12-01 DIAGNOSIS — Z79.4 TYPE 2 DIABETES MELLITUS WITH STAGE 3B CHRONIC KIDNEY DISEASE, WITH LONG-TERM CURRENT USE OF INSULIN (HCC): Primary | ICD-10-CM

## 2023-12-01 DIAGNOSIS — N18.32 TYPE 2 DIABETES MELLITUS WITH STAGE 3B CHRONIC KIDNEY DISEASE, WITH LONG-TERM CURRENT USE OF INSULIN (HCC): Primary | ICD-10-CM

## 2023-12-01 DIAGNOSIS — I10 ESSENTIAL (PRIMARY) HYPERTENSION: ICD-10-CM

## 2023-12-01 DIAGNOSIS — E78.2 MIXED HYPERLIPIDEMIA: ICD-10-CM

## 2023-12-01 PROCEDURE — 1123F ACP DISCUSS/DSCN MKR DOCD: CPT | Performed by: INTERNAL MEDICINE

## 2023-12-01 PROCEDURE — 3074F SYST BP LT 130 MM HG: CPT | Performed by: INTERNAL MEDICINE

## 2023-12-01 PROCEDURE — 3078F DIAST BP <80 MM HG: CPT | Performed by: INTERNAL MEDICINE

## 2023-12-01 PROCEDURE — 3052F HG A1C>EQUAL 8.0%<EQUAL 9.0%: CPT | Performed by: INTERNAL MEDICINE

## 2023-12-01 PROCEDURE — 99214 OFFICE O/P EST MOD 30 MIN: CPT | Performed by: INTERNAL MEDICINE

## 2023-12-01 RX ORDER — BENZONATATE 100 MG/1
100 CAPSULE ORAL 2 TIMES DAILY PRN
Qty: 180 CAPSULE | Refills: 3 | Status: SHIPPED | OUTPATIENT
Start: 2023-12-01

## 2023-12-01 NOTE — PROGRESS NOTES
Harlan Escamilla. presents today for   Chief Complaint   Patient presents with    Diabetes     3 month follow up        Patient is requesting a refill for Tessalon cough pills 100 mg.          1. \"Have you been to the ER, urgent care clinic since your last visit? Hospitalized since your last visit? \" no    2. \"Have you seen or consulted any other health care providers outside of the [address] [place] [address] since your last visit? \" no     3. For patients aged 43-73: Has the patient had a colonoscopy / FIT/ Cologuard? NA - based on age      If the patient is female:    4. For patients aged 43-66: Has the patient had a mammogram within the past 2 years? NA - based on age or sex      11. For patients aged 21-65: Has the patient had a pap smear?  NA - based on age or sex
Subjective:       Chief Complaint  The patient presents for follow up of diabetes, hypertension and high cholesterol. JANIE Newby. is a 66 y.o. male seen for follow up of diabetes. Healso has hypertension and hyperlipidemia. Diabetes status uncontrolled, pt is tolerating Tresiba insulin 42 units and not having any significantly low blood sugars is being followed by Pharm. D who started him on Ozempic and he is now on 0.5 mg/week. , hypertension  borderline controlled,  on lisinopril 40 mg and coreg 25 mg BID, and Norvasc 10 mg and HCTZ . Patient reluctant to add more medications because he is concerned about having low blood sugars. Hyperlipidemia no significant medication side effects noted,  borderline controlled on  Lipitor 40 mg. . He has h/o CVA and right carotid endarterectomy so AIM is LDL<70    Diet and Lifestyle: generally follows a low fat low cholesterol diet, does not rigorously follow a diabetic diet, exercises sporadically    Home BP Monitoring: is controlled at home,    Diabetic Review of Systems - home glucose monitoring: is performed regularly, 3x/day. Other symptoms and concerns: Patient has a history of asbestosis exposure. Consider chest x-ray follow-up in the near future. Discussed the patient's BMI with him. The BMI follow up plan is as follows: I have counseled this patient on diet and exercise regimens. Patient [place] in the past.  It has been over 5 years. Cologuard done June 2021 was negative. At age 76 no further testing needed unless patient has symptoms. Patient has a history of asthma and is doing fairly well on Advair. Patient has a history of TIAs/CVA  in the past and has a completely occluded left carotid artery. This was noted in 2015. Patient to continue on aspirin 81 mg daily and statin. As mentioned above patient had recent right carotid endarterectomy after he had recent CVA.     Patient continues on vitamin D
Yes

## 2023-12-13 ENCOUNTER — TELEPHONE (OUTPATIENT)
Age: 78
End: 2023-12-13

## 2023-12-13 ENCOUNTER — PHARMACY VISIT (OUTPATIENT)
Age: 78
End: 2023-12-13

## 2023-12-13 DIAGNOSIS — Z79.4 TYPE 2 DIABETES MELLITUS WITH STAGE 3B CHRONIC KIDNEY DISEASE, WITH LONG-TERM CURRENT USE OF INSULIN (HCC): Primary | ICD-10-CM

## 2023-12-13 DIAGNOSIS — E11.22 TYPE 2 DIABETES MELLITUS WITH STAGE 3B CHRONIC KIDNEY DISEASE, WITH LONG-TERM CURRENT USE OF INSULIN (HCC): Primary | ICD-10-CM

## 2023-12-13 DIAGNOSIS — N18.32 TYPE 2 DIABETES MELLITUS WITH STAGE 3B CHRONIC KIDNEY DISEASE, WITH LONG-TERM CURRENT USE OF INSULIN (HCC): Primary | ICD-10-CM

## 2023-12-13 RX ORDER — INSULIN DEGLUDEC 200 U/ML
INJECTION, SOLUTION SUBCUTANEOUS
Qty: 27 ML | Refills: 3 | Status: SHIPPED
Start: 2023-12-13

## 2023-12-13 RX ORDER — POLYETHYLENE GLYCOL 3350 17 G/17G
17 POWDER, FOR SOLUTION ORAL DAILY PRN
Qty: 510 G | Refills: 3 | Status: SHIPPED | OUTPATIENT
Start: 2023-12-13 | End: 2024-04-11

## 2023-12-13 NOTE — TELEPHONE ENCOUNTER
Pt called and would like a new prescription for miralax. Pt had it prescribed from a hospital encounter. [facility]

## 2023-12-26 RX ORDER — INSULIN DEGLUDEC 200 U/ML
INJECTION, SOLUTION SUBCUTANEOUS
Qty: 27 ML | Refills: 3 | Status: SHIPPED | OUTPATIENT
Start: 2023-12-26

## 2024-01-02 RX ORDER — FLUTICASONE PROPIONATE 50 MCG
1 SPRAY, SUSPENSION (ML) NASAL DAILY
COMMUNITY
Start: 2021-10-13

## 2024-01-02 NOTE — PERIOP NOTE
Instructions for your procedure at [facility]      Today's Date: 1/2/2024      Patient's Name: Lucio Vance Jr.      Procedure Date: 1/24/2024        Please enter the main entrance of the hospital and check-in at the  located in the lobby.      Do NOT eat or drink anything, including candy, gum, or ice chips after midnight prior to your procedure, unless it is part of your prep.  Brush your teeth before coming to the hospital.You may swish with water, but do not swallow.  No smoking/Vaping/E-Cigarettes 24 hours prior to the day of procedure.  No alcohol 24 hours prior to the day of procedure.  No recreational drugs for one week prior to the day of procedure.  Bring Photo ID, Insurance information, and Co-pay if required on day of procedure.  Bring in pertinent legal documents, such as, Medical Power of , DNR, Advance Directive, etc.  Leave all other valuables, including money/purse, at home.  Remove jewelry, including ALL body piercings, nail polish, acrylic nails, and makeup (including mascara); no lotions, powders, deodorant, and/or perfume/cologne/after shave on the skin.  Glasses and dentures may be worn to the hospital.  They must be removed prior to procedure. Please bring case/container for glasses or dentures.  11. Contacts should not be worn on day of procedure.   12. Call the office ([phone]) if you have symptoms of a cold or illness within 24-48 hours prior to your procedure.   13. AN ADULT (relative or friend 18 years or older) MUST DRIVE YOU HOME AFTER YOUR PROCEDURE.   14. Please make arrangements for a responsible adult (18 years or older) to be with you for 24 hours after your procedure.   15. ONE VISITOR will be allowed in the waiting area during your procedure.       Special Instructions:      Bring list of CURRENT medications.  Follow instructions from the office regarding Bowel Prep, Vitamins, Iron, Blood Thinners, Insulin, Seizure, and Blood

## 2024-01-04 ENCOUNTER — HOSPITAL ENCOUNTER (OUTPATIENT)
Facility: HOSPITAL | Age: 79
End: 2024-01-04
Payer: COMMERCIAL

## 2024-01-04 ENCOUNTER — HOSPITAL ENCOUNTER (OUTPATIENT)
Facility: HOSPITAL | Age: 79
Discharge: HOME | End: 2024-01-04
Payer: COMMERCIAL

## 2024-01-04 DIAGNOSIS — H93.13 TINNITUS, BILATERAL: ICD-10-CM

## 2024-01-04 LAB — CREAT UR-MCNC: 2 MG/DL (ref 0.6–1.3)

## 2024-01-04 PROCEDURE — 70546 MR ANGIOGRAPH HEAD W/O&W/DYE: CPT

## 2024-01-04 PROCEDURE — 6360000004 HC RX CONTRAST MEDICATION: Performed by: STUDENT IN AN ORGANIZED HEALTH CARE EDUCATION/TRAINING PROGRAM

## 2024-01-04 PROCEDURE — 82565 ASSAY OF CREATININE: CPT

## 2024-01-04 PROCEDURE — A9577 INJ MULTIHANCE: HCPCS | Performed by: STUDENT IN AN ORGANIZED HEALTH CARE EDUCATION/TRAINING PROGRAM

## 2024-01-04 PROCEDURE — 70553 MRI BRAIN STEM W/O & W/DYE: CPT

## 2024-01-04 RX ADMIN — GADOBENATE DIMEGLUMINE 20 ML: 529 INJECTION, SOLUTION INTRAVENOUS at 14:31

## 2024-01-05 RX ORDER — CARVEDILOL 25 MG/1
25 TABLET ORAL 2 TIMES DAILY
Qty: 180 TABLET | Refills: 3 | Status: SHIPPED | OUTPATIENT
Start: 2024-01-05

## 2024-01-05 NOTE — TELEPHONE ENCOUNTER
----- Message from Kaylie Clifton sent at 1/5/2024  2:08 PM EST -----  Subject: Refill Request    QUESTIONS  Name of Medication? carvedilol (COREG) 25 MG tablet  Patient-reported dosage and instructions? Twice daily  How many days do you have left? 3  Preferred Pharmacy? [facility]  Pharmacy phone number (if available)? [phone]  ---------------------------------------------------------------------------  --------------  CALL BACK INFO  What is the best way for the office to contact you? OK to leave message on   voicemail  Preferred Call Back Phone Number? [phone]  ---------------------------------------------------------------------------  --------------  SCRIPT ANSWERS  Relationship to Patient? Self

## 2024-01-08 NOTE — PROGRESS NOTES
discontinued medications.    No orders of the defined types were placed in this encounter.      Future Appointments   Date Time Provider Department Center   2024  8:40 AM Juancho Carlos MD [facility] BS AMB   2024 10:00 AM Felipe Roman, [facility] BS AMB   3/1/2024  9:20 AM Bashir Clemens MD [facility] BS AMB       Patient verbalized understanding of the information presented and all of the patient’s questions were answered.  AVS was handed to the patient. Patient advised to call the office with any additional questions or concerns.    Notifications of recommendations will be sent to Bashir Clemens MD for review.      Thank you for the consult,  Felipe Roman, PharmD, BCACP, BC-ADM        For Pharmacy Admin Tracking Only    Program: Medical Group  CPA in place:  Yes  Recommendation Provided To: Patient/Caregiver: 1 via In person  Intervention Detail: Adherence Monitorin  Intervention Accepted By: Patient/Caregiver: 1  Gap Closed?: No   Time Spent (min): 20

## 2024-01-10 ENCOUNTER — PHARMACY VISIT (OUTPATIENT)
Age: 79
End: 2024-01-10

## 2024-01-10 DIAGNOSIS — N18.32 TYPE 2 DIABETES MELLITUS WITH STAGE 3B CHRONIC KIDNEY DISEASE, WITH LONG-TERM CURRENT USE OF INSULIN (HCC): Primary | ICD-10-CM

## 2024-01-10 DIAGNOSIS — E11.22 TYPE 2 DIABETES MELLITUS WITH STAGE 3B CHRONIC KIDNEY DISEASE, WITH LONG-TERM CURRENT USE OF INSULIN (HCC): Primary | ICD-10-CM

## 2024-01-10 DIAGNOSIS — Z79.4 TYPE 2 DIABETES MELLITUS WITH STAGE 3B CHRONIC KIDNEY DISEASE, WITH LONG-TERM CURRENT USE OF INSULIN (HCC): Primary | ICD-10-CM

## 2024-01-17 ENCOUNTER — OFFICE VISIT (OUTPATIENT)
Age: 79
End: 2024-01-17
Payer: COMMERCIAL

## 2024-01-17 VITALS
WEIGHT: 189 LBS | SYSTOLIC BLOOD PRESSURE: 132 MMHG | BODY MASS INDEX: 30.37 KG/M2 | HEIGHT: 66 IN | HEART RATE: 80 BPM | OXYGEN SATURATION: 99 % | DIASTOLIC BLOOD PRESSURE: 74 MMHG

## 2024-01-17 DIAGNOSIS — N30.00 ACUTE CYSTITIS WITHOUT HEMATURIA: ICD-10-CM

## 2024-01-17 DIAGNOSIS — R07.9 CHEST PAIN, UNSPECIFIED TYPE: Primary | ICD-10-CM

## 2024-01-17 DIAGNOSIS — I10 ESSENTIAL HYPERTENSION: ICD-10-CM

## 2024-01-17 DIAGNOSIS — R06.02 SHORTNESS OF BREATH: ICD-10-CM

## 2024-01-17 DIAGNOSIS — K59.00 CONSTIPATION, UNSPECIFIED CONSTIPATION TYPE: ICD-10-CM

## 2024-01-17 DIAGNOSIS — E78.2 MIXED HYPERLIPIDEMIA: ICD-10-CM

## 2024-01-17 DIAGNOSIS — R00.2 PALPITATIONS: ICD-10-CM

## 2024-01-17 PROCEDURE — 3078F DIAST BP <80 MM HG: CPT | Performed by: INTERNAL MEDICINE

## 2024-01-17 PROCEDURE — 99214 OFFICE O/P EST MOD 30 MIN: CPT | Performed by: INTERNAL MEDICINE

## 2024-01-17 PROCEDURE — 93000 ELECTROCARDIOGRAM COMPLETE: CPT | Performed by: INTERNAL MEDICINE

## 2024-01-17 PROCEDURE — 1123F ACP DISCUSS/DSCN MKR DOCD: CPT | Performed by: INTERNAL MEDICINE

## 2024-01-17 PROCEDURE — 3075F SYST BP GE 130 - 139MM HG: CPT | Performed by: INTERNAL MEDICINE

## 2024-01-17 ASSESSMENT — PATIENT HEALTH QUESTIONNAIRE - PHQ9
SUM OF ALL RESPONSES TO PHQ QUESTIONS 1-9: 0
1. LITTLE INTEREST OR PLEASURE IN DOING THINGS: 0
SUM OF ALL RESPONSES TO PHQ QUESTIONS 1-9: 0
SUM OF ALL RESPONSES TO PHQ9 QUESTIONS 1 & 2: 0
SUM OF ALL RESPONSES TO PHQ QUESTIONS 1-9: 0
SUM OF ALL RESPONSES TO PHQ QUESTIONS 1-9: 0
2. FEELING DOWN, DEPRESSED OR HOPELESS: 0

## 2024-01-17 NOTE — PROGRESS NOTES
HISTORY OF PRESENT ILLNESS  Lucio Vance Jr.  78 y.o. male     Chief Complaint   Patient presents with    Follow-up     6 months       ASSESSMENT and PLAN    The primary encounter diagnosis was Chest pain, unspecified type. Diagnoses of Palpitations, Essential hypertension, Shortness of breath, Mixed hyperlipidemia, Acute cystitis without hematuria, and Constipation, unspecified constipation type were also pertinent to this visit.    Mr. Lucio Vance has no documented history of CAD.  However, he does have documented history of ICA disease.  Around October 2021, he presented with left hand numbness to [facility].  He was then noted to have occluded left ICA and severely diseased right ICA.  He had right CEA performed during that hospitalization.  He also has a longstanding history.  He denies previous history of tobacco use.  He has history of IDDM.  He has history of dyslipidemia on Lipitor.  He did have nuclear scan performed in March 2022 as well as an echocardiogram.  The nuclear scan showed EF greater than 70%, without significant reversible perfusion defect.  It was felt to be low risk.  His echocardiogram revealed EF 55-60%.  No significant regional wall motion normality was noted.  In October 2023, his echocardiogram showed normal EF 66% with PA pressure 29 mmHg.  Because of palpitations, his event monitor showed sinus rhythm with occasional PAC, PVC without significant arrhythmia.  His symptoms corresponded to PAC and sinus rhythm.    CAD:    He has no documented history of CAD.    PVD: status post right CEA.  BP:    Well-controlled at 132/74.  Rhythm:    Stable sinus rhythm at 77 bpm.  CHF:    There is no evidence of decompensated CHF noted.  Weight:     His weight today is 189 pounds.  His baseline weight is 195 pounds.  Cholesterol:   Target LDL <70.  Lipitor 40.  Anti-platelet:   Remains on ASA, and Plavix.     With multiple coronary risk factors and new nonspecific lateral ST/T wave changes on his ECG,

## 2024-01-17 NOTE — PROGRESS NOTES
Lucio Vance Jr. presents today for   Chief Complaint   Patient presents with    Follow-up     6 months       Lucio Vance Jr. preferred language for health care discussion is english/other.    Is someone accompanying this pt? no    Is the patient using any DME equipment during OV? no    Depression Screening:  Depression: Not at risk (1/17/2024)    PHQ-2     PHQ-2 Score: 0        Learning Assessment:  Who is the primary learner? Patient    What is the preferred language for health care of the primary learner? ENGLISH    How does the primary learner prefer to learn new concepts? DEMONSTRATION    Answered By patient    Relationship to Learner SELF           Pt currently taking Anticoagulant therapy? no    Pt currently taking Antiplatelet therapy ? aspirin      Coordination of Care:  1. Have you been to the ER, urgent care clinic since your last visit? Hospitalized since your last visit? no    2. Have you seen or consulted any other health care providers outside of the [facility] since your last visit? Include any pap smears or colon screening. no

## 2024-01-23 ENCOUNTER — ANESTHESIA EVENT (OUTPATIENT)
Facility: HOSPITAL | Age: 79
End: 2024-01-23
Payer: COMMERCIAL

## 2024-01-24 ENCOUNTER — ANESTHESIA (OUTPATIENT)
Facility: HOSPITAL | Age: 79
End: 2024-01-24
Payer: COMMERCIAL

## 2024-01-24 ENCOUNTER — ANESTHESIA EVENT (OUTPATIENT)
Facility: HOSPITAL | Age: 79
End: 2024-01-24
Payer: COMMERCIAL

## 2024-01-24 ENCOUNTER — HOSPITAL ENCOUNTER (OUTPATIENT)
Facility: HOSPITAL | Age: 79
Setting detail: OUTPATIENT SURGERY
Discharge: HOME | End: 2024-01-24
Attending: STUDENT IN AN ORGANIZED HEALTH CARE EDUCATION/TRAINING PROGRAM | Admitting: STUDENT IN AN ORGANIZED HEALTH CARE EDUCATION/TRAINING PROGRAM
Payer: COMMERCIAL

## 2024-01-24 VITALS
WEIGHT: 190 LBS | HEIGHT: 66 IN | DIASTOLIC BLOOD PRESSURE: 64 MMHG | RESPIRATION RATE: 14 BRPM | HEART RATE: 69 BPM | BODY MASS INDEX: 30.53 KG/M2 | OXYGEN SATURATION: 97 % | TEMPERATURE: 98.1 F | SYSTOLIC BLOOD PRESSURE: 125 MMHG

## 2024-01-24 LAB
GLUCOSE BLD STRIP.AUTO-MCNC: 134 MG/DL (ref 70–110)
GLUCOSE BLD STRIP.AUTO-MCNC: 145 MG/DL (ref 70–110)

## 2024-01-24 PROCEDURE — 6360000002 HC RX W HCPCS: Performed by: ANESTHESIOLOGY

## 2024-01-24 PROCEDURE — 7100000010 HC PHASE II RECOVERY - FIRST 15 MIN: Performed by: STUDENT IN AN ORGANIZED HEALTH CARE EDUCATION/TRAINING PROGRAM

## 2024-01-24 PROCEDURE — 7100000000 HC PACU RECOVERY - FIRST 15 MIN: Performed by: STUDENT IN AN ORGANIZED HEALTH CARE EDUCATION/TRAINING PROGRAM

## 2024-01-24 PROCEDURE — 82962 GLUCOSE BLOOD TEST: CPT

## 2024-01-24 PROCEDURE — 3600007502: Performed by: STUDENT IN AN ORGANIZED HEALTH CARE EDUCATION/TRAINING PROGRAM

## 2024-01-24 PROCEDURE — 2500000003 HC RX 250 WO HCPCS: Performed by: ANESTHESIOLOGY

## 2024-01-24 PROCEDURE — 3700000000 HC ANESTHESIA ATTENDED CARE: Performed by: STUDENT IN AN ORGANIZED HEALTH CARE EDUCATION/TRAINING PROGRAM

## 2024-01-24 PROCEDURE — 2709999900 HC NON-CHARGEABLE SUPPLY: Performed by: STUDENT IN AN ORGANIZED HEALTH CARE EDUCATION/TRAINING PROGRAM

## 2024-01-24 PROCEDURE — 2580000003 HC RX 258: Performed by: NURSE ANESTHETIST, CERTIFIED REGISTERED

## 2024-01-24 RX ORDER — PROPOFOL 10 MG/ML
INJECTION, EMULSION INTRAVENOUS PRN
Status: DISCONTINUED | OUTPATIENT
Start: 2024-01-24 | End: 2024-01-24 | Stop reason: SDUPTHER

## 2024-01-24 RX ORDER — SODIUM CHLORIDE, SODIUM LACTATE, POTASSIUM CHLORIDE, CALCIUM CHLORIDE 600; 310; 30; 20 MG/100ML; MG/100ML; MG/100ML; MG/100ML
INJECTION, SOLUTION INTRAVENOUS CONTINUOUS
Status: DISCONTINUED | OUTPATIENT
Start: 2024-01-24 | End: 2024-01-24 | Stop reason: HOSPADM

## 2024-01-24 RX ORDER — LIDOCAINE HYDROCHLORIDE 10 MG/ML
1 INJECTION, SOLUTION EPIDURAL; INFILTRATION; INTRACAUDAL; PERINEURAL
Status: DISCONTINUED | OUTPATIENT
Start: 2024-01-24 | End: 2024-01-24 | Stop reason: HOSPADM

## 2024-01-24 RX ORDER — LIDOCAINE HYDROCHLORIDE 20 MG/ML
INJECTION, SOLUTION EPIDURAL; INFILTRATION; INTRACAUDAL; PERINEURAL PRN
Status: DISCONTINUED | OUTPATIENT
Start: 2024-01-24 | End: 2024-01-24 | Stop reason: SDUPTHER

## 2024-01-24 RX ADMIN — SODIUM CHLORIDE, SODIUM LACTATE, POTASSIUM CHLORIDE, AND CALCIUM CHLORIDE: 600; 310; 30; 20 INJECTION, SOLUTION INTRAVENOUS at 10:40

## 2024-01-24 RX ADMIN — LIDOCAINE HYDROCHLORIDE 50 MG: 20 INJECTION, SOLUTION EPIDURAL; INFILTRATION; INTRACAUDAL; PERINEURAL at 10:44

## 2024-01-24 RX ADMIN — PROPOFOL 75 MG: 10 INJECTION, EMULSION INTRAVENOUS at 10:44

## 2024-01-24 NOTE — ANESTHESIA POSTPROCEDURE EVALUATION
Department of Anesthesiology  Postprocedure Note    Patient: Lucio Vance Jr.  MRN: [id number]  YOB: 1945  Date of evaluation: 1/24/2024    Procedure Summary       Date: 01/24/24 Room / Location: [facility] ENDO 02 / [facility] ENDOSCOPY    Anesthesia Start: 1040 Anesthesia Stop: 1054    Procedure: COLONOSCOPY (Abdomen) Diagnosis:       Personal history of colonic polyps      Iron deficiency anemia, unspecified iron deficiency anemia type      Obesity (BMI 30-39.9)      (Personal history of colonic polyps [Z86.010])      (Iron deficiency anemia, unspecified iron deficiency anemia type [D50.9])      (Obesity (BMI 30-39.9) [E66.9])    Surgeons: Jignesh Dowling MD Responsible Provider: Sidney Alvarez DO    Anesthesia Type: MAC ASA Status: 3            Anesthesia Type: MAC    Sohan Phase I: Sohan Score: 10    Sohan Phase II: Sohan Score: 10    Anesthesia Post Evaluation    Patient location during evaluation: PACU  Patient participation: complete - patient participated  Level of consciousness: awake and alert  Airway patency: patent  Nausea & Vomiting: no nausea and no vomiting  Cardiovascular status: hemodynamically stable  Respiratory status: acceptable  Hydration status: stable  Multimodal analgesia pain management approach    No notable events documented.

## 2024-01-24 NOTE — H&P
Paying Living Expenses: Not hard at all   Transportation Needs: Unknown (2/27/2023)    PRAPARE - Transportation     Lack of Transportation (Non-Medical): No   Housing Stability: Unknown (2/27/2023)    Housing Stability Vital Sign     Unstable Housing in the Last Year: No     Surgical H:   Past Surgical History:   Procedure Laterality Date    COLONOSCOPY N/A 1/5/2023    COLONOSCOPY WITH POLYPECTOMIES performed by Jignesh Dowling MD at [facility] ENDOSCOPY    LASER VAPORIZATION SURGERY PROSTATE, COMPLETE      THROMBOENDARTERECTOMY Right 10/20/2021    THROMBOENDARTERECTOMY, CAROTID;  Surgeon: Mikie Quevedo MD    UROLOGICAL SURGERY      Prostate Biopsy       ROS: negative    Physical Exam: BP (!) 167/83   Pulse 74   Resp 17   Ht 1.676 m (5' 6\")   Wt 86.2 kg (190 lb)   SpO2 97%   BMI 30.67 kg/m²   General appearance: alert, no distress  Eyes: pupils equal and reactive, extraocular eye movements intact  Nodes: No gross adenopathy in neck.  Skin: no spider angiomata, jaundice, palmar erythema   Respiratory: clear to auscultation bilaterally  Cardiovascular: regular heart rate, no murmurs, no JVD, normal rate and regular rhythm  Abdomen: soft, non-tender, liver not enlarged, spleen not palpable, no obvious ascites  Extremities: no muscle wasting, no gross arthritic changes  Neurologic: alert and oriented, cranial nerves grossly intact, no asterixis    Labs:   Recent Results (from the past 24 hour(s))   POCT Glucose    Collection Time: 01/24/24  8:51 AM   Result Value Ref Range    POC Glucose 145 (H) 70 - 110 mg/dL       Imp/ Plan: Will proceed with Colonoscopy as planned. Risk benefits alternative including but not limited to infection, bleeding, perforation of viscous, allergic reaction and resultant morbidity and mortality was discussed. Chance of missing a significant lesion due to various reasons were discussed.    Jignesh Dowling MD   [facility]

## 2024-01-24 NOTE — ANESTHESIA PRE PROCEDURE
• ICAO (internal carotid artery occlusion), left    • TIA (transient ischemic attack) 10/14/2021   • Transient ischemic attack (TIA) 04/2015   • Unspecified hyperplasia of prostate with urinary obstruction and other lower urinary tract symptoms (LUTS)        Past Surgical History:        Procedure Laterality Date   • COLONOSCOPY N/A 1/5/2023    COLONOSCOPY WITH POLYPECTOMIES performed by Jignesh Dowling MD at [facility] ENDOSCOPY   • LASER VAPORIZATION SURGERY PROSTATE, COMPLETE     • THROMBOENDARTERECTOMY Right 10/20/2021    THROMBOENDARTERECTOMY, CAROTID;  Surgeon: Mikie Quevedo MD   • UROLOGICAL SURGERY      Prostate Biopsy       Social History:    Social History     Tobacco Use   • Smoking status: Never     Passive exposure: Never   • Smokeless tobacco: Never   Substance Use Topics   • Alcohol use: No                                Counseling given: Not Answered      Vital Signs (Current):   Vitals:    01/02/24 1042 01/24/24 0845   BP:  (!) 167/83   Pulse:  74   Resp:  17   SpO2:  97%   Weight: 86.2 kg (190 lb)    Height: 1.676 m (5' 6\")                                               BP Readings from Last 3 Encounters:   01/24/24 (!) 167/83   01/17/24 132/74   12/01/23 135/68       NPO Status: Time of last liquid consumption: 0300                        Time of last solid consumption: 1800                        Date of last liquid consumption: 01/24/24                        Date of last solid food consumption: 01/22/24    BMI:   Wt Readings from Last 3 Encounters:   01/02/24 86.2 kg (190 lb)   01/17/24 85.7 kg (189 lb)   12/01/23 83.5 kg (184 lb)     Body mass index is 30.67 kg/m².    CBC:   Lab Results   Component Value Date/Time    WBC 4.3 08/23/2022 08:51 AM    RBC 3.58 08/23/2022 08:51 AM    HGB 8.4 08/23/2022 08:51 AM    HCT 28.2 08/23/2022 08:51 AM    MCV 79 08/23/2022 08:51 AM    RDW 19.7 08/23/2022 08:51 AM     08/23/2022 08:51 AM       CMP:   Lab Results   Component Value Date/Time

## 2024-01-25 ENCOUNTER — ANESTHESIA (OUTPATIENT)
Facility: HOSPITAL | Age: 79
End: 2024-01-25
Payer: COMMERCIAL

## 2024-01-25 ENCOUNTER — HOSPITAL ENCOUNTER (OUTPATIENT)
Facility: HOSPITAL | Age: 79
Setting detail: OUTPATIENT SURGERY
Discharge: HOME | End: 2024-01-25
Attending: STUDENT IN AN ORGANIZED HEALTH CARE EDUCATION/TRAINING PROGRAM | Admitting: STUDENT IN AN ORGANIZED HEALTH CARE EDUCATION/TRAINING PROGRAM
Payer: COMMERCIAL

## 2024-01-25 VITALS
SYSTOLIC BLOOD PRESSURE: 118 MMHG | RESPIRATION RATE: 16 BRPM | DIASTOLIC BLOOD PRESSURE: 55 MMHG | TEMPERATURE: 97.7 F | HEART RATE: 69 BPM | OXYGEN SATURATION: 99 %

## 2024-01-25 LAB
COLONOSCOPY, EXTERNAL: NORMAL
GLUCOSE BLD STRIP.AUTO-MCNC: 168 MG/DL (ref 70–110)
GLUCOSE BLD STRIP.AUTO-MCNC: 184 MG/DL (ref 70–110)

## 2024-01-25 PROCEDURE — 3600007502: Performed by: STUDENT IN AN ORGANIZED HEALTH CARE EDUCATION/TRAINING PROGRAM

## 2024-01-25 PROCEDURE — 88305 TISSUE EXAM BY PATHOLOGIST: CPT

## 2024-01-25 PROCEDURE — 7100000000 HC PACU RECOVERY - FIRST 15 MIN: Performed by: STUDENT IN AN ORGANIZED HEALTH CARE EDUCATION/TRAINING PROGRAM

## 2024-01-25 PROCEDURE — 2500000003 HC RX 250 WO HCPCS: Performed by: NURSE ANESTHETIST, CERTIFIED REGISTERED

## 2024-01-25 PROCEDURE — 3700000000 HC ANESTHESIA ATTENDED CARE: Performed by: STUDENT IN AN ORGANIZED HEALTH CARE EDUCATION/TRAINING PROGRAM

## 2024-01-25 PROCEDURE — 3700000001 HC ADD 15 MINUTES (ANESTHESIA): Performed by: STUDENT IN AN ORGANIZED HEALTH CARE EDUCATION/TRAINING PROGRAM

## 2024-01-25 PROCEDURE — 2580000003 HC RX 258: Performed by: NURSE ANESTHETIST, CERTIFIED REGISTERED

## 2024-01-25 PROCEDURE — 2709999900 HC NON-CHARGEABLE SUPPLY: Performed by: STUDENT IN AN ORGANIZED HEALTH CARE EDUCATION/TRAINING PROGRAM

## 2024-01-25 PROCEDURE — 7100000010 HC PHASE II RECOVERY - FIRST 15 MIN: Performed by: STUDENT IN AN ORGANIZED HEALTH CARE EDUCATION/TRAINING PROGRAM

## 2024-01-25 PROCEDURE — 3600007512: Performed by: STUDENT IN AN ORGANIZED HEALTH CARE EDUCATION/TRAINING PROGRAM

## 2024-01-25 PROCEDURE — 82962 GLUCOSE BLOOD TEST: CPT

## 2024-01-25 PROCEDURE — 6360000002 HC RX W HCPCS: Performed by: NURSE ANESTHETIST, CERTIFIED REGISTERED

## 2024-01-25 RX ORDER — SODIUM CHLORIDE 0.9 % (FLUSH) 0.9 %
5-40 SYRINGE (ML) INJECTION EVERY 12 HOURS SCHEDULED
Status: DISCONTINUED | OUTPATIENT
Start: 2024-01-25 | End: 2024-01-25 | Stop reason: HOSPADM

## 2024-01-25 RX ORDER — LIDOCAINE HYDROCHLORIDE 10 MG/ML
1 INJECTION, SOLUTION EPIDURAL; INFILTRATION; INTRACAUDAL; PERINEURAL
Status: DISCONTINUED | OUTPATIENT
Start: 2024-01-25 | End: 2024-01-25 | Stop reason: HOSPADM

## 2024-01-25 RX ORDER — SODIUM CHLORIDE 9 MG/ML
INJECTION, SOLUTION INTRAVENOUS PRN
Status: DISCONTINUED | OUTPATIENT
Start: 2024-01-25 | End: 2024-01-25 | Stop reason: HOSPADM

## 2024-01-25 RX ORDER — DEXTROSE MONOHYDRATE 100 MG/ML
INJECTION, SOLUTION INTRAVENOUS CONTINUOUS PRN
Status: DISCONTINUED | OUTPATIENT
Start: 2024-01-25 | End: 2024-01-25 | Stop reason: HOSPADM

## 2024-01-25 RX ORDER — INSULIN LISPRO 100 [IU]/ML
0-4 INJECTION, SOLUTION INTRAVENOUS; SUBCUTANEOUS ONCE
Status: DISCONTINUED | OUTPATIENT
Start: 2024-01-25 | End: 2024-01-25 | Stop reason: HOSPADM

## 2024-01-25 RX ORDER — PROPOFOL 10 MG/ML
INJECTION, EMULSION INTRAVENOUS PRN
Status: DISCONTINUED | OUTPATIENT
Start: 2024-01-25 | End: 2024-01-25 | Stop reason: SDUPTHER

## 2024-01-25 RX ORDER — LIDOCAINE HYDROCHLORIDE 20 MG/ML
INJECTION, SOLUTION EPIDURAL; INFILTRATION; INTRACAUDAL; PERINEURAL PRN
Status: DISCONTINUED | OUTPATIENT
Start: 2024-01-25 | End: 2024-01-25 | Stop reason: SDUPTHER

## 2024-01-25 RX ORDER — SODIUM CHLORIDE 0.9 % (FLUSH) 0.9 %
5-40 SYRINGE (ML) INJECTION PRN
Status: DISCONTINUED | OUTPATIENT
Start: 2024-01-25 | End: 2024-01-25 | Stop reason: HOSPADM

## 2024-01-25 RX ORDER — SODIUM CHLORIDE, SODIUM LACTATE, POTASSIUM CHLORIDE, CALCIUM CHLORIDE 600; 310; 30; 20 MG/100ML; MG/100ML; MG/100ML; MG/100ML
INJECTION, SOLUTION INTRAVENOUS CONTINUOUS PRN
Status: DISCONTINUED | OUTPATIENT
Start: 2024-01-25 | End: 2024-01-25 | Stop reason: SDUPTHER

## 2024-01-25 RX ADMIN — PROPOFOL 50 MG: 10 INJECTION, EMULSION INTRAVENOUS at 09:37

## 2024-01-25 RX ADMIN — LIDOCAINE HYDROCHLORIDE 30 MG: 20 INJECTION, SOLUTION EPIDURAL; INFILTRATION; INTRACAUDAL; PERINEURAL at 09:32

## 2024-01-25 RX ADMIN — PROPOFOL 150 MG: 10 INJECTION, EMULSION INTRAVENOUS at 09:32

## 2024-01-25 RX ADMIN — SODIUM CHLORIDE, SODIUM LACTATE, POTASSIUM CHLORIDE, AND CALCIUM CHLORIDE: 600; 310; 30; 20 INJECTION, SOLUTION INTRAVENOUS at 09:29

## 2024-01-25 ASSESSMENT — PAIN - FUNCTIONAL ASSESSMENT: PAIN_FUNCTIONAL_ASSESSMENT: 0-10

## 2024-01-25 NOTE — ANESTHESIA POSTPROCEDURE EVALUATION
Department of Anesthesiology  Postprocedure Note    Patient: Lucio Vance Jr.  MRN: [id number]  YOB: 1945  Date of evaluation: 1/25/2024    Procedure Summary       Date: 01/25/24 Room / Location: [facility] ENDO 02 / [facility] ENDOSCOPY    Anesthesia Start: 0929 Anesthesia Stop: 0947    Procedure: COLONOSCOPY with polypectomies (Abdomen) Diagnosis:       Personal history of colonic polyps      Iron deficiency anemia, unspecified iron deficiency anemia type      Obesity (BMI 30-39.9)      (Personal history of colonic polyps [Z86.010])      (Iron deficiency anemia, unspecified iron deficiency anemia type [D50.9])      (Obesity (BMI 30-39.9) [E66.9])    Surgeons: Jignesh Dowling MD Responsible Provider: Sidney Alvarez DO    Anesthesia Type: MAC ASA Status: 3            Anesthesia Type: MAC    Sohan Phase I: Sohan Score: 10    Sohan Phase II: Sohan Score: 10    Anesthesia Post Evaluation    Patient location during evaluation: PACU  Patient participation: complete - patient participated  Level of consciousness: awake and alert  Airway patency: patent  Nausea & Vomiting: no nausea and no vomiting  Cardiovascular status: hemodynamically stable  Respiratory status: acceptable  Hydration status: stable  Multimodal analgesia pain management approach    No notable events documented.

## 2024-01-25 NOTE — ANESTHESIA PRE PROCEDURE
Department of Anesthesiology  Preprocedure Note       Name:  Lucio Vance Jr.   Age:  78 y.o.  :  1945                                          MRN:  [id number]         Date:  2024      Surgeon: Surgeon(s):  Jignesh Dowling MD    Procedure: Procedure(s):  COLONOSCOPY    Medications prior to admission:   Prior to Admission medications    Medication Sig Start Date End Date Taking? Authorizing Provider   empagliflozin (JARDIANCE) 10 MG tablet Take 1 tablet by mouth daily Last taken Monday    Coreen Khan MD   carvedilol (COREG) 25 MG tablet Take 1 tablet by mouth 2 times daily 24   Bashir Clemens MD   fluticasone (FLONASE) 50 MCG/ACT nasal spray 1 spray daily 10/13/21   Coreen Khan MD   LUTEIN PO Take 1 tablet by mouth daily 22   Coreen Khan MD   Insulin Degludec (TRESIBA FLEXTOUCH) 200 UNIT/ML SOPN Inject 48 units subcutaneously every morning 23   Bashir Clmeens MD   polyethylene glycol (GLYCOLAX) 17 GM/SCOOP powder Take 17 g by mouth daily as needed (constipation) 23  Bashir Clemens MD   benzonatate (TESSALON) 100 MG capsule Take 1 capsule by mouth 2 times daily as needed for Cough 23   Bashir Clemens MD   albuterol sulfate HFA (PROVENTIL;VENTOLIN;PROAIR) 108 (90 Base) MCG/ACT inhaler Inhale 2 puffs into the lungs every 4 hours as needed for Wheezing 10/25/23   Bashir Clemens MD   Semaglutide,0.25 or 0.5MG/DOS, (OZEMPIC, 0.25 OR 0.5 MG/DOSE,) 2 MG/3ML SOPN Inject 0.5 mg into the skin every 7 days 10/23/23   Bashir Clemens MD   Continuous Blood Gluc Sensor (FREESTYLE CARLOS 2 SENSOR) MISC Use to monitor blood glucose continuously - change every 14 days 10/11/23   Bashir Clemens MD   Insulin Pen Needle 32G X 4 MM MISC 1 each by Does not apply route daily 10/11/23   Bashir Clemens MD   meclizine (ANTIVERT) 25 MG tablet Take 1 tablet by mouth 3 times daily as needed    Provider, MD Coreen   Multiple Vitamins-Minerals (MULTIVITAMINS/MINERALS

## 2024-01-25 NOTE — DISCHARGE INSTRUCTIONS
Colonoscopy: What to Expect at Home  Your Recovery  After a colonoscopy, you'll stay at the clinic until you wake up. Then you can go home. But you'll need to arrange for a ride. Your doctor will tell you when you can eat and do your other usual activities.  Your doctor will talk to you about when you'll need your next colonoscopy. Your doctor can help you decide how often you need to be checked. This will depend on the results of your test and your risk for colorectal cancer.  After the test, you may be bloated or have gas pains. You may need to pass gas. If a biopsy was done or a polyp was removed, you may have streaks of blood in your stool (feces) for a few days. Problems such as heavy rectal bleeding may not occur until several weeks after the test. This isn't common. But it can happen after polyps are removed.  This care sheet gives you a general idea about how long it will take for you to recover. But each person recovers at a different pace. Follow the steps below to get better as quickly as possible.  How can you care for yourself at home?  Activity    Rest when you feel tired.     You can do your normal activities when it feels okay to do so.   Diet    Follow your doctor's directions for eating.     Unless your doctor has told you not to, drink plenty of fluids. This helps to replace the fluids that were lost during the colon prep.     Do not drink alcohol.   Medicines    Your doctor will tell you if and when you can restart your medicines. You will also be given instructions about taking any new medicines.     If you take aspirin or some other blood thinner, ask your doctor if and when to start taking it again. Make sure that you understand exactly what your doctor wants you to do.     If polyps were removed or a biopsy was done during the test, your doctor may tell you not to take aspirin or other anti-inflammatory medicines for a few days. These include ibuprofen (Advil, Motrin) and naproxen (Aleve).

## 2024-02-06 NOTE — PROGRESS NOTES
Pharmacy Progress Note - Diabetes Management       Assessment / Plan:   Diabetes Management:  Per ADA guidelines, Pt's A1c is not at goal of < 7%.  Pt's glycemic control improved with continued use of Tresiba.  Since stopping the Tresiba, his basal values are on an upward trend.  Given that he refuses to restart the Tresiba at this time, will hold it for now.  Will reassess with CGM data in 2 weeks to determine if he will allow a restart of another basal insulin.     Nutrition/Lifestyle Modifications:  - Educated pt on the importance of moderating carbohydrate intake. Reviewed sources of carbohydrates and method to help determine appropriate portion sizes (e.g., Diabetes Plate Method).  - Advised patient to avoid sugar-sweetened beverages and replace with water or diet/zero sugar option.  - Recommend ~30 minutes consistent, moderately intensive, exercise/day or ~150 minutes/week. Start small, stay consistent, and increase length and types of exercise, as tolerated.       Patient will return to clinic in 2 week(s) for follow up.        S/O: Mr. Lucio Wheatbrie Chen., a 78 y.o. 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  Pt was seen today for diabetes management.  Patient's last A1c was:   Hemoglobin A1C   Date Value Ref Range Status   11/28/2023 8.9 (H) 4.8 - 5.6 % Final     Comment:                 Prediabetes: 5.7 - 6.4           Diabetes: >6.4           Glycemic control for adults with diabetes: <7.0       Hemoglobin A1C, POC   Date Value Ref Range Status   09/01/2023 8.3 % Final       Interim update: Pt was last seen by me on 1/10/2024.  Per my prior note: Pt's

## 2024-02-07 ENCOUNTER — PHARMACY VISIT (OUTPATIENT)
Age: 79
End: 2024-02-07

## 2024-02-07 DIAGNOSIS — N18.32 TYPE 2 DIABETES MELLITUS WITH STAGE 3B CHRONIC KIDNEY DISEASE, WITH LONG-TERM CURRENT USE OF INSULIN (HCC): Primary | ICD-10-CM

## 2024-02-07 DIAGNOSIS — Z79.4 TYPE 2 DIABETES MELLITUS WITH STAGE 3B CHRONIC KIDNEY DISEASE, WITH LONG-TERM CURRENT USE OF INSULIN (HCC): Primary | ICD-10-CM

## 2024-02-07 DIAGNOSIS — E11.22 TYPE 2 DIABETES MELLITUS WITH STAGE 3B CHRONIC KIDNEY DISEASE, WITH LONG-TERM CURRENT USE OF INSULIN (HCC): Primary | ICD-10-CM

## 2024-02-21 NOTE — PROGRESS NOTES
Pharmacy Progress Note - Diabetes Management       Assessment / Plan:   Diabetes Management:  Per ADA guidelines, Pt's A1c is not at goal of < 7%.  Pt's glycemic control is poor d/t his erratic adherence to his medications.  Since he is going to be stopping the Tresiba again, will switch him back to Lantus at 48 units qam.  He states that he never had an issue with this insulin.  Will maintain his other tx and reassess tolerability and glycemic control at follow up in 2 weeks.     Nutrition/Lifestyle Modifications:  - Educated pt on the importance of moderating carbohydrate intake. Reviewed sources of carbohydrates and method to help determine appropriate portion sizes (e.g., Diabetes Plate Method).  - Advised patient to avoid sugar-sweetened beverages and replace with water or diet/zero sugar option.  - Recommend ~30 minutes consistent, moderately intensive, exercise/day or ~150 minutes/week. Start small, stay consistent, and increase length and types of exercise, as tolerated.       Patient will return to clinic in 2 week(s) for follow up.        S/O: Mr. Lucio Wheatbrie Chen., a 78 y.o. 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  Pt was seen today for diabetes management.  Patient's last A1c was:   Hemoglobin A1C   Date Value Ref Range Status   11/28/2023 8.9 (H) 4.8 - 5.6 % Final     Comment:                 Prediabetes: 5.7 - 6.4           Diabetes: >6.4           Glycemic control for adults with diabetes: <7.0       Hemoglobin A1C, POC   Date Value Ref Range Status   09/01/2023 8.3 % Final       Interim update: Pt was last seen by me on 2/7/2024.  Per my

## 2024-02-23 ENCOUNTER — PHARMACY VISIT (OUTPATIENT)
Age: 79
End: 2024-02-23

## 2024-02-23 DIAGNOSIS — E11.22 TYPE 2 DIABETES MELLITUS WITH STAGE 3B CHRONIC KIDNEY DISEASE, WITH LONG-TERM CURRENT USE OF INSULIN (HCC): Primary | ICD-10-CM

## 2024-02-23 DIAGNOSIS — Z79.4 TYPE 2 DIABETES MELLITUS WITH STAGE 3B CHRONIC KIDNEY DISEASE, WITH LONG-TERM CURRENT USE OF INSULIN (HCC): Primary | ICD-10-CM

## 2024-02-23 DIAGNOSIS — N18.32 TYPE 2 DIABETES MELLITUS WITH STAGE 3B CHRONIC KIDNEY DISEASE, WITH LONG-TERM CURRENT USE OF INSULIN (HCC): Primary | ICD-10-CM

## 2024-02-23 RX ORDER — INSULIN GLARGINE 100 [IU]/ML
48 INJECTION, SOLUTION SUBCUTANEOUS NIGHTLY
Qty: 15 ML | Refills: 1 | Status: SHIPPED | OUTPATIENT
Start: 2024-02-23

## 2024-02-28 LAB
HBA1C MFR BLD: 8.3 % (ref 4.8–5.6)
SPECIMEN STATUS REPORT: NORMAL

## 2024-02-29 LAB
ALBUMIN SERPL-MCNC: 4.2 G/DL (ref 3.8–4.8)
ALBUMIN/GLOB SERPL: 1.7 {RATIO} (ref 1.2–2.2)
ALP SERPL-CCNC: 76 IU/L (ref 44–121)
ALT SERPL-CCNC: 15 IU/L (ref 0–44)
AST SERPL-CCNC: 18 IU/L (ref 0–40)
BILIRUB SERPL-MCNC: 0.3 MG/DL (ref 0–1.2)
BUN SERPL-MCNC: 22 MG/DL (ref 8–27)
BUN/CREAT SERPL: 13 (ref 10–24)
CALCIUM SERPL-MCNC: 9.3 MG/DL (ref 8.6–10.2)
CHLORIDE SERPL-SCNC: 102 MMOL/L (ref 96–106)
CHOLEST SERPL-MCNC: 147 MG/DL (ref 100–199)
CO2 SERPL-SCNC: 20 MMOL/L (ref 20–29)
CREAT SERPL-MCNC: 1.76 MG/DL (ref 0.76–1.27)
EGFRCR SERPLBLD CKD-EPI 2021: 39 ML/MIN/1.73
GLOBULIN SER CALC-MCNC: 2.5 G/DL (ref 1.5–4.5)
GLUCOSE SERPL-MCNC: 192 MG/DL (ref 70–99)
HDLC SERPL-MCNC: 47 MG/DL
LDLC SERPL CALC-MCNC: 74 MG/DL (ref 0–99)
POTASSIUM SERPL-SCNC: 4.5 MMOL/L (ref 3.5–5.2)
PROT SERPL-MCNC: 6.7 G/DL (ref 6–8.5)
SODIUM SERPL-SCNC: 136 MMOL/L (ref 134–144)
TRIGL SERPL-MCNC: 149 MG/DL (ref 0–149)
VLDLC SERPL CALC-MCNC: 26 MG/DL (ref 5–40)

## 2024-03-01 ENCOUNTER — OFFICE VISIT (OUTPATIENT)
Age: 79
End: 2024-03-01
Payer: COMMERCIAL

## 2024-03-01 VITALS
OXYGEN SATURATION: 98 % | SYSTOLIC BLOOD PRESSURE: 139 MMHG | HEIGHT: 66 IN | WEIGHT: 185 LBS | TEMPERATURE: 98.4 F | DIASTOLIC BLOOD PRESSURE: 73 MMHG | RESPIRATION RATE: 20 BRPM | BODY MASS INDEX: 29.73 KG/M2 | HEART RATE: 85 BPM

## 2024-03-01 DIAGNOSIS — Z79.4 TYPE 2 DIABETES MELLITUS WITH STAGE 3B CHRONIC KIDNEY DISEASE, WITH LONG-TERM CURRENT USE OF INSULIN (HCC): Primary | ICD-10-CM

## 2024-03-01 DIAGNOSIS — I10 ESSENTIAL (PRIMARY) HYPERTENSION: ICD-10-CM

## 2024-03-01 DIAGNOSIS — E78.2 MIXED HYPERLIPIDEMIA: ICD-10-CM

## 2024-03-01 DIAGNOSIS — E11.22 TYPE 2 DIABETES MELLITUS WITH STAGE 3B CHRONIC KIDNEY DISEASE, WITH LONG-TERM CURRENT USE OF INSULIN (HCC): Primary | ICD-10-CM

## 2024-03-01 DIAGNOSIS — N18.32 TYPE 2 DIABETES MELLITUS WITH STAGE 3B CHRONIC KIDNEY DISEASE, WITH LONG-TERM CURRENT USE OF INSULIN (HCC): Primary | ICD-10-CM

## 2024-03-01 LAB
ALBUMIN/CREAT UR: 218 MG/G CREAT (ref 0–29)
CREAT UR-MCNC: 62.6 MG/DL
MICROALBUMIN UR-MCNC: 136.5 UG/ML

## 2024-03-01 PROCEDURE — 3075F SYST BP GE 130 - 139MM HG: CPT | Performed by: INTERNAL MEDICINE

## 2024-03-01 PROCEDURE — 99214 OFFICE O/P EST MOD 30 MIN: CPT | Performed by: INTERNAL MEDICINE

## 2024-03-01 PROCEDURE — G8427 DOCREV CUR MEDS BY ELIG CLIN: HCPCS | Performed by: INTERNAL MEDICINE

## 2024-03-01 PROCEDURE — G8417 CALC BMI ABV UP PARAM F/U: HCPCS | Performed by: INTERNAL MEDICINE

## 2024-03-01 PROCEDURE — 1036F TOBACCO NON-USER: CPT | Performed by: INTERNAL MEDICINE

## 2024-03-01 PROCEDURE — 1123F ACP DISCUSS/DSCN MKR DOCD: CPT | Performed by: INTERNAL MEDICINE

## 2024-03-01 PROCEDURE — 3052F HG A1C>EQUAL 8.0%<EQUAL 9.0%: CPT | Performed by: INTERNAL MEDICINE

## 2024-03-01 PROCEDURE — G8484 FLU IMMUNIZE NO ADMIN: HCPCS | Performed by: INTERNAL MEDICINE

## 2024-03-01 PROCEDURE — 3078F DIAST BP <80 MM HG: CPT | Performed by: INTERNAL MEDICINE

## 2024-03-01 RX ORDER — HYDROCHLOROTHIAZIDE 25 MG/1
25 TABLET ORAL EVERY MORNING
Qty: 90 TABLET | Refills: 3 | Status: SHIPPED | OUTPATIENT
Start: 2024-03-01

## 2024-03-01 SDOH — ECONOMIC STABILITY: INCOME INSECURITY: HOW HARD IS IT FOR YOU TO PAY FOR THE VERY BASICS LIKE FOOD, HOUSING, MEDICAL CARE, AND HEATING?: NOT HARD AT ALL

## 2024-03-01 SDOH — ECONOMIC STABILITY: FOOD INSECURITY: WITHIN THE PAST 12 MONTHS, YOU WORRIED THAT YOUR FOOD WOULD RUN OUT BEFORE YOU GOT MONEY TO BUY MORE.: NEVER TRUE

## 2024-03-01 SDOH — ECONOMIC STABILITY: FOOD INSECURITY: WITHIN THE PAST 12 MONTHS, THE FOOD YOU BOUGHT JUST DIDN'T LAST AND YOU DIDN'T HAVE MONEY TO GET MORE.: NEVER TRUE

## 2024-03-01 NOTE — PROGRESS NOTES
Subjective:       Chief Complaint  The patient presents for follow up of diabetes, hypertension and high cholesterol.        AJNIE Vance Jr. is a 78 y.o. male seen for follow up of diabetes.   Healso has hypertension and hyperlipidemia. Diabetes status uncontrolled, pt is tolerating Lantus  insulin 42 units and not having any significantly low blood sugars is being followed by Pharm.D who started him on Ozempic and he is now on 0.5 mg/week.  He feels he gets palpitations with Ozempic.  He did not tolerate Trulicity.  Will defer to Pharm.D. for further management.  Patient has been very sensitive to medications in the past.  Hypertension  borderline controlled,  on lisinopril 40 mg and coreg 25 mg BID, and Norvasc 10 mg and HCTZ .  Patient encouraged to continue to monitor his blood pressure at home and be compliant with taking his medications.  Hyperlipidemia no significant medication side effects noted,  borderline controlled on  Lipitor 40 mg. . He has h/o CVA and right carotid endarterectomy so AIM is LDL<70    Diet and Lifestyle: generally follows a low fat low cholesterol diet, does not rigorously follow a diabetic diet, exercises sporadically    Home BP Monitoring: is controlled at home,    Diabetic Review of Systems - home glucose monitoring: is performed regularly, 3x/day.    Other symptoms and concerns: Patient has a history of asbestosis exposure.  Consider chest x-ray follow-up in the near future.      Discussed the patient's BMI with him.  The BMI follow up plan is as follows: I have counseled this patient on diet and exercise regimens.    [place]  It has been over 5 years.  Cologuard done June 2021 was negative.  At age 75 no further testing needed unless patient has symptoms.    Patient has a history of asthma and is doing fairly well on Advair.    Patient has a history of TIAs/CVA  in the past and has a completely occluded left carotid artery.  This

## 2024-03-01 NOTE — PROGRESS NOTES
Lucio Vance Jr. presents today for   Chief Complaint   Patient presents with    Cholesterol Problem    Hypertension    Diabetes     3 month follow up            \"Have you been to the ER, urgent care clinic since your last visit?  Hospitalized since your last visit?\"    NO    “Have you seen or consulted any other health care providers outside of [facility] since your last visit?”    NO

## 2024-03-06 ENCOUNTER — PHARMACY VISIT (OUTPATIENT)
Age: 79
End: 2024-03-06

## 2024-03-06 DIAGNOSIS — Z79.4 TYPE 2 DIABETES MELLITUS WITH STAGE 3B CHRONIC KIDNEY DISEASE, WITH LONG-TERM CURRENT USE OF INSULIN (HCC): Primary | ICD-10-CM

## 2024-03-06 DIAGNOSIS — N18.32 TYPE 2 DIABETES MELLITUS WITH STAGE 3B CHRONIC KIDNEY DISEASE, WITH LONG-TERM CURRENT USE OF INSULIN (HCC): Primary | ICD-10-CM

## 2024-03-06 DIAGNOSIS — E11.22 TYPE 2 DIABETES MELLITUS WITH STAGE 3B CHRONIC KIDNEY DISEASE, WITH LONG-TERM CURRENT USE OF INSULIN (HCC): Primary | ICD-10-CM

## 2024-03-06 NOTE — PROGRESS NOTES
Pharmacy Progress Note - Diabetes Management       Assessment / Plan:   Diabetes Management:  Per ADA guidelines, Pt's A1c is not at goal of < 7%.  Pt's adherence has increased per his report for his insulin and Ozempic which appears to be leading to improved glycemic control per his report.  However, without CGM data, unable to fully assess.  Will maintain his current tx and will reassess with CGM data in 4 weeks.     Nutrition/Lifestyle Modifications:  - Educated pt on the importance of moderating carbohydrate intake. Reviewed sources of carbohydrates and method to help determine appropriate portion sizes (e.g., Diabetes Plate Method).  - Advised patient to avoid sugar-sweetened beverages and replace with water or diet/zero sugar option.  - Recommend ~30 minutes consistent, moderately intensive, exercise/day or ~150 minutes/week. Start small, stay consistent, and increase length and types of exercise, as tolerated.       Patient will return to clinic in 4 week(s) for follow up.        S/O: Mr. Lucio Vance Jr., a 78 y.o. 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  Pt was seen today for diabetes management.  Patient's last A1c was:   Hemoglobin A1C   Date Value Ref Range Status   02/28/2024 8.3 (H) 4.8 - 5.6 % Final     Comment:                 Prediabetes: 5.7 - 6.4           Diabetes: >6.4           Glycemic control for adults with diabetes: <7.0       Hemoglobin A1C, POC   Date Value Ref Range Status   09/01/2023 8.3 % Final       Interim update: Pt was last seen by me on 2/23/2024.  Per my prior note: Pt's A1c is not at goal of < 7%.  Pt's glycemic

## 2024-04-19 ENCOUNTER — TELEPHONE (OUTPATIENT)
Age: 79
End: 2024-04-19

## 2024-04-19 DIAGNOSIS — I12.9 TYPE 2 DM WITH CKD STAGE 3 AND HYPERTENSION (HCC): Primary | ICD-10-CM

## 2024-04-19 DIAGNOSIS — N18.30 TYPE 2 DM WITH CKD STAGE 3 AND HYPERTENSION (HCC): Primary | ICD-10-CM

## 2024-04-19 DIAGNOSIS — E11.22 TYPE 2 DM WITH CKD STAGE 3 AND HYPERTENSION (HCC): Primary | ICD-10-CM

## 2024-04-19 NOTE — TELEPHONE ENCOUNTER
Patient called in and states he needs a freestyle frederic 2 reader. His went out this morning.     Pharmacy [id number], [place] [address] - P [phone] - F [phone] [[id number]]

## 2024-04-21 DIAGNOSIS — E11.22 TYPE 2 DIABETES MELLITUS WITH STAGE 3B CHRONIC KIDNEY DISEASE, WITH LONG-TERM CURRENT USE OF INSULIN (HCC): ICD-10-CM

## 2024-04-21 DIAGNOSIS — Z79.4 TYPE 2 DIABETES MELLITUS WITH STAGE 3B CHRONIC KIDNEY DISEASE, WITH LONG-TERM CURRENT USE OF INSULIN (HCC): ICD-10-CM

## 2024-04-21 DIAGNOSIS — N18.32 TYPE 2 DIABETES MELLITUS WITH STAGE 3B CHRONIC KIDNEY DISEASE, WITH LONG-TERM CURRENT USE OF INSULIN (HCC): ICD-10-CM

## 2024-04-21 RX ORDER — INSULIN GLARGINE 100 [IU]/ML
INJECTION, SOLUTION SUBCUTANEOUS
Qty: 15 ML | Refills: 5 | Status: SHIPPED | OUTPATIENT
Start: 2024-04-21

## 2024-04-22 NOTE — PROGRESS NOTES
Date Value Ref Range Status   02/28/2024 8.3 (H) 4.8 - 5.6 % Final     Comment:                 Prediabetes: 5.7 - 6.4           Diabetes: >6.4           Glycemic control for adults with diabetes: <7.0     11/28/2023 8.9 (H) 4.8 - 5.6 % Final     Comment:                 Prediabetes: 5.7 - 6.4           Diabetes: >6.4           Glycemic control for adults with diabetes: <7.0     08/15/2023 8.7 (H) 4.8 - 5.6 % Final     Comment:              Prediabetes: 5.7 - 6.4           Diabetes: >6.4           Glycemic control for adults with diabetes: <7.0       Hemoglobin A1C, POC   Date Value Ref Range Status   09/01/2023 8.3 % Final       Screenings/Prevention Parameters:  -Diabetic Eye and Foot Exams:      Diabetes Management   Topic Date Due    Diabetic foot exam  Never done    Diabetic retinal exam  03/07/2024     -Microalbumin / Creatinine ratio:       Lab Results   Component Value Date/Time    MICROALBUR 185.1 04/18/2022 08:56 AM    LABCREA 62.6 02/28/2024 10:26 AM        Lab Results   Component Value Date    MALBCR 218 (H) 02/28/2024    MALBCR 422 (H) 05/22/2023    MALBCR 302 (H) 04/18/2022      No results found for: \"MALBCREARAT\"  -Immunizations:      Immunization History   Administered Date(s) Administered    COVID-19, MODERNA BLUE border, Primary or Immunocompromised, (age 12y+), IM, 100 mcg/0.5mL 03/11/2021, 04/08/2021, 12/23/2021, 04/23/2022    Influenza Trivalent 10/29/2010, 11/28/2011, 11/29/2011, 10/09/2012, 10/18/2013, 10/16/2014, 10/22/2021    Pneumococcal, PCV-13, PREVNAR 13, (age 6w+), IM, 0.5mL 07/28/2015, 07/28/2015    Pneumococcal, PPSV23, PNEUMOVAX 23, (age 2y+), SC/IM, 0.5mL 11/13/2008, 10/29/2010, 01/22/2021    Zoster Live (Zostavax) 12/18/2019    Zoster Recombinant (Shingrix) 12/18/2019       Additional Laboratory Parameters of Interest:   Estimation of renal function:  Lab Results   Component Value Date/Time    LABGLOM 39 02/28/2024 10:26 AM    LABGLOM 36 11/28/2023 10:12 AM    LABGLOM 34

## 2024-04-24 ENCOUNTER — TELEPHONE (OUTPATIENT)
Age: 79
End: 2024-04-24

## 2024-04-24 ENCOUNTER — PHARMACY VISIT (OUTPATIENT)
Age: 79
End: 2024-04-24

## 2024-04-24 DIAGNOSIS — E11.22 TYPE 2 DIABETES MELLITUS WITH STAGE 3B CHRONIC KIDNEY DISEASE, WITH LONG-TERM CURRENT USE OF INSULIN (HCC): ICD-10-CM

## 2024-04-24 DIAGNOSIS — Z79.4 TYPE 2 DIABETES MELLITUS WITH STAGE 3B CHRONIC KIDNEY DISEASE, WITH LONG-TERM CURRENT USE OF INSULIN (HCC): ICD-10-CM

## 2024-04-24 DIAGNOSIS — N18.32 TYPE 2 DIABETES MELLITUS WITH STAGE 3B CHRONIC KIDNEY DISEASE, WITH LONG-TERM CURRENT USE OF INSULIN (HCC): ICD-10-CM

## 2024-04-24 RX ORDER — INSULIN GLARGINE 100 [IU]/ML
62 INJECTION, SOLUTION SUBCUTANEOUS EVERY MORNING
Qty: 30 ML | Refills: 11 | Status: SHIPPED | OUTPATIENT
Start: 2024-04-24

## 2024-04-24 NOTE — TELEPHONE ENCOUNTER
Spoke with [facility] pharmacy and they stated that they do not carry freestyle Pankaj 2 Sanostee , he will need to get that from a DME. Also the lantus solostar is out of stock not sure when it will be in .Please  Advise

## 2024-04-25 NOTE — TELEPHONE ENCOUNTER
Order for Continuous Glucose monitor will be sent to [facility].   Spoke with Ms. BECKY Vance and she is aware patient will have to call the local pharmacies where the is willing to travel to and will send prescription for Lantus.

## 2024-04-26 ENCOUNTER — TELEPHONE (OUTPATIENT)
Age: 79
End: 2024-04-26

## 2024-04-26 NOTE — TELEPHONE ENCOUNTER
Pt called in states he needs a new scanner/reader for the Continuous Blood Gluc  (FREESTYLE CARLOS 2 READER) JAYASHREE       Please advise     Pharmacy   [id number], [place] [address] - P [phone] - F [phone] [[id number]]

## 2024-04-29 ENCOUNTER — TELEPHONE (OUTPATIENT)
Age: 79
End: 2024-04-29

## 2024-04-29 DIAGNOSIS — Z79.4 TYPE 2 DIABETES MELLITUS WITH STAGE 3B CHRONIC KIDNEY DISEASE, WITH LONG-TERM CURRENT USE OF INSULIN (HCC): ICD-10-CM

## 2024-04-29 DIAGNOSIS — E11.22 TYPE 2 DIABETES MELLITUS WITH STAGE 3B CHRONIC KIDNEY DISEASE, WITH LONG-TERM CURRENT USE OF INSULIN (HCC): ICD-10-CM

## 2024-04-29 DIAGNOSIS — N18.32 TYPE 2 DIABETES MELLITUS WITH STAGE 3B CHRONIC KIDNEY DISEASE, WITH LONG-TERM CURRENT USE OF INSULIN (HCC): ICD-10-CM

## 2024-04-29 RX ORDER — LANSOPRAZOLE 30 MG/1
30 CAPSULE, DELAYED RELEASE ORAL
Qty: 90 CAPSULE | Refills: 3 | Status: SHIPPED | OUTPATIENT
Start: 2024-04-29

## 2024-04-29 NOTE — TELEPHONE ENCOUNTER
[facility] cannot fill prescription for     insulin glargine (LANTUS SOLOSTAR) 100 UNIT/ML injection pen [[id number]]     Pt would like a 3 month supply sent to a different pharmacy. Pt has verified quantity and coverage with his insurance.       Also, Refill request for     polyethylene glycol (GLYCOLAX) 17 GM/SCOOP powder     Please send both to [facility].

## 2024-04-30 DIAGNOSIS — Z79.4 TYPE 2 DIABETES MELLITUS WITH STAGE 3B CHRONIC KIDNEY DISEASE, WITH LONG-TERM CURRENT USE OF INSULIN (HCC): ICD-10-CM

## 2024-04-30 DIAGNOSIS — N18.32 TYPE 2 DIABETES MELLITUS WITH STAGE 3B CHRONIC KIDNEY DISEASE, WITH LONG-TERM CURRENT USE OF INSULIN (HCC): ICD-10-CM

## 2024-04-30 DIAGNOSIS — E11.22 TYPE 2 DIABETES MELLITUS WITH STAGE 3B CHRONIC KIDNEY DISEASE, WITH LONG-TERM CURRENT USE OF INSULIN (HCC): ICD-10-CM

## 2024-04-30 RX ORDER — INSULIN GLARGINE 100 [IU]/ML
INJECTION, SOLUTION SUBCUTANEOUS
Refills: 0 | OUTPATIENT
Start: 2024-04-30

## 2024-04-30 RX ORDER — INSULIN GLARGINE 100 [IU]/ML
62 INJECTION, SOLUTION SUBCUTANEOUS EVERY MORNING
Qty: 45 ML | Refills: 3 | Status: SHIPPED | OUTPATIENT
Start: 2024-04-30

## 2024-04-30 NOTE — TELEPHONE ENCOUNTER
Pt called stating that he want his insulin glargine (LANTUS SOLOSTAR) 100 UNIT/ML injection pen   Sent to     [facility] - [facility], [place] - [address] [place] [address] - P [phone] - F [phone] [[id number]]

## 2024-05-07 ENCOUNTER — TELEPHONE (OUTPATIENT)
Age: 79
End: 2024-05-07

## 2024-05-07 DIAGNOSIS — N18.32 TYPE 2 DIABETES MELLITUS WITH STAGE 3B CHRONIC KIDNEY DISEASE, WITH LONG-TERM CURRENT USE OF INSULIN (HCC): ICD-10-CM

## 2024-05-07 DIAGNOSIS — E11.22 TYPE 2 DIABETES MELLITUS WITH STAGE 3B CHRONIC KIDNEY DISEASE, WITH LONG-TERM CURRENT USE OF INSULIN (HCC): ICD-10-CM

## 2024-05-07 DIAGNOSIS — Z79.4 TYPE 2 DIABETES MELLITUS WITH STAGE 3B CHRONIC KIDNEY DISEASE, WITH LONG-TERM CURRENT USE OF INSULIN (HCC): ICD-10-CM

## 2024-05-07 RX ORDER — INSULIN GLARGINE 100 [IU]/ML
62 INJECTION, SOLUTION SUBCUTANEOUS EVERY MORNING
Qty: 45 ML | Refills: 3 | Status: SHIPPED | OUTPATIENT
Start: 2024-05-07

## 2024-05-07 NOTE — TELEPHONE ENCOUNTER
Catarino from [facility] called and requested Tae Zhang be renewed. I mentioned that it was sent on 4/30. ES verified they received it but it was cancelled and is requesting a new script be sent.     Please advise

## 2024-05-13 NOTE — PROGRESS NOTES
Appointment  Intervention Accepted By: Patient/Caregiver: 3  Gap Closed?: No   Time Spent (min): 30

## 2024-05-15 ENCOUNTER — PHARMACY VISIT (OUTPATIENT)
Age: 79
End: 2024-05-15

## 2024-05-15 ENCOUNTER — TELEPHONE (OUTPATIENT)
Age: 79
End: 2024-05-15

## 2024-05-15 DIAGNOSIS — N18.32 TYPE 2 DIABETES MELLITUS WITH STAGE 3B CHRONIC KIDNEY DISEASE, WITH LONG-TERM CURRENT USE OF INSULIN (HCC): ICD-10-CM

## 2024-05-15 DIAGNOSIS — E11.22 TYPE 2 DIABETES MELLITUS WITH STAGE 3B CHRONIC KIDNEY DISEASE, WITH LONG-TERM CURRENT USE OF INSULIN (HCC): ICD-10-CM

## 2024-05-15 DIAGNOSIS — Z79.4 TYPE 2 DIABETES MELLITUS WITH STAGE 3B CHRONIC KIDNEY DISEASE, WITH LONG-TERM CURRENT USE OF INSULIN (HCC): ICD-10-CM

## 2024-05-15 RX ORDER — INSULIN GLARGINE 100 [IU]/ML
60 INJECTION, SOLUTION SUBCUTANEOUS EVERY MORNING
Qty: 60 ML | Refills: 3 | Status: SHIPPED | OUTPATIENT
Start: 2024-05-15 | End: 2024-05-16

## 2024-05-15 NOTE — TELEPHONE ENCOUNTER
Pharmacy Progress Note - Telephone Call    [facility] was contacted via an outbound telephone call today to perform the coverage review.  After several representatives, it was determined that a PA would be needed.  Will fill out a PA on CMM for the Lantus.    Thank you,    Felipe Roman, PharmD, BCACP, BC-[facility]        For Pharmacy Admin Tracking Only    Program: Medical Group  CPA in place:  Yes  Recommendation Provided To: Pharmacy: 1  Intervention Detail: Benefit Assistance  Intervention Accepted By: Pharmacy: 1  Gap Closed?: Yes   Time Spent (min): 45

## 2024-05-15 NOTE — TELEPHONE ENCOUNTER
Ana Maria called in stating LANTUS SOLOSTAR 100 UNIT/ML injection pen   Is not covered and wants to know if we can do a coverage review.   Please advise     Phone# [phone]    She req a Pt call back if able.

## 2024-05-16 ENCOUNTER — TELEPHONE (OUTPATIENT)
Age: 79
End: 2024-05-16

## 2024-05-16 RX ORDER — INSULIN GLARGINE 100 [IU]/ML
INJECTION, SOLUTION SUBCUTANEOUS
Qty: 15 ADJUSTABLE DOSE PRE-FILLED PEN SYRINGE | Refills: 3 | Status: SHIPPED | OUTPATIENT
Start: 2024-05-16

## 2024-05-17 NOTE — TELEPHONE ENCOUNTER
Spoke with pharmacist at [facility] he needs clarification on Semglee pen.      Pharmacist states he has a note on patient's Lantus Prescription( which was denied by insurance )not to substitute with Semglee because patient has side effects.     Pharmacist is aware will have to check with our pharmacist at the office as well as send the message to Dr. Clemens for clarification.     Ref # [id number]  [phone]    Please advise if semglee can be filled.

## 2024-05-17 NOTE — TELEPHONE ENCOUNTER
Spoke with Pharmacist she is aware to cancel to cancel the prescription.   Felipe Roman, [facility]  You; Bashir Clemens MD13 minutes ago (3:30 PM)     MD  No he cannot take Semglee.  I am waiting for the pt to provide his most current insurance information so that I can complete a prior authorization for him to fill Lantus.  He has side effects with all other basal insulins tried up until this point.  As of now, I do not have his prescription insurance information in order to have the PA completed.  He already has 3 boxes of Semglee at home that he will not be using.  Thank you.

## 2024-05-20 ENCOUNTER — TELEPHONE (OUTPATIENT)
Age: 79
End: 2024-05-20

## 2024-05-20 NOTE — TELEPHONE ENCOUNTER
Pt states because he was getting sick with SEMGLEE Dr Block was changing him to LANTUS , he states [facility] need a prior auth please advise     [phone]

## 2024-05-28 LAB
BASOPHILS # BLD AUTO: 0.1 X10E3/UL (ref 0–0.2)
BASOPHILS NFR BLD AUTO: 1 %
EOSINOPHIL # BLD AUTO: 0.1 X10E3/UL (ref 0–0.4)
EOSINOPHIL NFR BLD AUTO: 2 %
ERYTHROCYTE [DISTWIDTH] IN BLOOD BY AUTOMATED COUNT: 14 % (ref 11.6–15.4)
HCT VFR BLD AUTO: 32.1 % (ref 37.5–51)
HGB BLD-MCNC: 10.5 G/DL (ref 13–17.7)
IMM GRANULOCYTES # BLD AUTO: 0 X10E3/UL (ref 0–0.1)
IMM GRANULOCYTES NFR BLD AUTO: 0 %
LYMPHOCYTES # BLD AUTO: 1 X10E3/UL (ref 0.7–3.1)
LYMPHOCYTES NFR BLD AUTO: 26 %
MCH RBC QN AUTO: 29.7 PG (ref 26.6–33)
MCHC RBC AUTO-ENTMCNC: 32.7 G/DL (ref 31.5–35.7)
MCV RBC AUTO: 91 FL (ref 79–97)
MONOCYTES # BLD AUTO: 0.4 X10E3/UL (ref 0.1–0.9)
MONOCYTES NFR BLD AUTO: 10 %
NEUTROPHILS # BLD AUTO: 2.3 X10E3/UL (ref 1.4–7)
NEUTROPHILS NFR BLD AUTO: 61 %
PLATELET # BLD AUTO: 217 X10E3/UL (ref 150–450)
RBC # BLD AUTO: 3.53 X10E6/UL (ref 4.14–5.8)
SPECIMEN STATUS REPORT: NORMAL
WBC # BLD AUTO: 3.8 X10E3/UL (ref 3.4–10.8)

## 2024-05-29 LAB
ALBUMIN SERPL-MCNC: 4 G/DL (ref 3.8–4.8)
ALBUMIN/CREAT UR: 544 MG/G CREAT (ref 0–29)
ALBUMIN/GLOB SERPL: 1.7 {RATIO} (ref 1.2–2.2)
ALP SERPL-CCNC: 65 IU/L (ref 44–121)
ALT SERPL-CCNC: 20 IU/L (ref 0–44)
AST SERPL-CCNC: 20 IU/L (ref 0–40)
BILIRUB SERPL-MCNC: 0.3 MG/DL (ref 0–1.2)
BUN SERPL-MCNC: 20 MG/DL (ref 8–27)
BUN/CREAT SERPL: 13 (ref 10–24)
CALCIUM SERPL-MCNC: 9.3 MG/DL (ref 8.6–10.2)
CHLORIDE SERPL-SCNC: 106 MMOL/L (ref 96–106)
CO2 SERPL-SCNC: 21 MMOL/L (ref 20–29)
CREAT SERPL-MCNC: 1.56 MG/DL (ref 0.76–1.27)
CREAT UR-MCNC: 70.1 MG/DL
EGFRCR SERPLBLD CKD-EPI 2021: 45 ML/MIN/1.73
GLOBULIN SER CALC-MCNC: 2.4 G/DL (ref 1.5–4.5)
GLUCOSE SERPL-MCNC: 127 MG/DL (ref 70–99)
HBA1C MFR BLD: 8.2 % (ref 4.8–5.6)
MICROALBUMIN UR-MCNC: 381 UG/ML
POTASSIUM SERPL-SCNC: 4.9 MMOL/L (ref 3.5–5.2)
PROT SERPL-MCNC: 6.4 G/DL (ref 6–8.5)
SODIUM SERPL-SCNC: 139 MMOL/L (ref 134–144)

## 2024-06-01 DIAGNOSIS — Z79.4 TYPE 2 DIABETES MELLITUS WITH STAGE 3B CHRONIC KIDNEY DISEASE, WITH LONG-TERM CURRENT USE OF INSULIN (HCC): ICD-10-CM

## 2024-06-01 DIAGNOSIS — N18.32 TYPE 2 DIABETES MELLITUS WITH STAGE 3B CHRONIC KIDNEY DISEASE, WITH LONG-TERM CURRENT USE OF INSULIN (HCC): ICD-10-CM

## 2024-06-01 DIAGNOSIS — E11.22 TYPE 2 DIABETES MELLITUS WITH STAGE 3B CHRONIC KIDNEY DISEASE, WITH LONG-TERM CURRENT USE OF INSULIN (HCC): ICD-10-CM

## 2024-06-01 RX ORDER — INSULIN GLARGINE 100 [IU]/ML
INJECTION, SOLUTION SUBCUTANEOUS
Qty: 45 ML | Refills: 3 | Status: SHIPPED | OUTPATIENT
Start: 2024-06-01

## 2024-06-01 RX ORDER — INSULIN GLARGINE 100 [IU]/ML
INJECTION, SOLUTION SUBCUTANEOUS
Refills: 0 | OUTPATIENT
Start: 2024-06-01

## 2024-07-02 ENCOUNTER — TELEPHONE (OUTPATIENT)
Facility: CLINIC | Age: 79
End: 2024-07-02

## 2024-07-03 RX ORDER — LISINOPRIL 40 MG/1
40 TABLET ORAL DAILY
Qty: 90 TABLET | Refills: 3 | Status: SHIPPED | OUTPATIENT
Start: 2024-07-03

## 2024-07-09 NOTE — PROGRESS NOTES
Pharmacy Progress Note - Diabetes Management       Assessment / Plan:   Diabetes Management:  Per ADA guidelines, Pt's A1c is not at goal of < 7%.  Pt's AGP report from past 14 days shows time in target range 81%, average glucose 152 mg/dL, GMI 6.9%.  His basal control is adequate without hypoglycemia.  He continues to have post-prandial elevations that correct back to baseline.  Advised to cut back on carb intake to prevent these elevations.  Will maintain his current tx and will reassess with CGM data in 6 weeks.     Nutrition/Lifestyle Modifications:  - Educated pt on the importance of moderating carbohydrate intake. Reviewed sources of carbohydrates and method to help determine appropriate portion sizes (e.g., Diabetes Plate Method).  - Advised patient to avoid sugar-sweetened beverages and replace with water or diet/zero sugar option.  - Recommend ~30 minutes consistent, moderately intensive, exercise/day or ~150 minutes/week. Start small, stay consistent, and increase length and types of exercise, as tolerated.       Patient will return to clinic in 6 week(s) for follow up.        S/O: Mr. Lucio Vance ., a 78 y.o. 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  Pt was seen today for diabetes management.  Patient's last A1c was:   Hemoglobin A1C   Date Value Ref Range Status   05/28/2024 8.2 (H) 4.8 - 5.6 % Final     Comment:                 Prediabetes: 5.7 - 6.4           Diabetes: >6.4           Glycemic control for adults with diabetes: <7.0       Hemoglobin A1C, POC   Date Value Ref Range Status   09/01/2023 8.3 % Final       Interim update: Pt was

## 2024-07-10 ENCOUNTER — PHARMACY VISIT (OUTPATIENT)
Facility: CLINIC | Age: 79
End: 2024-07-10

## 2024-07-10 DIAGNOSIS — E11.22 TYPE 2 DIABETES MELLITUS WITH STAGE 3B CHRONIC KIDNEY DISEASE, WITH LONG-TERM CURRENT USE OF INSULIN (HCC): Primary | ICD-10-CM

## 2024-07-10 DIAGNOSIS — N18.32 TYPE 2 DIABETES MELLITUS WITH STAGE 3B CHRONIC KIDNEY DISEASE, WITH LONG-TERM CURRENT USE OF INSULIN (HCC): Primary | ICD-10-CM

## 2024-07-10 DIAGNOSIS — Z79.4 TYPE 2 DIABETES MELLITUS WITH STAGE 3B CHRONIC KIDNEY DISEASE, WITH LONG-TERM CURRENT USE OF INSULIN (HCC): Primary | ICD-10-CM

## 2024-07-23 ENCOUNTER — OFFICE VISIT (OUTPATIENT)
Age: 79
End: 2024-07-23
Payer: COMMERCIAL

## 2024-07-23 VITALS
BODY MASS INDEX: 31.02 KG/M2 | HEART RATE: 70 BPM | DIASTOLIC BLOOD PRESSURE: 82 MMHG | OXYGEN SATURATION: 98 % | HEIGHT: 66 IN | WEIGHT: 193 LBS | SYSTOLIC BLOOD PRESSURE: 152 MMHG

## 2024-07-23 DIAGNOSIS — I10 ESSENTIAL HYPERTENSION: ICD-10-CM

## 2024-07-23 DIAGNOSIS — E78.2 MIXED HYPERLIPIDEMIA: ICD-10-CM

## 2024-07-23 DIAGNOSIS — R06.02 SHORTNESS OF BREATH: ICD-10-CM

## 2024-07-23 DIAGNOSIS — R00.2 PALPITATIONS: ICD-10-CM

## 2024-07-23 DIAGNOSIS — N30.00 ACUTE CYSTITIS WITHOUT HEMATURIA: ICD-10-CM

## 2024-07-23 DIAGNOSIS — K59.00 CONSTIPATION, UNSPECIFIED CONSTIPATION TYPE: ICD-10-CM

## 2024-07-23 DIAGNOSIS — R07.9 CHEST PAIN, UNSPECIFIED TYPE: Primary | ICD-10-CM

## 2024-07-23 PROCEDURE — 3079F DIAST BP 80-89 MM HG: CPT | Performed by: INTERNAL MEDICINE

## 2024-07-23 PROCEDURE — 99214 OFFICE O/P EST MOD 30 MIN: CPT | Performed by: INTERNAL MEDICINE

## 2024-07-23 PROCEDURE — G8427 DOCREV CUR MEDS BY ELIG CLIN: HCPCS | Performed by: INTERNAL MEDICINE

## 2024-07-23 PROCEDURE — 93000 ELECTROCARDIOGRAM COMPLETE: CPT | Performed by: INTERNAL MEDICINE

## 2024-07-23 PROCEDURE — G8417 CALC BMI ABV UP PARAM F/U: HCPCS | Performed by: INTERNAL MEDICINE

## 2024-07-23 PROCEDURE — 3077F SYST BP >= 140 MM HG: CPT | Performed by: INTERNAL MEDICINE

## 2024-07-23 PROCEDURE — 1036F TOBACCO NON-USER: CPT | Performed by: INTERNAL MEDICINE

## 2024-07-23 PROCEDURE — 1123F ACP DISCUSS/DSCN MKR DOCD: CPT | Performed by: INTERNAL MEDICINE

## 2024-07-23 ASSESSMENT — ENCOUNTER SYMPTOMS: SHORTNESS OF BREATH: 1

## 2024-07-23 NOTE — PROGRESS NOTES
HISTORY OF PRESENT ILLNESS  Lucio Vance Jr.  78 y.o. male     Chief Complaint   Patient presents with    Follow-up     6 month f/u     Palpitations     Racing palps     Shortness of Breath     SOB with exertion     Dizziness     Dizzy spells        ASSESSMENT and PLAN    The primary encounter diagnosis was Chest pain, unspecified type. Diagnoses of Palpitations, Essential hypertension, Shortness of breath, Mixed hyperlipidemia, Acute cystitis without hematuria, and Constipation, unspecified constipation type were also pertinent to this visit.    Mr. Lucio Vance has no documented history of CAD.  However, he does have documented history of ICA disease.  Around October 2021, he presented with left hand numbness to [facility].  He was then noted to have occluded left ICA and severely diseased right ICA.  He had right CEA performed during that hospitalization.  He also has a longstanding history.  He denies previous history of tobacco use.  He has history of IDDM.  He has history of dyslipidemia on Lipitor.  He did have nuclear scan performed in March 2022 as well as an echocardiogram.  The nuclear scan showed EF greater than 70%, without significant reversible perfusion defect.  It was felt to be low risk.  His echocardiogram revealed EF 55-60%.  No significant regional wall motion normality was noted.  In October 2023, his echocardiogram showed normal EF 66% with PA pressure 29 mmHg.  Because of palpitations, his event monitor showed sinus rhythm with occasional PAC, PVC without significant arrhythmia.  His symptoms corresponded to PAC and sinus rhythm.  His nuclear scan done in February 2024 showed EF 62% with normal perfusion.  Medication regimen reviewed and current cardiac regimen will be continued.  All new testing since the last office visit was independently reviewed by me.    CAD:    He has no documented history of CAD.    PVD: status post right CEA.  HTN:     Upper normal at 152/82.  He states that his home

## 2024-07-23 NOTE — PROGRESS NOTES
Lucio Vance Jr. presents today for   Chief Complaint   Patient presents with    Follow-up     6 month f/u     Palpitations     Racing palps     Shortness of Breath     SOB with exertion     Dizziness     Dizzy spells        Lucio Vance Jr. preferred language for health care discussion is english/other.    Is someone accompanying this pt? yes    Is the patient using any DME equipment during OV? no    Depression Screening:  Depression: Not at risk (7/23/2024)    PHQ-2     PHQ-2 Score: 0        Learning Assessment:  Who is the primary learner? Patient    What is the preferred language for health care of the primary learner? ENGLISH    How does the primary learner prefer to learn new concepts? DEMONSTRATION    Answered By patient    Relationship to Learner SELF           Pt currently taking Anticoagulant therapy? no    Pt currently taking Antiplatelet therapy ? ASA 81 mg 1x daily       Coordination of Care:  1. Have you been to the ER, urgent care clinic since your last visit? Hospitalized since your last visit? no    2. Have you seen or consulted any other health care providers outside of the [facility] since your last visit? Include any pap smears or colon screening. no

## 2024-08-07 ENCOUNTER — TELEPHONE (OUTPATIENT)
Facility: CLINIC | Age: 79
End: 2024-08-07

## 2024-08-07 NOTE — TELEPHONE ENCOUNTER
Pt called he states he needs a prior authorization   For SANGEETHA   [facility]   PHONE # [phone]

## 2024-08-08 NOTE — TELEPHONE ENCOUNTER
Medication approved.  Case ID #  [id number]  PA will  2025    Left message for patient medication was approved.

## 2024-08-19 NOTE — PROGRESS NOTES
Pharmacy Progress Note - Diabetes Management       Assessment / Plan:   Diabetes Management:  Per ADA guidelines, Pt's A1c is not at goal of < 7%.  Pt's AGP report from past 90 days shows time in target range 72%, average glucose 160 mg/dL, GMI 7.1%.  He will likely be close to A1c goal with the next lab draw.  His baseline control is adequate with his current Lantus dose so will maintain this.  He continues to have large and frequent post-prandial elevations from his high-carb meals and snacks.  Encouraged to limit his carb intake to prevent the post-prandial elevations.  May consider a dose increase of Trulicity at follow up if he continues to have the large post-prandial elevations.  Will reassess with CGM data in 6 weeks.  Will have him contact the clinic to inform us which glucometer his insurance will cover.  He will confirm any hypoglycemia on his CGM with the new glucometer.     Nutrition/Lifestyle Modifications:  - Educated pt on the importance of moderating carbohydrate intake. Reviewed sources of carbohydrates and method to help determine appropriate portion sizes (e.g., Diabetes Plate Method).  - Advised patient to avoid sugar-sweetened beverages and replace with water or diet/zero sugar option.  - Recommend ~30 minutes consistent, moderately intensive, exercise/day or ~150 minutes/week. Start small, stay consistent, and increase length and types of exercise, as tolerated.       Patient will return to clinic in 6 week(s) for follow up.        S/O: Mr. Lucio Vance Jr., a 78 y.o. 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  Pt was seen today for

## 2024-08-21 ENCOUNTER — PHARMACY VISIT (OUTPATIENT)
Facility: CLINIC | Age: 79
End: 2024-08-21

## 2024-08-21 ENCOUNTER — TELEPHONE (OUTPATIENT)
Facility: CLINIC | Age: 79
End: 2024-08-21

## 2024-08-21 DIAGNOSIS — N18.32 TYPE 2 DIABETES MELLITUS WITH STAGE 3B CHRONIC KIDNEY DISEASE, WITH LONG-TERM CURRENT USE OF INSULIN (HCC): Primary | ICD-10-CM

## 2024-08-21 DIAGNOSIS — Z79.4 TYPE 2 DIABETES MELLITUS WITH STAGE 3B CHRONIC KIDNEY DISEASE, WITH LONG-TERM CURRENT USE OF INSULIN (HCC): Primary | ICD-10-CM

## 2024-08-21 DIAGNOSIS — E11.22 TYPE 2 DIABETES MELLITUS WITH STAGE 3B CHRONIC KIDNEY DISEASE, WITH LONG-TERM CURRENT USE OF INSULIN (HCC): Primary | ICD-10-CM

## 2024-08-21 RX ORDER — BLOOD-GLUCOSE METER
1 EACH MISCELLANEOUS DAILY
Qty: 1 KIT | Refills: 0 | Status: SHIPPED | OUTPATIENT
Start: 2024-08-21

## 2024-08-21 RX ORDER — LANCETS 30 GAUGE
EACH MISCELLANEOUS
Qty: 200 EACH | Refills: 3 | Status: SHIPPED | OUTPATIENT
Start: 2024-08-21

## 2024-08-21 NOTE — TELEPHONE ENCOUNTER
Will send in glucometer and supplies now that insurance coverage is verified.    Thank you,    Felipe Roman, PharmD, BCACP, BC-[facility]    For Pharmacy Admin Tracking Only    Program: Medical Group  CPA in place:  Yes  Recommendation Provided To: Pharmacy: 3  Intervention Detail: New Rx: 3, reason: Needs Additional Therapy  Intervention Accepted By: Pharmacy: 3  Gap Closed?: Yes   Time Spent (min): 15

## 2024-08-21 NOTE — TELEPHONE ENCOUNTER
Pt states DR Roman was going order him one touch ultra 2 and test strips  he is checking on status of it   [facility]

## 2024-09-05 LAB
BASOPHILS # BLD AUTO: 0 X10E3/UL (ref 0–0.2)
BASOPHILS NFR BLD AUTO: 0 %
EOSINOPHIL # BLD AUTO: 0.1 X10E3/UL (ref 0–0.4)
EOSINOPHIL NFR BLD AUTO: 2 %
ERYTHROCYTE [DISTWIDTH] IN BLOOD BY AUTOMATED COUNT: 13.8 % (ref 11.6–15.4)
HCT VFR BLD AUTO: 31.7 % (ref 37.5–51)
HGB BLD-MCNC: 10.5 G/DL (ref 13–17.7)
IMM GRANULOCYTES # BLD AUTO: 0.1 X10E3/UL (ref 0–0.1)
IMM GRANULOCYTES NFR BLD AUTO: 1 %
LYMPHOCYTES # BLD AUTO: 1.1 X10E3/UL (ref 0.7–3.1)
LYMPHOCYTES NFR BLD AUTO: 18 %
MCH RBC QN AUTO: 29.9 PG (ref 26.6–33)
MCHC RBC AUTO-ENTMCNC: 33.1 G/DL (ref 31.5–35.7)
MCV RBC AUTO: 90 FL (ref 79–97)
MONOCYTES # BLD AUTO: 0.7 X10E3/UL (ref 0.1–0.9)
MONOCYTES NFR BLD AUTO: 12 %
NEUTROPHILS # BLD AUTO: 4 X10E3/UL (ref 1.4–7)
NEUTROPHILS NFR BLD AUTO: 67 %
PLATELET # BLD AUTO: 234 X10E3/UL (ref 150–450)
RBC # BLD AUTO: 3.51 X10E6/UL (ref 4.14–5.8)
SPECIMEN STATUS REPORT: NORMAL
WBC # BLD AUTO: 6 X10E3/UL (ref 3.4–10.8)

## 2024-09-06 LAB
25(OH)D3+25(OH)D2 SERPL-MCNC: 26.4 NG/ML (ref 30–100)
ALBUMIN SERPL-MCNC: 4 G/DL (ref 3.8–4.8)
ALP SERPL-CCNC: 72 IU/L (ref 44–121)
ALT SERPL-CCNC: 33 IU/L (ref 0–44)
AST SERPL-CCNC: 23 IU/L (ref 0–40)
BILIRUB SERPL-MCNC: 0.3 MG/DL (ref 0–1.2)
BUN SERPL-MCNC: 24 MG/DL (ref 8–27)
BUN/CREAT SERPL: 14 (ref 10–24)
CALCIUM SERPL-MCNC: 9.1 MG/DL (ref 8.6–10.2)
CHLORIDE SERPL-SCNC: 107 MMOL/L (ref 96–106)
CHOLEST SERPL-MCNC: 160 MG/DL (ref 100–199)
CO2 SERPL-SCNC: 19 MMOL/L (ref 20–29)
CREAT SERPL-MCNC: 1.75 MG/DL (ref 0.76–1.27)
EGFRCR SERPLBLD CKD-EPI 2021: 39 ML/MIN/1.73
GLOBULIN SER CALC-MCNC: 2.5 G/DL (ref 1.5–4.5)
GLUCOSE SERPL-MCNC: 157 MG/DL (ref 70–99)
HBA1C MFR BLD: 8.3 % (ref 4.8–5.6)
HDLC SERPL-MCNC: 46 MG/DL
LDLC SERPL CALC-MCNC: 84 MG/DL (ref 0–99)
POTASSIUM SERPL-SCNC: 5.3 MMOL/L (ref 3.5–5.2)
PROT SERPL-MCNC: 6.5 G/DL (ref 6–8.5)
SODIUM SERPL-SCNC: 140 MMOL/L (ref 134–144)
TRIGL SERPL-MCNC: 177 MG/DL (ref 0–149)
VLDLC SERPL CALC-MCNC: 30 MG/DL (ref 5–40)

## 2024-09-09 ENCOUNTER — OFFICE VISIT (OUTPATIENT)
Facility: CLINIC | Age: 79
End: 2024-09-09
Payer: COMMERCIAL

## 2024-09-09 VITALS
WEIGHT: 194 LBS | RESPIRATION RATE: 20 BRPM | HEIGHT: 66 IN | OXYGEN SATURATION: 100 % | SYSTOLIC BLOOD PRESSURE: 137 MMHG | BODY MASS INDEX: 31.18 KG/M2 | TEMPERATURE: 98.3 F | HEART RATE: 80 BPM | DIASTOLIC BLOOD PRESSURE: 75 MMHG

## 2024-09-09 DIAGNOSIS — D50.0 IRON DEFICIENCY ANEMIA SECONDARY TO BLOOD LOSS (CHRONIC): ICD-10-CM

## 2024-09-09 DIAGNOSIS — E78.2 MIXED HYPERLIPIDEMIA: ICD-10-CM

## 2024-09-09 DIAGNOSIS — I10 ESSENTIAL (PRIMARY) HYPERTENSION: ICD-10-CM

## 2024-09-09 DIAGNOSIS — N18.32 TYPE 2 DIABETES MELLITUS WITH STAGE 3B CHRONIC KIDNEY DISEASE, WITH LONG-TERM CURRENT USE OF INSULIN (HCC): Primary | ICD-10-CM

## 2024-09-09 DIAGNOSIS — E11.22 TYPE 2 DIABETES MELLITUS WITH STAGE 3B CHRONIC KIDNEY DISEASE, WITH LONG-TERM CURRENT USE OF INSULIN (HCC): Primary | ICD-10-CM

## 2024-09-09 DIAGNOSIS — Z79.4 TYPE 2 DIABETES MELLITUS WITH STAGE 3B CHRONIC KIDNEY DISEASE, WITH LONG-TERM CURRENT USE OF INSULIN (HCC): Primary | ICD-10-CM

## 2024-09-09 DIAGNOSIS — E55.9 VITAMIN D DEFICIENCY: ICD-10-CM

## 2024-09-09 PROCEDURE — 1036F TOBACCO NON-USER: CPT | Performed by: INTERNAL MEDICINE

## 2024-09-09 PROCEDURE — 1123F ACP DISCUSS/DSCN MKR DOCD: CPT | Performed by: INTERNAL MEDICINE

## 2024-09-09 PROCEDURE — 3052F HG A1C>EQUAL 8.0%<EQUAL 9.0%: CPT | Performed by: INTERNAL MEDICINE

## 2024-09-09 PROCEDURE — 3075F SYST BP GE 130 - 139MM HG: CPT | Performed by: INTERNAL MEDICINE

## 2024-09-09 PROCEDURE — 3078F DIAST BP <80 MM HG: CPT | Performed by: INTERNAL MEDICINE

## 2024-09-09 PROCEDURE — G8427 DOCREV CUR MEDS BY ELIG CLIN: HCPCS | Performed by: INTERNAL MEDICINE

## 2024-09-09 PROCEDURE — 99214 OFFICE O/P EST MOD 30 MIN: CPT | Performed by: INTERNAL MEDICINE

## 2024-09-09 PROCEDURE — G8417 CALC BMI ABV UP PARAM F/U: HCPCS | Performed by: INTERNAL MEDICINE

## 2024-09-09 RX ORDER — AMLODIPINE BESYLATE 5 MG/1
5 TABLET ORAL 2 TIMES DAILY
Qty: 180 TABLET | Refills: 3 | Status: SHIPPED | OUTPATIENT
Start: 2024-09-09

## 2024-09-09 RX ORDER — ALBUTEROL SULFATE 90 UG/1
2 AEROSOL, METERED RESPIRATORY (INHALATION) EVERY 4 HOURS PRN
Qty: 3 EACH | Refills: 3 | Status: SHIPPED | OUTPATIENT
Start: 2024-09-09

## 2024-09-09 RX ORDER — ATORVASTATIN CALCIUM 40 MG/1
40 TABLET, FILM COATED ORAL DAILY
Qty: 90 TABLET | Refills: 3 | Status: SHIPPED | OUTPATIENT
Start: 2024-09-09

## 2024-09-09 RX ORDER — FINERENONE 10 MG/1
1 TABLET, FILM COATED ORAL DAILY
COMMUNITY
Start: 2024-09-09 | End: 2024-09-09

## 2024-09-30 NOTE — PROGRESS NOTES
adherence to current regimen?: Yes    Vitals/Labs:  No results found for: \"HBA1C\"  Hemoglobin A1C   Date Value Ref Range Status   09/05/2024 8.3 (H) 4.8 - 5.6 % Final     Comment:                 Prediabetes: 5.7 - 6.4           Diabetes: >6.4           Glycemic control for adults with diabetes: <7.0     05/28/2024 8.2 (H) 4.8 - 5.6 % Final     Comment:                 Prediabetes: 5.7 - 6.4           Diabetes: >6.4           Glycemic control for adults with diabetes: <7.0     02/28/2024 8.3 (H) 4.8 - 5.6 % Final     Comment:                 Prediabetes: 5.7 - 6.4           Diabetes: >6.4           Glycemic control for adults with diabetes: <7.0       Hemoglobin A1C, POC   Date Value Ref Range Status   09/01/2023 8.3 % Final       Screenings/Prevention Parameters:  -Diabetic Eye and Foot Exams:      Diabetes Management   Topic Date Due    Diabetic foot exam  Never done    Diabetic retinal exam  03/07/2024     -Microalbumin / Creatinine ratio:       Lab Results   Component Value Date/Time    MICROALBUR 185.1 04/18/2022 08:56 AM        Lab Results   Component Value Date    MALBCR 544 (H) 05/28/2024    MALBCR 218 (H) 02/28/2024    MALBCR 422 (H) 05/22/2023      No components found for: \"MALBCREARAT\"  -Immunizations:      Immunization History   Administered Date(s) Administered    COVID-19, MODERNA BLUE border, Primary or Immunocompromised, (age 12y+), IM, 100 mcg/0.5mL 03/11/2021, 04/08/2021, 12/23/2021, 04/23/2022    Influenza Trivalent 10/29/2010, 11/28/2011, 11/29/2011, 10/09/2012, 10/18/2013, 10/16/2014, 10/22/2021    Pneumococcal, PCV-13, PREVNAR 13, (age 6w+), IM, 0.5mL 07/28/2015, 07/28/2015    Pneumococcal, PPSV23, PNEUMOVAX 23, (age 2y+), SC/IM, 0.5mL 11/13/2008, 10/29/2010, 01/22/2021    Zoster Live (Zostavax) 12/18/2019    Zoster Recombinant (Shingrix) 12/18/2019       Additional Laboratory Parameters of Interest:   Estimation of renal function:  Lab Results   Component Value Date/Time    LABGLOM 39 
pt own

## 2024-10-02 ENCOUNTER — PHARMACY VISIT (OUTPATIENT)
Facility: CLINIC | Age: 79
End: 2024-10-02

## 2024-10-02 DIAGNOSIS — N18.32 TYPE 2 DIABETES MELLITUS WITH STAGE 3B CHRONIC KIDNEY DISEASE, WITH LONG-TERM CURRENT USE OF INSULIN (HCC): Primary | ICD-10-CM

## 2024-10-02 DIAGNOSIS — E11.22 TYPE 2 DIABETES MELLITUS WITH STAGE 3B CHRONIC KIDNEY DISEASE, WITH LONG-TERM CURRENT USE OF INSULIN (HCC): Primary | ICD-10-CM

## 2024-10-02 DIAGNOSIS — Z79.4 TYPE 2 DIABETES MELLITUS WITH STAGE 3B CHRONIC KIDNEY DISEASE, WITH LONG-TERM CURRENT USE OF INSULIN (HCC): Primary | ICD-10-CM

## 2024-10-02 RX ORDER — DULAGLUTIDE 1.5 MG/.5ML
1.5 INJECTION, SOLUTION SUBCUTANEOUS
Qty: 6 ML | Refills: 3 | Status: SHIPPED | OUTPATIENT
Start: 2024-10-02

## 2024-10-22 NOTE — PATIENT INSTRUCTIONS
Your Visit Summary:     Plan:  - Start Brand Elpidio 10 units every night at bedtime    - STOP HUMALOG    - Start Trulicity 9.85KB weekly if you are able to afford it    Call me with any questions or concerns  Edwina Polanco [phone]    Check and document your blood sugar first thing in the morning (fasting at least 8 hours), 2 hours after a meal, and/or before bedtime. Bring your meter/log to all future visits. Your blood sugar goals:  - Fasting (first thing in the morning)  blood sugar: 80 - 130mg/dL  - 2 hours after a meal: 80 - 180mg/dL    When you experience symptoms of low blood sugar (example: less than 70):  - Confirm low reading by checking your blood sugar.   - Then treat with 15 grams of carbohydrates (one-half cup of juice or regular soda, or 4-5 glucose tablets). - Wait 15 minutes to recheck blood sugar.   - Then eat a protein containing meal/snack to prevent another low blood sugar episode. (example: peanut butter + crackers)    Nutrition:  - When reviewing a nutrition label, focus on the serving size, total calories, fat (and type of fats), total carbohydrates, sugar (and amount of added sugar), amount of fiber (good for your digestive), and amount of protein. Refer to your nutrition label guide for more information.  - For a meal : max 45 - 60 grams of carbohydrates  - For a snack: max 15 grams of carbohydrates  - Reduce amount of saturated and trans fat. Consider more unsaturated fat options as they are better for your heart health.    - Have at least 1 serving of lean fat protein with each meal.    - Increase fiber intake slowly to prevent constipation.   - Substitute fruit juices for the whole fruit    Low carb snack ideas (15 grams total carb or less):    String cheese or babybel with 6 crackers  4 peanut butter crackers  3 cups of popcorn  1 cup raw vegetables with hummus or ranch dip (just need to watch how much dip you use)  Nuts  2 rice cakes  Celery with peanut butter or cream .

## 2024-11-12 NOTE — PROGRESS NOTES
Adherence Monitorin and Scheduled Appointment  Intervention Accepted By: Patient/Caregiver: 2  Gap Closed?: No   Time Spent (min): 30

## 2024-11-13 ENCOUNTER — PHARMACY VISIT (OUTPATIENT)
Facility: CLINIC | Age: 79
End: 2024-11-13

## 2024-11-13 DIAGNOSIS — N18.32 TYPE 2 DIABETES MELLITUS WITH STAGE 3B CHRONIC KIDNEY DISEASE, WITH LONG-TERM CURRENT USE OF INSULIN (HCC): Primary | ICD-10-CM

## 2024-11-13 DIAGNOSIS — Z79.4 TYPE 2 DIABETES MELLITUS WITH STAGE 3B CHRONIC KIDNEY DISEASE, WITH LONG-TERM CURRENT USE OF INSULIN (HCC): Primary | ICD-10-CM

## 2024-11-13 DIAGNOSIS — E11.22 TYPE 2 DIABETES MELLITUS WITH STAGE 3B CHRONIC KIDNEY DISEASE, WITH LONG-TERM CURRENT USE OF INSULIN (HCC): Primary | ICD-10-CM

## 2024-11-18 RX ORDER — ALBUTEROL SULFATE 90 UG/1
INHALANT RESPIRATORY (INHALATION)
Qty: 25.5 G | Refills: 3 | Status: SHIPPED | OUTPATIENT
Start: 2024-11-18

## 2024-12-02 RX ORDER — POLYETHYLENE GLYCOL 3350 17 G/17G
POWDER, FOR SOLUTION ORAL
Qty: 510 G | Refills: 3 | Status: SHIPPED | OUTPATIENT
Start: 2024-12-02

## 2024-12-02 RX ORDER — SEMAGLUTIDE 0.68 MG/ML
INJECTION, SOLUTION SUBCUTANEOUS
Qty: 3 ML | Refills: 1 | OUTPATIENT
Start: 2024-12-02

## 2025-01-01 LAB
25(OH)D3+25(OH)D2 SERPL-MCNC: 26.8 NG/ML (ref 30–100)
ALBUMIN SERPL-MCNC: 4 G/DL (ref 3.8–4.8)
ALP SERPL-CCNC: 80 IU/L (ref 44–121)
ALT SERPL-CCNC: 16 IU/L (ref 0–44)
AST SERPL-CCNC: 17 IU/L (ref 0–40)
BASOPHILS # BLD AUTO: 0 X10E3/UL (ref 0–0.2)
BASOPHILS NFR BLD AUTO: 0 %
BILIRUB SERPL-MCNC: 0.3 MG/DL (ref 0–1.2)
BUN SERPL-MCNC: 24 MG/DL (ref 8–27)
BUN/CREAT SERPL: 15 (ref 10–24)
CALCIUM SERPL-MCNC: 8.9 MG/DL (ref 8.6–10.2)
CHLORIDE SERPL-SCNC: 105 MMOL/L (ref 96–106)
CHOLEST SERPL-MCNC: 188 MG/DL (ref 100–199)
CO2 SERPL-SCNC: 21 MMOL/L (ref 20–29)
CREAT SERPL-MCNC: 1.56 MG/DL (ref 0.76–1.27)
EGFRCR SERPLBLD CKD-EPI 2021: 45 ML/MIN/1.73
EOSINOPHIL # BLD AUTO: 0.1 X10E3/UL (ref 0–0.4)
EOSINOPHIL NFR BLD AUTO: 3 %
ERYTHROCYTE [DISTWIDTH] IN BLOOD BY AUTOMATED COUNT: 13.7 % (ref 11.6–15.4)
GLOBULIN SER CALC-MCNC: 2.4 G/DL (ref 1.5–4.5)
GLUCOSE SERPL-MCNC: 227 MG/DL (ref 70–99)
HBA1C MFR BLD: 7.9 % (ref 4.8–5.6)
HCT VFR BLD AUTO: 30.5 % (ref 37.5–51)
HDLC SERPL-MCNC: 46 MG/DL
HGB BLD-MCNC: 9.7 G/DL (ref 13–17.7)
IMM GRANULOCYTES # BLD AUTO: 0 X10E3/UL (ref 0–0.1)
IMM GRANULOCYTES NFR BLD AUTO: 0 %
LDLC SERPL CALC-MCNC: 114 MG/DL (ref 0–99)
LYMPHOCYTES # BLD AUTO: 1.1 X10E3/UL (ref 0.7–3.1)
LYMPHOCYTES NFR BLD AUTO: 25 %
MCH RBC QN AUTO: 29.8 PG (ref 26.6–33)
MCHC RBC AUTO-ENTMCNC: 31.8 G/DL (ref 31.5–35.7)
MCV RBC AUTO: 94 FL (ref 79–97)
MONOCYTES # BLD AUTO: 0.5 X10E3/UL (ref 0.1–0.9)
MONOCYTES NFR BLD AUTO: 11 %
NEUTROPHILS # BLD AUTO: 2.8 X10E3/UL (ref 1.4–7)
NEUTROPHILS NFR BLD AUTO: 61 %
PLATELET # BLD AUTO: 234 X10E3/UL (ref 150–450)
POTASSIUM SERPL-SCNC: 4.7 MMOL/L (ref 3.5–5.2)
PROT SERPL-MCNC: 6.4 G/DL (ref 6–8.5)
RBC # BLD AUTO: 3.26 X10E6/UL (ref 4.14–5.8)
SODIUM SERPL-SCNC: 137 MMOL/L (ref 134–144)
SPECIMEN STATUS REPORT: NORMAL
TRIGL SERPL-MCNC: 161 MG/DL (ref 0–149)
VLDLC SERPL CALC-MCNC: 28 MG/DL (ref 5–40)
WBC # BLD AUTO: 4.5 X10E3/UL (ref 3.4–10.8)

## 2025-01-08 NOTE — PROGRESS NOTES
2/10/2025  8:30 AM Felipe Roman, [facility] DEP   2025 10:20 AM Bashir Clemens MD [facility]       Patient verbalized understanding of the information presented and all of the patient’s questions were answered.  AVS was handed to the patient. Patient advised to call the office with any additional questions or concerns.    Notifications of recommendations will be sent to Bashir Clemens MD for review.      Thank you for the consult,  Felipe Roman, PharmD, BCACP, BC-ADM        For Pharmacy Admin Tracking Only    Program: Medical Group  CPA in place:  Yes  Recommendation Provided To: Patient/Caregiver: 3 via In person  Intervention Detail: Adherence Monitorin and Scheduled Appointment  Intervention Accepted By: Patient/Caregiver: 3  Gap Closed?: No   Time Spent (min): 30

## 2025-01-10 ENCOUNTER — TELEPHONE (OUTPATIENT)
Facility: CLINIC | Age: 80
End: 2025-01-10

## 2025-01-10 ENCOUNTER — OFFICE VISIT (OUTPATIENT)
Facility: CLINIC | Age: 80
End: 2025-01-10
Payer: COMMERCIAL

## 2025-01-10 ENCOUNTER — PHARMACY VISIT (OUTPATIENT)
Facility: CLINIC | Age: 80
End: 2025-01-10

## 2025-01-10 VITALS
DIASTOLIC BLOOD PRESSURE: 60 MMHG | RESPIRATION RATE: 20 BRPM | SYSTOLIC BLOOD PRESSURE: 110 MMHG | TEMPERATURE: 97.5 F | OXYGEN SATURATION: 99 % | BODY MASS INDEX: 30.53 KG/M2 | HEIGHT: 66 IN | HEART RATE: 77 BPM | WEIGHT: 190 LBS

## 2025-01-10 DIAGNOSIS — E11.22 TYPE 2 DIABETES MELLITUS WITH STAGE 3B CHRONIC KIDNEY DISEASE, WITH LONG-TERM CURRENT USE OF INSULIN (HCC): Primary | ICD-10-CM

## 2025-01-10 DIAGNOSIS — I10 ESSENTIAL (PRIMARY) HYPERTENSION: ICD-10-CM

## 2025-01-10 DIAGNOSIS — N18.32 TYPE 2 DIABETES MELLITUS WITH STAGE 3B CHRONIC KIDNEY DISEASE, WITH LONG-TERM CURRENT USE OF INSULIN (HCC): Primary | ICD-10-CM

## 2025-01-10 DIAGNOSIS — E78.2 MIXED HYPERLIPIDEMIA: ICD-10-CM

## 2025-01-10 DIAGNOSIS — Z79.4 TYPE 2 DIABETES MELLITUS WITH STAGE 3B CHRONIC KIDNEY DISEASE, WITH LONG-TERM CURRENT USE OF INSULIN (HCC): Primary | ICD-10-CM

## 2025-01-10 DIAGNOSIS — D50.0 IRON DEFICIENCY ANEMIA SECONDARY TO BLOOD LOSS (CHRONIC): ICD-10-CM

## 2025-01-10 DIAGNOSIS — E55.9 VITAMIN D DEFICIENCY: ICD-10-CM

## 2025-01-10 PROCEDURE — 99214 OFFICE O/P EST MOD 30 MIN: CPT | Performed by: INTERNAL MEDICINE

## 2025-01-10 PROCEDURE — G8417 CALC BMI ABV UP PARAM F/U: HCPCS | Performed by: INTERNAL MEDICINE

## 2025-01-10 PROCEDURE — 3078F DIAST BP <80 MM HG: CPT | Performed by: INTERNAL MEDICINE

## 2025-01-10 PROCEDURE — G8427 DOCREV CUR MEDS BY ELIG CLIN: HCPCS | Performed by: INTERNAL MEDICINE

## 2025-01-10 PROCEDURE — 1036F TOBACCO NON-USER: CPT | Performed by: INTERNAL MEDICINE

## 2025-01-10 PROCEDURE — 3074F SYST BP LT 130 MM HG: CPT | Performed by: INTERNAL MEDICINE

## 2025-01-10 PROCEDURE — 1123F ACP DISCUSS/DSCN MKR DOCD: CPT | Performed by: INTERNAL MEDICINE

## 2025-01-10 RX ORDER — BENZONATATE 100 MG/1
100 CAPSULE ORAL 2 TIMES DAILY PRN
Qty: 60 CAPSULE | Refills: 0 | Status: SHIPPED | OUTPATIENT
Start: 2025-01-10

## 2025-01-10 NOTE — TELEPHONE ENCOUNTER
----- Message from Danielle BLOOM sent at 1/10/2025  9:28 AM EST -----  Can we please put in lab orders for patient so I can mail them to him.

## 2025-01-10 NOTE — PROGRESS NOTES
Subjective:       Chief Complaint  The patient presents for follow up of diabetes, hypertension and high cholesterol.        JANIE Vance Jr. is a 79 y.o. male seen for follow up of diabetes.   Healso has hypertension and hyperlipidemia. Diabetes status uncontrolled, pt is tolerating Lantus  insulin 62 units and not having any significantly low blood sugars is being followed by Pharm.D  has him on Trulicity 1.5 mg.    Patient has been very sensitive to medications in the past.  He is c/o itching on his arms currently for which he uses benadryl gel with improvement. Hypertension well controlled,  on lisinopril 40 mg and coreg 25 mg BID, and Norvasc 10 mg. He was taken off  HCTZ 25 mg daily by Renal since he was started on Kerendia 10 mg by Renal for his CKD..  Patient encouraged to continue to monitor his blood pressure at home and be compliant with taking his medications.  Hyperlipidemia no significant medication side effects noted,  borderline controlled on  Lipitor 40 mg. . He has h/o CVA and right carotid endarterectomy so AIM is LDL<70. Pt will try to be more compliant with taking the Lipitor 40 mg.      Diet and Lifestyle: generally follows a low fat low cholesterol diet, does not rigorously follow a diabetic diet, exercises sporadically    Home BP Monitoring: is controlled at home,    Diabetic Review of Systems - home glucose monitoring: is performed regularly, 3x/day. He is on CGM with PharmD who also treats his DM.    Other symptoms and concerns: Patient has a history of asbestosis exposure.  Consider chest x-ray follow-up in the near future.      Discussed the patient's BMI with him.  The BMI follow up plan is as follows: I have counseled this patient on diet and exercise regimens.    [place]  It has been over 5 years.  Cologuard done June 2021 was negative.  At age 75 no further testing needed unless patient has symptoms.    Patient has a history of asthma

## 2025-01-10 NOTE — PROGRESS NOTES
Lucio Vance Jr. presents today for   Chief Complaint   Patient presents with    Diabetes    Hypertension    Cholesterol Problem     4 month follow up            \"Have you been to the ER, urgent care clinic since your last visit?  Hospitalized since your last visit?\"    NO    “Have you seen or consulted any other health care providers outside of [facility] since your last visit?”    Yes, Dr. Saab nephrology

## 2025-01-24 RX ORDER — CARVEDILOL 25 MG/1
25 TABLET ORAL 2 TIMES DAILY
Qty: 180 TABLET | Refills: 3 | Status: SHIPPED | OUTPATIENT
Start: 2025-01-24

## 2025-02-06 NOTE — PROGRESS NOTES
Pharmacy Progress Note - Diabetes Management       Assessment / Plan:   Diabetes Management:  Per ADA guidelines, Pt's A1c is not at goal of < 7%.  Pt's AGP report from the past 14 days shows time in target range 88%, average glucose 146 mg/dL, and GMI 6.8%.  Pt's glycemic control has improved with increased adherence and consistent injection location in his abdomen.  His overall basal values are elevated, but he has FBG < 100 mg/dL too frequently to increase the dose of his Lantus safely at this time.  Will maintain his current tx and will reassess with CGM data in 6 weeks.     Nutrition/Lifestyle Modifications:  - Educated pt on the importance of moderating carbohydrate intake. Reviewed sources of carbohydrates and method to help determine appropriate portion sizes (e.g., Diabetes Plate Method).  - Advised patient to avoid sugar-sweetened beverages and replace with water or diet/zero sugar option.  - Recommend ~30 minutes consistent, moderately intensive, exercise/day or ~150 minutes/week. Start small, stay consistent, and increase length and types of exercise, as tolerated.       Patient will return to clinic in 6 week(s) for follow up.        S/O: Mr. Lucio Vance Buster, a 79 y.o. 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  Pt was seen today for diabetes management.  Patient's last A1c was:   Hemoglobin A1C   Date Value Ref Range Status   12/31/2024 7.9 (H) 4.8 - 5.6 % Final     Comment:                 Prediabetes: 5.7 - 6.4           Diabetes: >6.4           Glycemic control for adults with diabetes: <7.0       Hemoglobin A1C, POC   Date Value Ref Range

## 2025-02-10 ENCOUNTER — PHARMACY VISIT (OUTPATIENT)
Facility: CLINIC | Age: 80
End: 2025-02-10

## 2025-02-10 DIAGNOSIS — N18.32 TYPE 2 DIABETES MELLITUS WITH STAGE 3B CHRONIC KIDNEY DISEASE, WITH LONG-TERM CURRENT USE OF INSULIN (HCC): Primary | ICD-10-CM

## 2025-02-10 DIAGNOSIS — Z79.4 TYPE 2 DIABETES MELLITUS WITH STAGE 3B CHRONIC KIDNEY DISEASE, WITH LONG-TERM CURRENT USE OF INSULIN (HCC): Primary | ICD-10-CM

## 2025-02-10 DIAGNOSIS — E11.22 TYPE 2 DIABETES MELLITUS WITH STAGE 3B CHRONIC KIDNEY DISEASE, WITH LONG-TERM CURRENT USE OF INSULIN (HCC): Primary | ICD-10-CM

## 2025-03-10 ENCOUNTER — TELEPHONE (OUTPATIENT)
Facility: CLINIC | Age: 80
End: 2025-03-10

## 2025-03-10 DIAGNOSIS — N18.32 TYPE 2 DIABETES MELLITUS WITH STAGE 3B CHRONIC KIDNEY DISEASE, WITH LONG-TERM CURRENT USE OF INSULIN (HCC): ICD-10-CM

## 2025-03-10 DIAGNOSIS — E11.22 TYPE 2 DIABETES MELLITUS WITH STAGE 3B CHRONIC KIDNEY DISEASE, WITH LONG-TERM CURRENT USE OF INSULIN (HCC): ICD-10-CM

## 2025-03-10 DIAGNOSIS — Z79.4 TYPE 2 DIABETES MELLITUS WITH STAGE 3B CHRONIC KIDNEY DISEASE, WITH LONG-TERM CURRENT USE OF INSULIN (HCC): ICD-10-CM

## 2025-03-10 NOTE — TELEPHONE ENCOUNTER
PCP: Bashir Clemens MD    Refill Request     LAST OFFICE VISIT: 1/10/25      Medication:   Continuous Glucose Sensor (FREESTYLE CARLOS 2 SENSOR[place] [facility] [place]     Dispense: 3 month supply       Pharmacy [id number] [place] [id number] - P [phone] - F [phone] [[id number]]       Future Appointments   Date Time Provider Department Center   3/26/2025  9:00 AM Felipe Roman [facility] ECC DEP   4/8/2025  1:00 PM Juancho Carlos MD [facility]   4/17/2025 10:20 AM Bashir Clemens MD [facility] DEP

## 2025-03-21 DIAGNOSIS — N18.32 TYPE 2 DIABETES MELLITUS WITH STAGE 3B CHRONIC KIDNEY DISEASE, WITH LONG-TERM CURRENT USE OF INSULIN (HCC): ICD-10-CM

## 2025-03-21 DIAGNOSIS — Z79.4 TYPE 2 DIABETES MELLITUS WITH STAGE 3B CHRONIC KIDNEY DISEASE, WITH LONG-TERM CURRENT USE OF INSULIN (HCC): ICD-10-CM

## 2025-03-21 DIAGNOSIS — E11.22 TYPE 2 DIABETES MELLITUS WITH STAGE 3B CHRONIC KIDNEY DISEASE, WITH LONG-TERM CURRENT USE OF INSULIN (HCC): ICD-10-CM

## 2025-03-21 NOTE — TELEPHONE ENCOUNTER
Refill request via fax     Medication: Continuous Glucose  (FREESTYLE CARLOS 2 READER) JAYASHREE   Quantity: 1each  Pharmacy: [facility] #[id number] [place] [id number] - P [phone] - F [phone] [[id number]]   Last Fill: 05/15/2024    PCP: Bashir Clemens MD    LAST OFFICE VISIT: 01/10/2025      Future Appointments   Date Time Provider Department Center   3/26/2025  9:00 AM Felipe Roman [facility] DEP   4/8/2025  1:00 PM Juancho Carlos MD [facility] BS [facility]   4/17/2025 10:20 AM Bashir Clemens MD [facility] DEP

## 2025-04-08 ENCOUNTER — OFFICE VISIT (OUTPATIENT)
Age: 80
End: 2025-04-08
Payer: COMMERCIAL

## 2025-04-08 VITALS
SYSTOLIC BLOOD PRESSURE: 200 MMHG | BODY MASS INDEX: 30.7 KG/M2 | WEIGHT: 191 LBS | OXYGEN SATURATION: 98 % | HEIGHT: 66 IN | HEART RATE: 78 BPM | DIASTOLIC BLOOD PRESSURE: 102 MMHG

## 2025-04-08 DIAGNOSIS — N30.00 ACUTE CYSTITIS WITHOUT HEMATURIA: ICD-10-CM

## 2025-04-08 DIAGNOSIS — I10 ESSENTIAL HYPERTENSION: ICD-10-CM

## 2025-04-08 DIAGNOSIS — R07.9 CHEST PAIN, UNSPECIFIED TYPE: Primary | ICD-10-CM

## 2025-04-08 DIAGNOSIS — K59.00 CONSTIPATION, UNSPECIFIED CONSTIPATION TYPE: ICD-10-CM

## 2025-04-08 DIAGNOSIS — E78.2 MIXED HYPERLIPIDEMIA: ICD-10-CM

## 2025-04-08 DIAGNOSIS — R06.02 SHORTNESS OF BREATH: ICD-10-CM

## 2025-04-08 DIAGNOSIS — R00.2 PALPITATIONS: ICD-10-CM

## 2025-04-08 PROCEDURE — 1036F TOBACCO NON-USER: CPT | Performed by: INTERNAL MEDICINE

## 2025-04-08 PROCEDURE — 99214 OFFICE O/P EST MOD 30 MIN: CPT | Performed by: INTERNAL MEDICINE

## 2025-04-08 PROCEDURE — 3077F SYST BP >= 140 MM HG: CPT | Performed by: INTERNAL MEDICINE

## 2025-04-08 PROCEDURE — 3080F DIAST BP >= 90 MM HG: CPT | Performed by: INTERNAL MEDICINE

## 2025-04-08 PROCEDURE — G8417 CALC BMI ABV UP PARAM F/U: HCPCS | Performed by: INTERNAL MEDICINE

## 2025-04-08 PROCEDURE — 1123F ACP DISCUSS/DSCN MKR DOCD: CPT | Performed by: INTERNAL MEDICINE

## 2025-04-08 PROCEDURE — 93000 ELECTROCARDIOGRAM COMPLETE: CPT | Performed by: INTERNAL MEDICINE

## 2025-04-08 PROCEDURE — G8427 DOCREV CUR MEDS BY ELIG CLIN: HCPCS | Performed by: INTERNAL MEDICINE

## 2025-04-08 RX ORDER — ISOSORBIDE MONONITRATE 30 MG/1
30 TABLET, EXTENDED RELEASE ORAL DAILY
Qty: 30 TABLET | Refills: 6 | Status: SHIPPED | OUTPATIENT
Start: 2025-04-08

## 2025-04-08 ASSESSMENT — PATIENT HEALTH QUESTIONNAIRE - PHQ9
SUM OF ALL RESPONSES TO PHQ QUESTIONS 1-9: 0
SUM OF ALL RESPONSES TO PHQ QUESTIONS 1-9: 0
1. LITTLE INTEREST OR PLEASURE IN DOING THINGS: NOT AT ALL
SUM OF ALL RESPONSES TO PHQ QUESTIONS 1-9: 0
SUM OF ALL RESPONSES TO PHQ QUESTIONS 1-9: 0
2. FEELING DOWN, DEPRESSED OR HOPELESS: NOT AT ALL

## 2025-04-08 NOTE — PROGRESS NOTES
Lucio Vance Jr. presents today for   Chief Complaint   Patient presents with    Follow-up     Overdue f/u     Palpitations     Pounding palps        Lucio Vance Jr. preferred language for health care discussion is english/other.    Is someone accompanying this pt? no    Is the patient using any DME equipment during OV? no    Depression Screening:  Depression: Not at risk (4/8/2025)    PHQ-2     PHQ-2 Score: 0        Learning Assessment:  Who is the primary learner? Patient    What is the preferred language for health care of the primary learner? ENGLISH    How does the primary learner prefer to learn new concepts? DEMONSTRATION    Answered By patient    Relationship to Learner SELF           Pt currently taking Anticoagulant therapy? no    Pt currently taking Antiplatelet therapy ? ASA 81 mg QD       Coordination of Care:  1. Have you been to the ER, urgent care clinic since your last visit? Hospitalized since your last visit? no    2. Have you seen or consulted any other health care providers outside of the [facility] since your last visit? Include any pap smears or colon screening. no    
12:00 AM     12/31/2024 12:00 AM    LDL 74 02/28/2024 10:26 AM        BP Readings from Last 3 Encounters:   04/08/25 (!) 190/92   01/10/25 110/60   09/09/24 137/75        Pulse Readings from Last 3 Encounters:   04/08/25 78   01/10/25 77   09/09/24 80       Wt Readings from Last 3 Encounters:   04/08/25 86.6 kg (191 lb)   01/10/25 86.2 kg (190 lb)   09/09/24 88 kg (194 lb)         EKG: normal sinus rhythm, unchanged from previous tracings.     Juancho Carlos MD   4/8/2025

## 2025-04-08 NOTE — PATIENT INSTRUCTIONS
Please bring your blood pressure cuff to your next appointment.   Please continue to monitor your blood pressure at home.     Start isosorbide mononitrate (imdur) 30 mg. Take 1 tablet once daily.

## 2025-04-11 LAB
25(OH)D3+25(OH)D2 SERPL-MCNC: 62.5 NG/ML (ref 30–100)
ALBUMIN SERPL-MCNC: 4.3 G/DL (ref 3.8–4.8)
ALBUMIN/CREAT UR: 214 MG/G CREAT (ref 0–29)
ALP SERPL-CCNC: 93 IU/L (ref 44–121)
ALT SERPL-CCNC: 15 IU/L (ref 0–44)
AST SERPL-CCNC: 15 IU/L (ref 0–40)
BASOPHILS # BLD AUTO: 0 X10E3/UL (ref 0–0.2)
BASOPHILS NFR BLD AUTO: 0 %
BILIRUB SERPL-MCNC: 0.2 MG/DL (ref 0–1.2)
BUN SERPL-MCNC: 29 MG/DL (ref 8–27)
BUN/CREAT SERPL: 15 (ref 10–24)
CALCIUM SERPL-MCNC: 9.3 MG/DL (ref 8.6–10.2)
CHLORIDE SERPL-SCNC: 104 MMOL/L (ref 96–106)
CHOLEST SERPL-MCNC: 127 MG/DL (ref 100–199)
CO2 SERPL-SCNC: 19 MMOL/L (ref 20–29)
CREAT SERPL-MCNC: 1.9 MG/DL (ref 0.76–1.27)
CREAT UR-MCNC: 85.5 MG/DL
EGFRCR SERPLBLD CKD-EPI 2021: 35 ML/MIN/1.73
EOSINOPHIL # BLD AUTO: 0 X10E3/UL (ref 0–0.4)
EOSINOPHIL NFR BLD AUTO: 0 %
ERYTHROCYTE [DISTWIDTH] IN BLOOD BY AUTOMATED COUNT: 14.1 % (ref 11.6–15.4)
GLOBULIN SER CALC-MCNC: 2 G/DL (ref 1.5–4.5)
GLUCOSE SERPL-MCNC: 173 MG/DL (ref 70–99)
HBA1C MFR BLD: 7.9 % (ref 4.8–5.6)
HCT VFR BLD AUTO: 28.7 % (ref 37.5–51)
HDLC SERPL-MCNC: 43 MG/DL
HGB BLD-MCNC: 9 G/DL (ref 13–17.7)
IMM GRANULOCYTES # BLD AUTO: 0 X10E3/UL (ref 0–0.1)
IMM GRANULOCYTES NFR BLD AUTO: 0 %
LDLC SERPL CALC-MCNC: 66 MG/DL (ref 0–99)
LYMPHOCYTES # BLD AUTO: 1.1 X10E3/UL (ref 0.7–3.1)
LYMPHOCYTES NFR BLD AUTO: 24 %
MCH RBC QN AUTO: 28.3 PG (ref 26.6–33)
MCHC RBC AUTO-ENTMCNC: 31.4 G/DL (ref 31.5–35.7)
MCV RBC AUTO: 90 FL (ref 79–97)
MICROALBUMIN UR-MCNC: 182.6 UG/ML
MONOCYTES # BLD AUTO: 0.4 X10E3/UL (ref 0.1–0.9)
MONOCYTES NFR BLD AUTO: 9 %
NEUTROPHILS # BLD AUTO: 3.1 X10E3/UL (ref 1.4–7)
NEUTROPHILS NFR BLD AUTO: 67 %
PLATELET # BLD AUTO: 267 X10E3/UL (ref 150–450)
POTASSIUM SERPL-SCNC: 5.1 MMOL/L (ref 3.5–5.2)
PROT SERPL-MCNC: 6.3 G/DL (ref 6–8.5)
RBC # BLD AUTO: 3.18 X10E6/UL (ref 4.14–5.8)
SODIUM SERPL-SCNC: 139 MMOL/L (ref 134–144)
TRIGL SERPL-MCNC: 97 MG/DL (ref 0–149)
VLDLC SERPL CALC-MCNC: 18 MG/DL (ref 5–40)
WBC # BLD AUTO: 4.6 X10E3/UL (ref 3.4–10.8)

## 2025-04-17 ENCOUNTER — OFFICE VISIT (OUTPATIENT)
Facility: CLINIC | Age: 80
End: 2025-04-17
Payer: COMMERCIAL

## 2025-04-17 VITALS
SYSTOLIC BLOOD PRESSURE: 126 MMHG | WEIGHT: 190 LBS | DIASTOLIC BLOOD PRESSURE: 68 MMHG | BODY MASS INDEX: 30.53 KG/M2 | TEMPERATURE: 97.6 F | HEART RATE: 78 BPM | OXYGEN SATURATION: 97 % | HEIGHT: 66 IN | RESPIRATION RATE: 20 BRPM

## 2025-04-17 DIAGNOSIS — N18.32 TYPE 2 DIABETES MELLITUS WITH STAGE 3B CHRONIC KIDNEY DISEASE, WITH LONG-TERM CURRENT USE OF INSULIN (HCC): Primary | ICD-10-CM

## 2025-04-17 DIAGNOSIS — I10 ESSENTIAL (PRIMARY) HYPERTENSION: ICD-10-CM

## 2025-04-17 DIAGNOSIS — E78.2 MIXED HYPERLIPIDEMIA: ICD-10-CM

## 2025-04-17 DIAGNOSIS — E55.9 VITAMIN D DEFICIENCY: ICD-10-CM

## 2025-04-17 DIAGNOSIS — D50.0 IRON DEFICIENCY ANEMIA SECONDARY TO BLOOD LOSS (CHRONIC): ICD-10-CM

## 2025-04-17 DIAGNOSIS — Z79.4 TYPE 2 DIABETES MELLITUS WITH STAGE 3B CHRONIC KIDNEY DISEASE, WITH LONG-TERM CURRENT USE OF INSULIN (HCC): Primary | ICD-10-CM

## 2025-04-17 DIAGNOSIS — E11.22 TYPE 2 DIABETES MELLITUS WITH STAGE 3B CHRONIC KIDNEY DISEASE, WITH LONG-TERM CURRENT USE OF INSULIN (HCC): Primary | ICD-10-CM

## 2025-04-17 PROCEDURE — 1036F TOBACCO NON-USER: CPT | Performed by: INTERNAL MEDICINE

## 2025-04-17 PROCEDURE — 3051F HG A1C>EQUAL 7.0%<8.0%: CPT | Performed by: INTERNAL MEDICINE

## 2025-04-17 PROCEDURE — 3074F SYST BP LT 130 MM HG: CPT | Performed by: INTERNAL MEDICINE

## 2025-04-17 PROCEDURE — G8427 DOCREV CUR MEDS BY ELIG CLIN: HCPCS | Performed by: INTERNAL MEDICINE

## 2025-04-17 PROCEDURE — G8417 CALC BMI ABV UP PARAM F/U: HCPCS | Performed by: INTERNAL MEDICINE

## 2025-04-17 PROCEDURE — 3078F DIAST BP <80 MM HG: CPT | Performed by: INTERNAL MEDICINE

## 2025-04-17 PROCEDURE — 1123F ACP DISCUSS/DSCN MKR DOCD: CPT | Performed by: INTERNAL MEDICINE

## 2025-04-17 PROCEDURE — 99214 OFFICE O/P EST MOD 30 MIN: CPT | Performed by: INTERNAL MEDICINE

## 2025-04-17 RX ORDER — FLUTICASONE PROPIONATE 50 MCG
1 SPRAY, SUSPENSION (ML) NASAL DAILY
Qty: 16 G | Refills: 5 | Status: SHIPPED | OUTPATIENT
Start: 2025-04-17

## 2025-04-17 RX ORDER — SODIUM BICARBONATE 650 MG/1
650 TABLET ORAL 2 TIMES DAILY
COMMUNITY

## 2025-04-17 SDOH — ECONOMIC STABILITY: FOOD INSECURITY: WITHIN THE PAST 12 MONTHS, YOU WORRIED THAT YOUR FOOD WOULD RUN OUT BEFORE YOU GOT MONEY TO BUY MORE.: NEVER TRUE

## 2025-04-17 SDOH — ECONOMIC STABILITY: FOOD INSECURITY: WITHIN THE PAST 12 MONTHS, THE FOOD YOU BOUGHT JUST DIDN'T LAST AND YOU DIDN'T HAVE MONEY TO GET MORE.: NEVER TRUE

## 2025-04-17 NOTE — PROGRESS NOTES
Subjective:       Chief Complaint  The patient presents for follow up of diabetes, hypertension and high cholesterol.        JANIE Vance Jr. is a 79 y.o. male seen for follow up of diabetes.   Healso has hypertension and hyperlipidemia. Diabetes status uncontrolled, pt is tolerating Lantus  insulin 62 units and not having any significantly low blood sugars is being followed by Pharm.D  has him on Trulicity 1.5 mg.    Patient has been very sensitive to medications in the past.  He is c/o itching on his arms currently for which he uses benadryl gel with improvement. Hypertension well controlled,  on lisinopril 20 mg and coreg 25 mg BID, and Norvasc 10 mg. He was taken off  HCTZ 25 mg daily by Renal since he was started on Kerendia 10 mg by Renal for his CKD..  Patient encouraged to continue to monitor his blood pressure at home and be compliant with taking his medications.  Hyperlipidemia no significant medication side effects noted,  well controlled on  Lipitor 40 mg. . He has h/o CVA and right carotid endarterectomy so AIM is LDL<70.    Diet and Lifestyle: generally follows a low fat low cholesterol diet, does not rigorously follow a diabetic diet, exercises regularly with exercise bike    Home BP Monitoring: is controlled at home,    Diabetic Review of Systems - home glucose monitoring: is performed regularly, 3x/day. He is on CGM with PharmD who also treats his DM.    Other symptoms and concerns: Patient has a history of asbestosis exposure.  Consider chest x-ray follow-up in the near future.      Discussed the patient's BMI with him.  The BMI follow up plan is as follows: I have counseled this patient on diet and exercise regimens.    [place].  It has been over 5 years.  Cologuard done June 2021 was negative.  At age 79 no further testing needed unless patient has symptoms.    Patient has a history of asthma and is doing fairly well on Advair.    Patient has a

## 2025-04-17 NOTE — PROGRESS NOTES
Lucio Vance Jr. presents today for   Chief Complaint   Patient presents with    Diabetes    Hypertension    Cholesterol Problem     3 month follow up            \"Have you been to the ER, urgent care clinic since your last visit?  Hospitalized since your last visit?\"    NO    “Have you seen or consulted any other health care providers outside of [facility] since your last visit?”    Yes, Dr. Donah[facility].

## 2025-04-23 ENCOUNTER — OFFICE VISIT (OUTPATIENT)
Age: 80
End: 2025-04-23
Payer: COMMERCIAL

## 2025-04-23 VITALS
HEIGHT: 66 IN | DIASTOLIC BLOOD PRESSURE: 62 MMHG | HEART RATE: 77 BPM | WEIGHT: 188 LBS | BODY MASS INDEX: 30.22 KG/M2 | OXYGEN SATURATION: 98 % | SYSTOLIC BLOOD PRESSURE: 138 MMHG

## 2025-04-23 DIAGNOSIS — I10 ESSENTIAL (PRIMARY) HYPERTENSION: Primary | ICD-10-CM

## 2025-04-23 DIAGNOSIS — E11.22 TYPE 2 DM WITH CKD STAGE 3 AND HYPERTENSION (HCC): ICD-10-CM

## 2025-04-23 DIAGNOSIS — I12.9 TYPE 2 DM WITH CKD STAGE 3 AND HYPERTENSION (HCC): ICD-10-CM

## 2025-04-23 DIAGNOSIS — N18.30 TYPE 2 DM WITH CKD STAGE 3 AND HYPERTENSION (HCC): ICD-10-CM

## 2025-04-23 DIAGNOSIS — E78.2 MIXED HYPERLIPIDEMIA: ICD-10-CM

## 2025-04-23 PROCEDURE — 3078F DIAST BP <80 MM HG: CPT | Performed by: NURSE PRACTITIONER

## 2025-04-23 PROCEDURE — 3051F HG A1C>EQUAL 7.0%<8.0%: CPT | Performed by: NURSE PRACTITIONER

## 2025-04-23 PROCEDURE — 1036F TOBACCO NON-USER: CPT | Performed by: NURSE PRACTITIONER

## 2025-04-23 PROCEDURE — 1123F ACP DISCUSS/DSCN MKR DOCD: CPT | Performed by: NURSE PRACTITIONER

## 2025-04-23 PROCEDURE — G8427 DOCREV CUR MEDS BY ELIG CLIN: HCPCS | Performed by: NURSE PRACTITIONER

## 2025-04-23 PROCEDURE — G8417 CALC BMI ABV UP PARAM F/U: HCPCS | Performed by: NURSE PRACTITIONER

## 2025-04-23 PROCEDURE — 3075F SYST BP GE 130 - 139MM HG: CPT | Performed by: NURSE PRACTITIONER

## 2025-04-23 PROCEDURE — 99214 OFFICE O/P EST MOD 30 MIN: CPT | Performed by: NURSE PRACTITIONER

## 2025-04-23 RX ORDER — LISINOPRIL 10 MG/1
20 TABLET ORAL DAILY
COMMUNITY

## 2025-04-23 NOTE — PATIENT INSTRUCTIONS
Continue present medication regimen  Follow-up with Dr. Carlos as scheduled and as needed  Low sodium diet, 2000mg per day

## 2025-04-23 NOTE — PROGRESS NOTES
facility-administered medications for this visit.     Allergies:  Allergies   Allergen Reactions    Latex      Other reaction(s): rash/itching    Iron Sucrose      Other reaction(s): Unknown (comments)  Patient states he is not allergic to this.     Furosemide Palpitations       Blood Glucose Monitoring (BGM) or CGM:      ROS:  Today, Pt endorses:  - Symptoms of Hyperglycemia: none  - Symptoms of Hypoglycemia: none    Lifestyle modification(s):  - none    Medication Adherence/Access:  - Endorses adherence to current regimen?: Yes    Vitals/Labs:  No results found for: \"HBA1C\"  Hemoglobin A1C   Date Value Ref Range Status   04/10/2025 7.9 (H) 4.8 - 5.6 % Final     Comment:                 Prediabetes: 5.7 - 6.4           Diabetes: >6.4           Glycemic control for adults with diabetes: <7.0     12/31/2024 7.9 (H) 4.8 - 5.6 % Final     Comment:                 Prediabetes: 5.7 - 6.4           Diabetes: >6.4           Glycemic control for adults with diabetes: <7.0     09/05/2024 8.3 (H) 4.8 - 5.6 % Final     Comment:                 Prediabetes: 5.7 - 6.4           Diabetes: >6.4           Glycemic control for adults with diabetes: <7.0       Hemoglobin A1C, POC   Date Value Ref Range Status   09/01/2023 8.3 % Final       Screenings/Prevention Parameters:  -Diabetic Eye and Foot Exams:      Diabetes Management   Topic Date Due    Diabetic foot exam  Never done    Diabetic retinal exam  03/07/2024     -Microalbumin / Creatinine ratio:     No results found for: \"MICROALBUR\", \"LABCREA\"     Lab Results   Component Value Date    ALBCREAT 214 (H) 04/10/2025      No components found for: \"MALBCREARAT\"  -Immunizations:      Immunization History   Administered Date(s) Administered    COVID-19, MODERNA BLUE border, Primary or Immunocompromised, (age 12y+), IM, 100 mcg/0.5mL 03/11/2021, 04/08/2021, 12/23/2021, 04/23/2022    Influenza Trivalent 10/29/2010, 11/28/2011, 11/29/2011, 10/09/2012, 10/18/2013, 10/16/2014, 10/22/2021

## 2025-04-23 NOTE — PROGRESS NOTES
Lucio Vance Jr. presents today for No chief complaint on file.      Lucio Vance Jr. preferred language for health care discussion is english/other.    Is someone accompanying this pt? no    Is the patient using any DME equipment during OV? no    Depression Screening:  Depression: Not at risk (4/8/2025)    PHQ-2     PHQ-2 Score: 0        Learning Assessment:  No question data found.       Pt currently taking Anticoagulant therapy? no    Pt currently taking Antiplatelet therapy ? Aspirin 81 mg daily      Coordination of Care:  1. Have you been to the ER, urgent care clinic since your last visit? Hospitalized since your last visit? no    2. Have you seen or consulted any other health care providers outside of the [facility] since your last visit? Include any pap smears or colon screening. no    
2023 showed a small sliding HH, Schatzki's ring, HP neg gastritis, small HP gastric polyp, non-bleeding internal & external hemorrhoids, & two small TA polyps in the transverse colon.    He wore an event monitor in Sept. 2023, and it showed sinus rhythm with sinus arrhythmia, occasional PACs and PVCs (both with <1% burden).  He had an echo done in  October 2023, and it showed normal EF 66% with PA pressure 29 mmHg.  His nuclear scan done in February 2024 showed EF 62% with normal perfusion.      Lucio Vance Jr. is a 79 y.o. male presents today for :  Chief Complaint   Patient presents with   • Follow-up     2 weeks follow up for bp       PMH:  Past Medical History:   Diagnosis Date   • Asthma    • Bilateral carotid artery stenosis    • Chronic kidney disease     stage 3b   • Diabetes mellitus (HCC)    • HTN (hypertension)    • ICAO (internal carotid artery occlusion), left    • TIA (transient ischemic attack) 10/14/2021   • Transient ischemic attack (TIA) 04/2015   • Unspecified hyperplasia of prostate with urinary obstruction and other lower urinary tract symptoms (LUTS)        PSH:  Past Surgical History:   Procedure Laterality Date   • COLONOSCOPY N/A 1/5/2023    COLONOSCOPY WITH POLYPECTOMIES performed by Jignesh Dowling MD at [facility] ENDOSCOPY   • COLONOSCOPY N/A 1/24/2024    COLONOSCOPY performed by Jignesh Dowling MD at [facility] ENDOSCOPY   • COLONOSCOPY N/A 1/25/2024    COLONOSCOPY with polypectomies performed by Jignesh Dowling MD at [facility] ENDOSCOPY   • LASER VAPORIZATION SURGERY PROSTATE, COMPLETE     • THROMBOENDARTERECTOMY Right 10/20/2021    THROMBOENDARTERECTOMY, CAROTID;  Surgeon: Mikie Quevedo MD   • UROLOGICAL SURGERY      Prostate Biopsy       MEDS:  Current Outpatient Medications   Medication Sig Dispense Refill   • lisinopril (PRINIVIL;ZESTRIL) 10 MG tablet Take 2 tablets by mouth daily     • sodium bicarbonate 650 MG tablet Take 1 tablet by mouth 2 times daily     • fluticasone (FLONASE) 50 MCG/ACT nasal

## 2025-04-25 ENCOUNTER — PHARMACY VISIT (OUTPATIENT)
Facility: CLINIC | Age: 80
End: 2025-04-25

## 2025-04-25 DIAGNOSIS — Z79.4 TYPE 2 DIABETES MELLITUS WITH STAGE 3B CHRONIC KIDNEY DISEASE, WITH LONG-TERM CURRENT USE OF INSULIN (HCC): Primary | ICD-10-CM

## 2025-04-25 DIAGNOSIS — E11.22 TYPE 2 DIABETES MELLITUS WITH STAGE 3B CHRONIC KIDNEY DISEASE, WITH LONG-TERM CURRENT USE OF INSULIN (HCC): Primary | ICD-10-CM

## 2025-04-25 DIAGNOSIS — N18.32 TYPE 2 DIABETES MELLITUS WITH STAGE 3B CHRONIC KIDNEY DISEASE, WITH LONG-TERM CURRENT USE OF INSULIN (HCC): Primary | ICD-10-CM

## 2025-04-25 RX ORDER — HYDROCHLOROTHIAZIDE 12.5 MG/1
CAPSULE ORAL
Qty: 6 EACH | Refills: 3 | Status: SHIPPED | OUTPATIENT
Start: 2025-04-25

## 2025-04-25 RX ORDER — KETOROLAC TROMETHAMINE 30 MG/ML
INJECTION, SOLUTION INTRAMUSCULAR; INTRAVENOUS
Qty: 1 EACH | Refills: 0 | Status: SHIPPED | OUTPATIENT
Start: 2025-04-25

## 2025-04-29 ENCOUNTER — TELEPHONE (OUTPATIENT)
Facility: CLINIC | Age: 80
End: 2025-04-29

## 2025-04-29 NOTE — TELEPHONE ENCOUNTER
PCP: Bashir Clemens MD    Refill Request     LAST OFFICE VISIT: 04/17/25      Medication:   lisinopril (PRINIVIL;ZESTRIL) 10 MG tablet     Dispense:   Take 2 tablets by mouth daily     Pharmacy [facility] - [address] [phone] - F [phone] [[id number]]       Future Appointments   Date Time Provider Department Center   6/30/2025  9:30 AM Felipe Roman [facility] DEP   8/21/2025 10:20 AM Bashir Clemens MD [facility] DEP   10/21/2025  9:40 AM Juancho Carlos MD [facility] BS AMB

## 2025-04-30 RX ORDER — LISINOPRIL 10 MG/1
20 TABLET ORAL DAILY
Qty: 90 TABLET | Refills: 3 | Status: SHIPPED | OUTPATIENT
Start: 2025-04-30

## 2025-05-06 ENCOUNTER — TELEPHONE (OUTPATIENT)
Facility: CLINIC | Age: 80
End: 2025-05-06

## 2025-05-06 RX ORDER — LANSOPRAZOLE 30 MG/1
30 CAPSULE, DELAYED RELEASE ORAL
Qty: 90 CAPSULE | Refills: 3 | Status: SHIPPED | OUTPATIENT
Start: 2025-05-06

## 2025-05-06 NOTE — TELEPHONE ENCOUNTER
Refill request via phone     Medication: lansoprazole (PREVACID) 30 MG delayed release capsule   Quantity: 90  Pharmacy: [facility]   Last Fill: 4/29/2024     PCP: Bashir Clemens MD    LAST OFFICE VISIT: 4/17/2025       Future Appointments   Date Time Provider Department Center   6/30/2025  9:30 AM Felipe Roman [facility] DEP   8/21/2025 10:20 AM Bashir Clemens MD [facility]   10/21/2025  9:40 AM Juancho Carlos MD [facility] BS [facility]       Pt would like script sent to local pharmacy

## 2025-05-07 RX ORDER — LANSOPRAZOLE 30 MG/1
30 CAPSULE, DELAYED RELEASE ORAL
Qty: 90 CAPSULE | Refills: 3 | Status: CANCELLED | OUTPATIENT
Start: 2025-05-07

## 2025-05-21 DIAGNOSIS — N18.32 TYPE 2 DIABETES MELLITUS WITH STAGE 3B CHRONIC KIDNEY DISEASE, WITH LONG-TERM CURRENT USE OF INSULIN (HCC): ICD-10-CM

## 2025-05-21 DIAGNOSIS — E11.22 TYPE 2 DIABETES MELLITUS WITH STAGE 3B CHRONIC KIDNEY DISEASE, WITH LONG-TERM CURRENT USE OF INSULIN (HCC): ICD-10-CM

## 2025-05-21 DIAGNOSIS — Z79.4 TYPE 2 DIABETES MELLITUS WITH STAGE 3B CHRONIC KIDNEY DISEASE, WITH LONG-TERM CURRENT USE OF INSULIN (HCC): ICD-10-CM

## 2025-05-21 RX ORDER — INSULIN GLARGINE 100 [IU]/ML
INJECTION, SOLUTION SUBCUTANEOUS
Qty: 45 ML | Refills: 3 | Status: SHIPPED | OUTPATIENT
Start: 2025-05-21

## 2025-06-25 NOTE — PROGRESS NOTES
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  Pt was seen today for diabetes management.  Patient's last A1c was:   Hemoglobin A1C   Date Value Ref Range Status   04/10/2025 7.9 (H) 4.8 - 5.6 % Final     Comment:                 Prediabetes: 5.7 - 6.4           Diabetes: >6.4           Glycemic control for adults with diabetes: <7.0       Hemoglobin A1C, POC   Date Value Ref Range Status   09/01/2023 8.3 % Final     Hemoglobin A1C, External   Date Value Ref Range Status   07/12/2022 8.2 % Final        Interim update: Pt was last seen by me on 4/25/2025.  Per my prior note: Pt's A1c is not at goal of < 7%.  Pt's A1c improved from prior check, but still higher than what his CGM data suggests.  His A1c could be elevated d/t his chronic anemia.  His AGP report from the past 14 days shows time in target range 78%, average glucose 154 mg/dL, and GMI 7%.  He would likely benefit from a dose increase of Trulicity to 3mg weekly d/t his post-prandial elevations, but pt refuses at this time d/t his home supply of 1.5mg.  Will maintain his current tx and will reassess with CGM data in 2 months.    Today:   Pt denies changes to his diet or physical activity.  He received another shipment of Trulicity despite telling them not to send it.  CGM data gone over.  He is amenable to increasing his dose of Trulicity at this time.  Advised he can use up the current 1.5mg pens by injecting two at once and he states that he might do that.    Current anti-hyperglycemic regimen includes:    Key Antihyperglycemic Medications              insulin glargine (LANTUS SOLOSTAR) 100 UNIT/ML injection pen ADMINISTER 62 UNITS UNDER THE SKIN EVERY MORNING    dulaglutide (TRULICITY) 1.5 MG/0.5ML SC injection Inject 0.5 mLs into the skin every 7 days          Complete current medication regimen includes:  Current Outpatient Medications   Medication Sig    insulin

## 2025-06-30 ENCOUNTER — PHARMACY VISIT (OUTPATIENT)
Facility: CLINIC | Age: 80
End: 2025-06-30

## 2025-06-30 DIAGNOSIS — N18.32 TYPE 2 DIABETES MELLITUS WITH STAGE 3B CHRONIC KIDNEY DISEASE, WITH LONG-TERM CURRENT USE OF INSULIN (HCC): Primary | ICD-10-CM

## 2025-06-30 DIAGNOSIS — Z79.4 TYPE 2 DIABETES MELLITUS WITH STAGE 3B CHRONIC KIDNEY DISEASE, WITH LONG-TERM CURRENT USE OF INSULIN (HCC): Primary | ICD-10-CM

## 2025-06-30 DIAGNOSIS — E11.22 TYPE 2 DIABETES MELLITUS WITH STAGE 3B CHRONIC KIDNEY DISEASE, WITH LONG-TERM CURRENT USE OF INSULIN (HCC): Primary | ICD-10-CM

## 2025-07-03 RX ORDER — LANSOPRAZOLE 30 MG/1
30 CAPSULE, DELAYED RELEASE ORAL DAILY
Qty: 90 CAPSULE | Refills: 3 | Status: SHIPPED | OUTPATIENT
Start: 2025-07-03

## 2025-07-29 ENCOUNTER — TELEPHONE (OUTPATIENT)
Facility: CLINIC | Age: 80
End: 2025-07-29

## 2025-07-29 RX ORDER — BENZONATATE 100 MG/1
100 CAPSULE ORAL 2 TIMES DAILY PRN
Qty: 60 CAPSULE | Refills: 0 | Status: SHIPPED | OUTPATIENT
Start: 2025-07-29 | End: 2025-07-30 | Stop reason: SDUPTHER

## 2025-07-29 NOTE — TELEPHONE ENCOUNTER
Pt is asking for a refill on   PCP: Bashir Clemens MD    Refill Request     LAST OFFICE VISIT:01/10/2025      Medication:  benzonatate (TESSALON) 100 MG capsule [[id number]]         [facility]       Future Appointments   Date Time Provider Department Center   8/6/2025 10:30 AM Felipe Roman [facility] DEP   8/18/2025  9:40 AM Bashir Clemens MD [facility] DEP   10/21/2025  9:40 AM Juancho Carlos MD [facility] BS AMB

## 2025-07-30 RX ORDER — BENZONATATE 100 MG/1
100 CAPSULE ORAL 2 TIMES DAILY PRN
Qty: 60 CAPSULE | Refills: 0 | Status: SHIPPED | OUTPATIENT
Start: 2025-07-30

## 2025-08-04 ENCOUNTER — TELEPHONE (OUTPATIENT)
Facility: CLINIC | Age: 80
End: 2025-08-04

## 2025-08-06 ENCOUNTER — PHARMACY VISIT (OUTPATIENT)
Facility: CLINIC | Age: 80
End: 2025-08-06

## 2025-08-06 DIAGNOSIS — E11.22 TYPE 2 DIABETES MELLITUS WITH STAGE 3B CHRONIC KIDNEY DISEASE, WITH LONG-TERM CURRENT USE OF INSULIN (HCC): ICD-10-CM

## 2025-08-06 DIAGNOSIS — Z79.4 TYPE 2 DIABETES MELLITUS WITH STAGE 3B CHRONIC KIDNEY DISEASE, WITH LONG-TERM CURRENT USE OF INSULIN (HCC): ICD-10-CM

## 2025-08-06 DIAGNOSIS — N18.32 TYPE 2 DIABETES MELLITUS WITH STAGE 3B CHRONIC KIDNEY DISEASE, WITH LONG-TERM CURRENT USE OF INSULIN (HCC): ICD-10-CM

## 2025-08-06 RX ORDER — INSULIN GLARGINE 100 [IU]/ML
INJECTION, SOLUTION SUBCUTANEOUS
Qty: 45 ML | Refills: 3 | Status: SHIPPED | OUTPATIENT
Start: 2025-08-06

## 2025-08-07 LAB
25(OH)D3+25(OH)D2 SERPL-MCNC: 36.4 NG/ML (ref 30–100)
ALBUMIN SERPL-MCNC: 4.2 G/DL (ref 3.8–4.8)
ALP SERPL-CCNC: 96 IU/L (ref 44–121)
ALT SERPL-CCNC: 18 IU/L (ref 0–44)
AST SERPL-CCNC: 18 IU/L (ref 0–40)
BASOPHILS # BLD AUTO: 0 X10E3/UL (ref 0–0.2)
BASOPHILS NFR BLD AUTO: 1 %
BILIRUB SERPL-MCNC: 0.2 MG/DL (ref 0–1.2)
BUN SERPL-MCNC: 30 MG/DL (ref 8–27)
BUN/CREAT SERPL: 19 (ref 10–24)
CALCIUM SERPL-MCNC: 9.8 MG/DL (ref 8.6–10.2)
CHLORIDE SERPL-SCNC: 104 MMOL/L (ref 96–106)
CHOLEST SERPL-MCNC: 139 MG/DL (ref 100–199)
CO2 SERPL-SCNC: 21 MMOL/L (ref 20–29)
CREAT SERPL-MCNC: 1.59 MG/DL (ref 0.76–1.27)
EGFRCR SERPLBLD CKD-EPI 2021: 44 ML/MIN/1.73
EOSINOPHIL # BLD AUTO: 0 X10E3/UL (ref 0–0.4)
EOSINOPHIL NFR BLD AUTO: 1 %
ERYTHROCYTE [DISTWIDTH] IN BLOOD BY AUTOMATED COUNT: 16 % (ref 11.6–15.4)
GLOBULIN SER CALC-MCNC: 2.5 G/DL (ref 1.5–4.5)
GLUCOSE SERPL-MCNC: 170 MG/DL (ref 70–99)
HBA1C MFR BLD: 7.3 % (ref 4.8–5.6)
HCT VFR BLD AUTO: 30 % (ref 37.5–51)
HDLC SERPL-MCNC: 51 MG/DL
HGB BLD-MCNC: 9.7 G/DL (ref 13–17.7)
IMM GRANULOCYTES # BLD AUTO: 0 X10E3/UL (ref 0–0.1)
IMM GRANULOCYTES NFR BLD AUTO: 0 %
LDLC SERPL CALC-MCNC: 67 MG/DL (ref 0–99)
LYMPHOCYTES # BLD AUTO: 1.1 X10E3/UL (ref 0.7–3.1)
LYMPHOCYTES NFR BLD AUTO: 22 %
MCH RBC QN AUTO: 29.9 PG (ref 26.6–33)
MCHC RBC AUTO-ENTMCNC: 32.3 G/DL (ref 31.5–35.7)
MCV RBC AUTO: 93 FL (ref 79–97)
MONOCYTES # BLD AUTO: 0.5 X10E3/UL (ref 0.1–0.9)
MONOCYTES NFR BLD AUTO: 10 %
NEUTROPHILS # BLD AUTO: 3.2 X10E3/UL (ref 1.4–7)
NEUTROPHILS NFR BLD AUTO: 66 %
PLATELET # BLD AUTO: 263 X10E3/UL (ref 150–450)
POTASSIUM SERPL-SCNC: 5.1 MMOL/L (ref 3.5–5.2)
PROT SERPL-MCNC: 6.7 G/DL (ref 6–8.5)
RBC # BLD AUTO: 3.24 X10E6/UL (ref 4.14–5.8)
SODIUM SERPL-SCNC: 137 MMOL/L (ref 134–144)
SPECIMEN STATUS REPORT: NORMAL
TRIGL SERPL-MCNC: 119 MG/DL (ref 0–149)
VLDLC SERPL CALC-MCNC: 21 MG/DL (ref 5–40)
WBC # BLD AUTO: 4.8 X10E3/UL (ref 3.4–10.8)

## 2025-08-18 ENCOUNTER — TELEPHONE (OUTPATIENT)
Facility: CLINIC | Age: 80
End: 2025-08-18

## 2025-08-18 ENCOUNTER — OFFICE VISIT (OUTPATIENT)
Facility: CLINIC | Age: 80
End: 2025-08-18
Payer: COMMERCIAL

## 2025-08-18 VITALS
BODY MASS INDEX: 31.34 KG/M2 | TEMPERATURE: 98.1 F | HEIGHT: 66 IN | OXYGEN SATURATION: 97 % | HEART RATE: 93 BPM | RESPIRATION RATE: 20 BRPM | DIASTOLIC BLOOD PRESSURE: 74 MMHG | SYSTOLIC BLOOD PRESSURE: 132 MMHG | WEIGHT: 195 LBS

## 2025-08-18 DIAGNOSIS — N18.32 TYPE 2 DIABETES MELLITUS WITH STAGE 3B CHRONIC KIDNEY DISEASE, WITH LONG-TERM CURRENT USE OF INSULIN (HCC): Primary | ICD-10-CM

## 2025-08-18 DIAGNOSIS — E78.2 MIXED HYPERLIPIDEMIA: ICD-10-CM

## 2025-08-18 DIAGNOSIS — I10 ESSENTIAL (PRIMARY) HYPERTENSION: ICD-10-CM

## 2025-08-18 DIAGNOSIS — E55.9 VITAMIN D DEFICIENCY: ICD-10-CM

## 2025-08-18 DIAGNOSIS — Z79.4 TYPE 2 DIABETES MELLITUS WITH STAGE 3B CHRONIC KIDNEY DISEASE, WITH LONG-TERM CURRENT USE OF INSULIN (HCC): Primary | ICD-10-CM

## 2025-08-18 DIAGNOSIS — D50.0 IRON DEFICIENCY ANEMIA SECONDARY TO BLOOD LOSS (CHRONIC): ICD-10-CM

## 2025-08-18 DIAGNOSIS — E11.22 TYPE 2 DIABETES MELLITUS WITH STAGE 3B CHRONIC KIDNEY DISEASE, WITH LONG-TERM CURRENT USE OF INSULIN (HCC): Primary | ICD-10-CM

## 2025-08-18 PROCEDURE — 3051F HG A1C>EQUAL 7.0%<8.0%: CPT | Performed by: INTERNAL MEDICINE

## 2025-08-18 PROCEDURE — 99214 OFFICE O/P EST MOD 30 MIN: CPT | Performed by: INTERNAL MEDICINE

## 2025-08-18 PROCEDURE — 1123F ACP DISCUSS/DSCN MKR DOCD: CPT | Performed by: INTERNAL MEDICINE

## 2025-08-18 PROCEDURE — 1036F TOBACCO NON-USER: CPT | Performed by: INTERNAL MEDICINE

## 2025-08-18 PROCEDURE — 3078F DIAST BP <80 MM HG: CPT | Performed by: INTERNAL MEDICINE

## 2025-08-18 PROCEDURE — 3075F SYST BP GE 130 - 139MM HG: CPT | Performed by: INTERNAL MEDICINE

## 2025-08-18 PROCEDURE — G8417 CALC BMI ABV UP PARAM F/U: HCPCS | Performed by: INTERNAL MEDICINE

## 2025-08-18 PROCEDURE — G8427 DOCREV CUR MEDS BY ELIG CLIN: HCPCS | Performed by: INTERNAL MEDICINE

## (undated) DEVICE — LINER SUCT CANSTR 3000CC PLAS SFT PRE ASSEMB W/OUT TBNG W/

## (undated) DEVICE — SNARE ENDOSCP POLYP 2.4 MM 240 CM 10 MM 2.8 MM CAPTIVATOR

## (undated) DEVICE — MEDI-VAC NON-CONDUCTIVE SUCTION TUBING: Brand: CARDINAL HEALTH

## (undated) DEVICE — TRAP SPEC POLYP REM STRNR CLN DSGN MAGNIFYING WIND DISP

## (undated) DEVICE — SOLUTION IRRIG 1000ML H2O STRL BLT

## (undated) DEVICE — CATHETER SUCT TR FL TIP 14FR W/ O CTRL

## (undated) DEVICE — YANKAUER,SMOOTH HANDLE,HIGH CAPACITY: Brand: MEDLINE INDUSTRIES, INC.

## (undated) DEVICE — GAUZE,SPONGE,4"X4",16PLY,STRL,LF,10/TRAY: Brand: MEDLINE

## (undated) DEVICE — FLUFF AND POLYMER UNDERPAD,EXTRA HEAVY: Brand: WINGS

## (undated) DEVICE — BITE BLOCK ENDOSCP UNIV AD 6 TO 9.4 MM

## (undated) DEVICE — CANNULA NSL AD TBNG L14FT STD PVC O2 CRV CONN NONFLARED NSL

## (undated) DEVICE — CANNULA ORIG TL CLR W FOAM CUSHIONS AND 14FT SUPL TB 3 CHN

## (undated) DEVICE — UNDERPAD INCONT W23XL36IN STD BLU POLYPR BK FLUF SFT

## (undated) DEVICE — SNARE VASC L240CM LOOP W10MM SHTH DIA2.4MM RND STIFF CLD

## (undated) DEVICE — GOWN PLASTIC FILM THMBHKS UNIV BLUE: Brand: CARDINAL HEALTH

## (undated) DEVICE — BASIN EMSIS 16OZ GRAPHITE PLAS KID SHP MOLD GRAD FOR ORAL

## (undated) DEVICE — SYRINGE MED 25GA 3ML L5/8IN SUBQ PLAS W/ DETACH NDL SFTY

## (undated) DEVICE — SYRINGE MED 50ML LUERSLIP TIP

## (undated) DEVICE — SYRINGE 20ML LL S/C 50

## (undated) DEVICE — ENDOSCOPY PUMP TUBING/ CAP SET: Brand: ERBE

## (undated) DEVICE — SYRINGE MED 10ML LUERLOCK TIP W/O SFTY DISP

## (undated) DEVICE — STERILE POLYISOPRENE POWDER-FREE SURGICAL GLOVES: Brand: PROTEXIS